# Patient Record
Sex: MALE | Race: WHITE | NOT HISPANIC OR LATINO | Employment: OTHER | URBAN - METROPOLITAN AREA
[De-identification: names, ages, dates, MRNs, and addresses within clinical notes are randomized per-mention and may not be internally consistent; named-entity substitution may affect disease eponyms.]

---

## 2017-03-07 ENCOUNTER — TELEPHONE (OUTPATIENT)
Dept: FAMILY MEDICINE | Facility: CLINIC | Age: 57
End: 2017-03-07

## 2017-03-07 NOTE — TELEPHONE ENCOUNTER
Panel Management Review      Patient has the following on his problem list:     Diabetes    ASA: Not Required     Last A1C  Lab Results   Component Value Date    A1C 8.1 06/09/2016    A1C 8.1 12/10/2015    A1C 8.2 05/28/2015    A1C 8.0 01/24/2015    A1C 8.1 02/19/2014     A1C tested: FAILED    Last LDL:    Lab Results   Component Value Date    CHOL 149 12/09/2016     Lab Results   Component Value Date    HDL 97 12/09/2016     Lab Results   Component Value Date    LDL 44 12/09/2016     Lab Results   Component Value Date    TRIG 39 12/09/2016     Lab Results   Component Value Date    CHOLHDLRATIO 1.6 01/24/2015     Lab Results   Component Value Date    NHDL 52 12/09/2016       Is the patient on a Statin? YES             Is the patient on Aspirin? YES    Medications     HMG CoA Reductase Inhibitors    SIMVASTATIN PO    Salicylates    aspirin 81 MG tablet          Last three blood pressure readings:  BP Readings from Last 3 Encounters:   12/22/16 118/80   10/17/16 130/72   04/08/15 136/74       Date of last diabetes office visit: unknown     Tobacco History:     History   Smoking Status     Former Smoker   Smokeless Tobacco     Former User     Comment: quite 20 years ago           Composite cancer screening  Chart review shows that this patient is due/due soon for the following None  Summary:    Patient is due/failing the following:   A1C    Action needed:   Patient needs office visit for Diabetes recheck.    Type of outreach:    Phone, left message for patient to call back.     Questions for provider review:    None                                                                                                                                    Li Whitley CMA      Chart routed to Care Team .

## 2017-04-19 NOTE — TELEPHONE ENCOUNTER
Called patient and left a message to give the clinic a call back.  When patient calls back please relay the below message.  Thank you.       Li Whitley CMA

## 2017-06-08 DIAGNOSIS — E10.42 TYPE 1 DIABETES MELLITUS WITH DIABETIC POLYNEUROPATHY (H): Primary | ICD-10-CM

## 2017-06-08 LAB — HBA1C MFR BLD: 7.8 % (ref 4.3–6)

## 2017-06-08 PROCEDURE — 36415 COLL VENOUS BLD VENIPUNCTURE: CPT | Performed by: INTERNAL MEDICINE

## 2017-06-08 PROCEDURE — 83036 HEMOGLOBIN GLYCOSYLATED A1C: CPT | Mod: QW | Performed by: INTERNAL MEDICINE

## 2017-09-21 ENCOUNTER — OFFICE VISIT (OUTPATIENT)
Dept: FAMILY MEDICINE | Facility: OTHER | Age: 57
End: 2017-09-21
Payer: COMMERCIAL

## 2017-09-21 VITALS
BODY MASS INDEX: 27.1 KG/M2 | SYSTOLIC BLOOD PRESSURE: 114 MMHG | OXYGEN SATURATION: 99 % | DIASTOLIC BLOOD PRESSURE: 80 MMHG | RESPIRATION RATE: 18 BRPM | HEIGHT: 71 IN | WEIGHT: 193.6 LBS | HEART RATE: 67 BPM | TEMPERATURE: 97.7 F

## 2017-09-21 DIAGNOSIS — M62.838 SPASM OF MUSCLE: ICD-10-CM

## 2017-09-21 DIAGNOSIS — E10.42 TYPE 1 DIABETES MELLITUS WITH DIABETIC POLYNEUROPATHY (H): ICD-10-CM

## 2017-09-21 DIAGNOSIS — M25.461 EFFUSION OF RIGHT KNEE: ICD-10-CM

## 2017-09-21 DIAGNOSIS — R19.7 DIARRHEA OF PRESUMED INFECTIOUS ORIGIN: Primary | ICD-10-CM

## 2017-09-21 PROCEDURE — 99214 OFFICE O/P EST MOD 30 MIN: CPT | Performed by: FAMILY MEDICINE

## 2017-09-21 PROCEDURE — 87506 IADNA-DNA/RNA PROBE TQ 6-11: CPT | Performed by: FAMILY MEDICINE

## 2017-09-21 ASSESSMENT — PAIN SCALES - GENERAL: PAINLEVEL: NO PAIN (1)

## 2017-09-21 NOTE — PROGRESS NOTES
SUBJECTIVE:   Enmanuel Mckeon is a 57 year old male who presents to clinic today for the following health issues:        Diarrhea  Onset: 2 weeks    Description:   Consistency of stool: watery  Blood in stool: no   Number of loose stools in past 24 hours: 6    Progression of Symptoms:  same    Accompanying Signs & Symptoms:  Fever: no   Nausea or vomiting; no   Abdominal pain: no   Episodes of constipation: no   Weight loss: YES    History:   Ill contacts: no   Recent use of antibiotics: YES   Recent travels: no          Recent medication-new or changes(Rx or OTC): no     Precipitating factors:   Nothing    Alleviating factors:   nothing    Therapies Tried and outcome:  PeptoBismol; Outcome: not helping    Knee Pain    Onset: week    Description:   Location: right knee  Character: Dull ache    Intensity: mild    Progression of Symptoms: same    Accompanying Signs & Symptoms:  Other symptoms: numbness    History:   Previous similar pain: no       Precipitating factors:   Trauma or overuse: no     Alleviating factors:  Improved by: nothing    Therapies Tried and outcome: Nothing tried.       Neck and Shoulder Pain  Onset: Week    Description:   Location: Right side  Radiation: into the right neck and into the right shoulder    Intensity: mild    Progression of Symptoms:  same    Accompanying Signs & Symptoms:  Burning, prickly sensation (paresthesias) in arm(s): no   Numbness in arm(s): no   Weakness in arm(s):  no   Fever: no   Headache: no   Nausea and/or vomiting: no     History:   Trauma: no   Previous neck pain: YES  Previous surgery or injections: Yes, shoulder injection   Previous Imaging (MRI,X ray): no     Precipitating factors:   Does movement increase the pain:  no     Alleviating factors:  nothing    Therapies Tried and outcome:  IBU little relief but not much with neck spasms.           Diabetes Follow-up    Patient is checking blood sugars: four times daily.    Blood sugar testing frequency  justification: Frequent hypoglycemia  Results are as follows:  He is followed by Dr. De Paz, Endocrinology.        Diabetic concerns: blood sugar frequently over 200     Symptoms of hypoglycemia (low blood sugar): shaky     Paresthesias (numbness or burning in feet) or sores: No   Date of last diabetic eye exam: <1 year        Problem list and histories reviewed & adjusted, as indicated.    He was seen by endocrinology for poison ivy on his arms 2 months ago. Dr. De Paz thought he may have a secondary cellulitis and put him on consecutive courses of what sounds like Keflex 4 times daily. Both courses were for nearly 3 weeks total. Onset of loose stool following the second course of antibiotic. He has no known contaminated water exposure, He has well water and thinks his wife may also be having some loose stools. No recent travel outside the [place].    Additional history: Patient Active Problem List   Diagnosis     TYPE 1 DIABETES, HBA1C GOAL < 7%     CARDIOVASCULAR SCREENING; LDL GOAL LESS THAN 100     Hypertension goal BP (blood pressure) < 140/90     Past Surgical History:   Procedure Laterality Date     HC COLONOSCOPY W/WO BRUSH/WASH  01/27/09       Social History   Substance Use Topics     Smoking status: Former Smoker     Smokeless tobacco: Former User      Comment: quite 20 years ago     Alcohol use 0.0 oz/week     0 Standard drinks or equivalent per week      Comment: occasional     Family History   Problem Relation Age of Onset     Alcohol/Drug Sister      DIABETES Father      HEART DISEASE Father      4 way bypass     Neurologic Disorder Mother      anurism         Current Outpatient Prescriptions   Medication Sig Dispense Refill     SIMVASTATIN PO        Insulin Aspart (NOVOLOG SC)        aspirin 81 MG tablet Take 2 tablets (162 mg) by mouth daily 100 tablet 3     glucose blood VI test strips (ACCU-CHEK COMFORT CURVE) strip 5 times daily 100 each 0     multivitamin, therapeutic with minerals (MULTI-VITAMIN)  "TABS Take 1 tablet by mouth daily 100 tablet 3     LISINOPRIL 5 MG OR TABS 1 1/2 TABLET DAILY 30 0     INSULIN SYRINGE-NEEDLE U-100 MISC 0.25 ML 29 X 1/2\"  insulin pump       fluticasone (FLONASE) 50 MCG/ACT nasal spray Spray 1-2 sprays into both nostrils daily (Patient not taking: Reported on 9/21/2017) 16 g 11     Allergies   Allergen Reactions     No Known Drug Allergies      Recent Labs   Lab Test  06/08/17   1606  12/09/16   0859  06/09/16   0905  12/10/15   0751   06/05/12   0821   07/01/11   0752   A1C  7.8*   --   8.1*  8.1*   < >  7.6*   < >  8.0*   LDL   --   44  77  95   < >  106   --   101   HDL   --   97  102  96   < >  86   --   85   TRIG   --   39  52  47   < >  65   --   102   ALT   --    --    --    --    --   31   --   25   CR   --   0.76   --   0.81   < >  0.75   --   0.87   GFRESTIMATED   --   >90  Non  GFR Calc     --   >90  Non  GFR Calc     < >  >90   --   >90   GFRESTBLACK   --   >90   GFR Calc     --   >90   GFR Calc     < >  >90   --   >90   POTASSIUM   --   4.4   --   4.4   < >  4.9   --   4.2   TSH   --    --    --    --    --   1.84   --   1.66    < > = values in this interval not displayed.      BP Readings from Last 3 Encounters:   09/21/17 114/80   12/22/16 118/80   10/17/16 130/72    Wt Readings from Last 3 Encounters:   09/21/17 193 lb 9.6 oz (87.8 kg)   12/22/16 196 lb (88.9 kg)   10/17/16 195 lb 12.8 oz (88.8 kg)                  Labs reviewed in EPIC          Reviewed and updated as needed this visit by clinical staffTobacco  Allergies  Meds  Problems  Med Hx  Surg Hx  Fam Hx  Soc Hx        Reviewed and updated as needed this visit by Provider  Allergies  Meds  Problems         ROS:  C: NEGATIVE for fever, chills, change in weight  E/M: NEGATIVE for ear, mouth and throat problems  R: NEGATIVE for significant cough or SOB  CV: NEGATIVE for chest pain, palpitations or peripheral edema  GI: POSITIVE for " "diarrhea 6-7 watery non bloody stools/day and NEGATIVE for abdominal pain/cramping, constipation, gas or bloating, hematochezia, hemorrhoids, Hx colon polyps, Hx IBD, Hx IBS, jaundice, melena, nausea, poor appetite and vomiting  MUSCULOSKELETAL: POSITIVE  for joint swelling right knee, without known injury or pain and muscle spasm neck muscles and charley horse in legs over the last 2-3 weeks.  ENDOCRINE: POSITIVE  for HX diabetes followed by Endocrinology, Dr. Gonzales.  ROS otherwise negative    OBJECTIVE:     /80 (BP Location: Right arm, Patient Position: Chair, Cuff Size: Adult Large)  Pulse 67  Temp 97.7  F (36.5  C) (Temporal)  Resp 18  Ht 5' 11\" (1.803 m)  Wt 193 lb 9.6 oz (87.8 kg)  SpO2 99%  BMI 27 kg/m2  Body mass index is 27 kg/(m^2).  GENERAL: healthy, alert and no distress  NECK: no adenopathy, no asymmetry, masses, or scars and thyroid normal to palpation  RESP: lungs clear to auscultation - no rales, rhonchi or wheezes  CV: regular rate and rhythm, normal S1 S2, no S3 or S4, no murmur, click or rub, no peripheral edema and peripheral pulses strong  ABDOMEN: soft, nontender, no hepatosplenomegaly, no masses and bowel sounds normal  MS: RLE exam shows normal strength and muscle mass, no deformities, no erythema, induration, or nodules, ROM of all joints is normal, no evidence of joint instability and tr-1+ effusion of right knee.    Diagnostic Test Results:  none     ASSESSMENT/PLAN:     1. Diarrhea of presumed infectious origin  Acute, suspect due to prolonged course of antibiotics for cellulitis. Possible GI side effect vs C. Diff. Vs  Pathogenic bacteria from well water. Abdomen is benign. Will obtain stool for c.diff, enteric bacteria and virus as well as O&P. Encourage fluids and consider starting OTC Probiotic and/or Imodium for symptom relief until stool cultures return. Enteric precautions urged.  - Enteric Bacteria and Virus Panel by [facility] Stool; Future  - Clostridium difficile toxin " B PCR; Future  - Ova and Parasite Exam Routine; Future    2. Effusion of right knee  Acute, most likely over use/ traumatic from jumping in and out of his truck. Effusion only without sign of infection or synovitis. Will wear ACE or knee sleeve, start scheduled OTC NSAID, renal function is normal, ice and follow up if not improving. May need diagnostic arthrocentesis and steroid injection.    3. Spasm of muscle  Acute, suspect due to mild dehydration associated with prolonged diarrhea. Encourage plenty of fluids (up to 8 glasses of water daily) is suggested to relieve these symptoms.     4. Type 1 diabetes mellitus with diabetic polyneuropathy (H)  Chronic currently followed by Dr. De Paz. He will be making routine follow up appointment. A1C is adequate.      FUTURE APPOINTMENTS:       - Follow-up visit in 2-3 weeks if knee not improving.       - Make appointment with Dr. De Paz specialist    Pino Calles MD  [facility]

## 2017-09-21 NOTE — MR AVS SNAPSHOT
"              After Visit Summary   9/21/2017    Enmanuel Mckeon    MRN: [id number]           Patient Information     Date Of Birth          1960        Visit Information        Provider Department      9/21/2017 11:00 AM Pino Calles MD [facility]        Today's Diagnoses     Diarrhea of presumed infectious origin    -  1    Effusion of right knee        Spasm of muscle        Type 1 diabetes mellitus with diabetic polyneuropathy (H)           Follow-ups after your visit        Future tests that were ordered for you today     Open Future Orders        Priority Expected Expires Ordered    Enteric Bacteria and Virus Panel by [facility] Stool Routine  9/21/2018 9/21/2017    Clostridium difficile toxin B PCR Routine  9/29/2017 9/21/2017    Ova and Parasite Exam Routine Routine  9/21/2018 9/21/2017            Who to contact     If you have questions or need follow up information about today's clinic visit or your schedule please contact [facility] directly at [phone].  Normal or non-critical lab and imaging results will be communicated to you by MyChart, letter or phone within 4 business days after the clinic has received the results. If you do not hear from us within 7 days, please contact the clinic through DXY[facility] or phone. If you have a critical or abnormal lab result, we will notify you by phone as soon as possible.  Submit refill requests through [employer] [facility] [employer] or call your pharmacy and they will forward the refill request to us. Please allow 3 business days for your refill to be completed.          Additional Information About Your Visit        DXYhart Information     [employer] [facility] lets you send messages to your doctor, view your test results, renew your prescriptions, schedule appointments and more. To sign up, go to [email or web address] . Click on \"Log in\" on the left side of the screen, which will take you to the Welcome page. Then click on \"Sign up Now\" on the right side of the page. " "    You will be asked to enter the access code listed below, as well as some personal information. Please follow the directions to create your username and password.     Your access code is: [email or web address]  Expires: 2017  2:03 PM     Your access code will  in 90 days. If you need help or a new code, please call your [place] clinic or [phone].        Care EveryWhere ID     This is your Care EveryWhere ID. This could be used by other organizations to access your [facility] medical records  [id number]        Your Vitals Were     Pulse Temperature Respirations Height Pulse Oximetry BMI (Body Mass Index)    67 97.7  F (36.5  C) (Temporal) 18 5' 11\" (1.803 m) 99% 27 kg/m2       Blood Pressure from Last 3 Encounters:   17 114/80   16 118/80   10/17/16 130/72    Weight from Last 3 Encounters:   17 193 lb 9.6 oz (87.8 kg)   16 196 lb (88.9 kg)   10/17/16 195 lb 12.8 oz (88.8 kg)               Primary Care Provider    None Specified       No primary provider on file.        Equal Access to Services     DEVI GALINDO AH: Hadii lea drummond Sojuan luis, waaxda luqadaha, qaybta kaalmada adeegyada, jethro zhou. So [facility] [phone].    ATENCIÓN: Si habla español, tiene a guidry disposición servicios gratuitos de asistencia lingüística. Llame al [phone].    We comply with applicable federal civil rights laws and [place] laws. We do not discriminate on the basis of race, color, national origin, age, disability sex, sexual orientation or gender identity.            Thank you!     Thank you for choosing [facility]  for your care. Our goal is always to provide you with excellent care. Hearing back from our patients is one way we can continue to improve our services. Please take a few minutes to complete the written survey that you may receive in the mail after your visit with us. Thank you!             Your Updated Medication List - Protect others around " "you: Learn how to safely use, store and throw away your medicines at www.disposemymeds.org.          This list is accurate as of: 9/21/17  2:03 PM.  Always use your most recent med list.                   Brand Name Dispense Instructions for use Diagnosis    ACCU-CHEK COMFORT CURVE test strip   Generic drug:  blood glucose monitoring     100 each    5 times daily    Type I (juvenile type) diabetes mellitus without mention of complication, not stated as uncontrolled       aspirin 81 MG tablet     100 tablet    Take 2 tablets (162 mg) by mouth daily        fluticasone 50 MCG/ACT spray    FLONASE    16 g    Spray 1-2 sprays into both nostrils daily    Acute recurrent maxillary sinusitis       INSULIN SYRINGE-NEEDLE U-100 29G X 1/2\" 0.25 ML Misc      insulin pump        lisinopril 5 MG tablet    PRINIVIL/ZESTRIL    30    1 1/2 TABLET DAILY        Multi-vitamin Tabs tablet     100 tablet    Take 1 tablet by mouth daily        NOVOLOG SC           SIMVASTATIN PO             "

## 2017-09-21 NOTE — NURSING NOTE
"Chief Complaint   Patient presents with     Diarrhea     Shoulder Pain     Knee Pain     Neck Pain       Initial /80 (BP Location: Right arm, Patient Position: Chair, Cuff Size: Adult Large)  Pulse 67  Temp 97.7  F (36.5  C) (Temporal)  Resp 18  Ht 5' 11\" (1.803 m)  Wt 193 lb 9.6 oz (87.8 kg)  SpO2 99%  BMI 27 kg/m2 Estimated body mass index is 27 kg/(m^2) as calculated from the following:    Height as of this encounter: 5' 11\" (1.803 m).    Weight as of this encounter: 193 lb 9.6 oz (87.8 kg).  Medication Reconciliation: complete     Wanda Brand MA 9/21/2017  11:08 AM          "

## 2017-09-22 ENCOUNTER — TELEPHONE (OUTPATIENT)
Dept: FAMILY MEDICINE | Facility: OTHER | Age: 57
End: 2017-09-22

## 2017-09-22 DIAGNOSIS — R19.7 DIARRHEA OF PRESUMED INFECTIOUS ORIGIN: ICD-10-CM

## 2017-09-22 DIAGNOSIS — A04.72 C. DIFFICILE COLITIS: Primary | ICD-10-CM

## 2017-09-22 DIAGNOSIS — M25.461 EFFUSION OF RIGHT KNEE: Primary | ICD-10-CM

## 2017-09-22 LAB
C COLI+JEJUNI+LARI FUSA STL QL NAA+PROBE: NOT DETECTED
C DIFF TOX B STL QL: POSITIVE
EC STX1 GENE STL QL NAA+PROBE: NOT DETECTED
EC STX2 GENE STL QL NAA+PROBE: NOT DETECTED
ENTERIC PATHOGEN COMMENT: NORMAL
NOROV GI+II ORF1-ORF2 JNC STL QL NAA+PR: NOT DETECTED
RVA NSP5 STL QL NAA+PROBE: NOT DETECTED
SALMONELLA SP RPOD STL QL NAA+PROBE: NOT DETECTED
SHIGELLA SP+EIEC IPAH STL QL NAA+PROBE: NOT DETECTED
SPECIMEN SOURCE: ABNORMAL
V CHOL+PARA RFBL+TRKH+TNAA STL QL NAA+PR: NOT DETECTED
Y ENTERO RECN STL QL NAA+PROBE: NOT DETECTED

## 2017-09-22 PROCEDURE — 87177 OVA AND PARASITES SMEARS: CPT | Performed by: FAMILY MEDICINE

## 2017-09-22 PROCEDURE — 87493 C DIFF AMPLIFIED PROBE: CPT | Performed by: FAMILY MEDICINE

## 2017-09-22 PROCEDURE — 87209 SMEAR COMPLEX STAIN: CPT | Performed by: FAMILY MEDICINE

## 2017-09-22 NOTE — TELEPHONE ENCOUNTER
Enmanuel Mckeon is a 57 year old male who calls with an injury of his right knee.  Pt states he saw Dr. Calles yesterday, and was told to use ice, NSAIDs and elevated his right knee.  Pt states that he has also been using a knee brace.  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  States his knee is a little more swollen than yesterday, but he is able to move and bend knee.  Rates pain at about 5-6/10 and it gets worse with movement.  States he can bear weight.  Pt states that his daughter is getting  in 2 weeks, so he wants to get something going so he can walk better for wedding. Pt is wondering what he can do.    PRESENTING PROBLEM:  Right knee injury    NURSING ASSESSMENT:  Description/location:  Right knee injury  Onset/duration:  1 week  Precip. factors:  NA  Associated symtoms:  See above  Bleeding: no   Deformity/dislocation/discoloration:  no  Color Motion Sensation:  Reports sensation, color, temperature are all normal  Neurological: N/A   Pain scale (0-10)   5-6/10  Improves/worsens symptoms:  Movement worsens  Symptom specific -Treatments:  Ibuprofen, ice, brace help  Allergies:   Allergies   Allergen Reactions     No Known Drug Allergies      Last related exam/Treatment:  9/21/17    NURSING PLAN: Nursing advice to patient see below    RECOMMENDED DISPOSITION:  Relayed Dr. Cabello's message, and pt would like to schedule with orthopedics.  Advised pt to continue home treatment, and offered to get pt scheduled with ortho.   Advised pt if pain worsens or begins to have red streaks/redness/signs of infection to go to ER over the weekend.  Transferred to specialty and pt scheduled at 8 am on Monday with Dr. Pride.  Orthopedic referral pended for provider, routing back to Dr. Calles for signing.   Will comply with recommendation: Yes  If further questions/concerns or if symptoms do not improve, worsen or new symptoms develop, call your PCP or [facility] as soon as  possible.    Guideline used: Knee Pain/Swelling/Injury  Telephone Triage Protocols for Nurses, Fifth Edition, Cici Choi RN

## 2017-09-22 NOTE — TELEPHONE ENCOUNTER
Please call patient to triage symptoms.  I am unable to see today. He can be referred to Ortho.  Electronically signed by Pino Calles MD

## 2017-09-22 NOTE — TELEPHONE ENCOUNTER
Reason for Call:  Other question     Detailed comments: patient calling, states that he saw Dr. Calles yesterday and wanted him to know that his knee is getting worse, patient states that Dr. Cabello told him has a joint doctor that could look at it, patient would like to get this information, please call and advise     Phone Number Patient can be reached at: Cell number on file:    Telephone Information:   Mobile [phone]       Best Time: any     Can we leave a detailed message on this number? YES    Call taken on 9/22/2017 at 8:41 AM by Melina Ryan

## 2017-09-24 PROBLEM — A04.72 C. DIFFICILE COLITIS: Status: ACTIVE | Noted: 2017-09-24

## 2017-09-24 RX ORDER — METRONIDAZOLE 500 MG/1
500 TABLET ORAL 3 TIMES DAILY
Qty: 30 TABLET | Refills: 0 | Status: SHIPPED | OUTPATIENT
Start: 2017-09-24 | End: 2017-10-04

## 2017-09-25 ENCOUNTER — OFFICE VISIT (OUTPATIENT)
Dept: ORTHOPEDICS | Facility: CLINIC | Age: 57
End: 2017-09-25
Payer: COMMERCIAL

## 2017-09-25 ENCOUNTER — RADIANT APPOINTMENT (OUTPATIENT)
Dept: GENERAL RADIOLOGY | Facility: CLINIC | Age: 57
End: 2017-09-25
Attending: ORTHOPAEDIC SURGERY
Payer: COMMERCIAL

## 2017-09-25 VITALS — TEMPERATURE: 97.9 F | HEIGHT: 71 IN | WEIGHT: 193 LBS | BODY MASS INDEX: 27.02 KG/M2

## 2017-09-25 DIAGNOSIS — M11.261 CHONDROCALCINOSIS OF KNEE, RIGHT: ICD-10-CM

## 2017-09-25 DIAGNOSIS — M25.561 RIGHT KNEE PAIN: ICD-10-CM

## 2017-09-25 DIAGNOSIS — S89.91XA KNEE INJURY, RIGHT, INITIAL ENCOUNTER: ICD-10-CM

## 2017-09-25 DIAGNOSIS — M25.461 EFFUSION OF RIGHT KNEE: Primary | ICD-10-CM

## 2017-09-25 LAB
O+P STL MICRO: NORMAL
O+P STL MICRO: NORMAL
SPECIMEN SOURCE: NORMAL

## 2017-09-25 PROCEDURE — 99204 OFFICE O/P NEW MOD 45 MIN: CPT | Mod: 25 | Performed by: ORTHOPAEDIC SURGERY

## 2017-09-25 PROCEDURE — 73562 X-RAY EXAM OF KNEE 3: CPT | Mod: TC

## 2017-09-25 PROCEDURE — 20610 DRAIN/INJ JOINT/BURSA W/O US: CPT | Mod: RT | Performed by: ORTHOPAEDIC SURGERY

## 2017-09-25 RX ORDER — MELOXICAM 15 MG/1
15 TABLET ORAL DAILY
Qty: 30 TABLET | Refills: 1 | Status: SHIPPED | OUTPATIENT
Start: 2017-09-25 | End: 2021-02-08

## 2017-09-25 NOTE — PATIENT INSTRUCTIONS
Encounter Diagnoses   Name Primary?     Effusion of right knee Yes     Chondrocalcinosis of knee, right      Knee injury, right, initial encounter      Rest, ice and elevate above heart level as needed for pain control  1. We can see you have a lot of swelling on the knee.  We do not feel like you have torn your ACL, you would not have been finish work and the knee would have swelled up right away.  2. We decided on removing the swelling today and doing the cortisone injection.  3. It is possible if the knee continues to give you trouble that you may have a meniscus injury inside the knee but we would only see that with an MRI.  4.We discussed a MRI study that may give us more information but we decided to hold off on that today.  We don't feel you need an MRI at this point.  5. We discussed about getting a new knee brace but you would like to stay with the one you have which is a soft knee brace.  6. Rest ice and elevation is best.  7. I ordered Mobic 15mg once a day times 2 weeks, then take as needed.  Stop this medication if you have any abdominal pain with it.  I sent this to your pharmacy.  You can stop the ibuprofen when you're taking the Mobic.  8. Your x-rays look good other than showing us that you have some swelling and there's some calcification in your joints but this is not a major finding.  9. Listen to your body and let the pain guide your activity, as in slow down if you are having a lot of pain, but increase activity gradually as the pain decreases.   10. We put exercises below, start these gradually. Focus in the beginning on stretching as in doing extension and flexion of the knee gradually.  11. 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   12. You can followup with Haley Pride MD in 6 weeks, if you are having pain at that time we can do a cortisone injection.  You can always cancel the appointment if you are doing really well and follow-up as needed.       Cortisone Instructions:      1. You received an injection of cortisone into your right knee today after we took off fluid.  2. The joint(s) may be more painful for the first 1-2 days.  3. We ask you to continue to rest the joint(s) for a few more days before resuming regular activities.  4. Pain Medications you can take (as long as your primary care provider allows these meds and you do not have kidney or liver conditions):  Tylenol  Take 1000 mg by mouth every 6 hours as needed; maximum dose 4000 mg a day  Ibuprofen  600 mg every 6 hours as needed; maximum 2400 mg a day  (OK to take tylenol and ibuprofen at the same time)  5. Rest, ice and elevate as needed for pain control  6. Watch for these signs of infection: redness, swelling, drainage, warmth to touch, increased pain, or fever. Call the clinic or make an appointment to be seen if you think you have an infection.  7. If you are diabetic, make sure you keep a close eye on your blood sugars, they can get elevated with cortisone injections.   8. Sometimes it can take 1-2 weeks for it to reach its full effect.    Cortisone Injections  Cortisone is a type of steroid. It can greatly reduce swelling, redness, and irritation (inflammation) and pain. Being injected with cortisone is simple and doesn t take long. Your doctor may ask you questions about your health. Certain health conditions, such as diabetes, can be affected by cortisone.     Your pain may be relieved by a cortisone injection.   Why have a cortisone injection?  Injecting cortisone can relieve pain for anything from a sports injury to arthritis. Your doctor may suggest an injection if rest, splints, or oral medicine doesn t relieve your pain. Injecting cortisone is simpler than having surgery. And cortisone may provide the lasting pain relief that can help you get out and enjoy life again.  Getting the injection  Your doctor will start by cleaning and occasionally numbing your skin at the injection site. Next you ll be injected  with local anesthetics (for short-term pain relief) and cortisone. The injection may last a few moments. A small bandage will be put over the injection site. You ll then be ready to go home.  After your injection  After being injected, make sure you don t injure the treated region. But stay active. Enjoy a walk or some other mild activity. Just be careful not to strain the region that gave you trouble.  The next day  Some people feel more pain after being injected. This is normal, and it will go away soon. Applying ice for 20 minutes at a time to your injury may reduce the increased pain. Rest for the first day or two. You don t need to stay in bed. But avoid tasks that may strain the injured region.  If you have diabetes  Cortisone injections can cause blood sugar to be increased for several days after the injection. If you have diabetes, you should follow your blood  sugar closely during this time. Follow your regular plan for what to do when your blood sugar is elevated.     8464-8070 [employer]. [address] [place] [address]. All rights reserved. This information is not intended as a substitute for professional medical care. Always follow your healthcare professional's instructions.    Wall Squats for ACL Healing    After you regain muscle control, it s time to build strength. This helps you put full weight on your leg. For best results, warm up and stretch before starting. If your injury is recent, wait until swelling and pain decrease before doing this exercise:    Lean against a wall with your feet hip-width apart. Your feet should be about 18 inches from the wall.    Slowly slide down to a near-sitting position. Don t let your knees go past 90 degrees.    Hold for 10 seconds, then slide back up.    Repeat 5 times.     CAUTION: Do this exercise only if your healthcare provider says it s OK.     4982-4117 [employer]. [address] [place] [id number]. All  rights reserved. This information is not intended as a substitute for professional medical care. Always follow your healthcare professional's instructions.        Leg and Knee Exercises: Leg Raise    This exercise is designed to stretch and strengthen your knee. Before beginning, read through all the instructions. While exercising, breathe normally and use smooth movements. If you feel any pain, stop the exercise. If pain persists, call your healthcare provider.  Caution    Don t arch your back.    Don t hunch your shoulders.     Sit on the floor with your_________ leg straight, the other bent.    Tighten the thigh muscles on the top of your straight leg. You should feel the muscles contract. Raise that leg 6-8 inches. Then lower it slowly and steadily to the floor. Relax.    Repeat ______ times.  Do ______ sets a day.    [id number] [employer]. [address] [place] [address]. All rights reserved. This information is not intended as a substitute for professional medical care. Always follow your healthcare professional's instructions.        Quad Set for Leg and Knee    This exercise is designed to stretch and strengthen your knee. Before beginning, read through all the instructions. While exercising, breathe normally and use smooth movements. If you feel any pain, stop the exercise. If pain persists, call your healthcare provider.  1.  Sit on the floor with one leg straight, the other bent.  2.  Flex the foot of your straight leg by pointing your toes toward you. Press the back of your knee into the floor while tightening the muscle on the top of your thigh. Hold for ______ seconds. Then relax.  3.  Repeat ______ times. Do ______ sets a day.  Caution    Don t arch your back.    Don t hunch your shoulders.    8838-0270 [employer]. [address] [place] [address]. All rights reserved. This information is not intended as a substitute for professional medical care. Always  follow your healthcare professional's instructions.         [employer] and [employer] may offer reliable information regarding your diagnosis and treatment plan.    THANK YOU for coming in today. If you receive a survey via [employer] or mail please let us know if there was anything you especially appreciated today or if there is any way we can improve our clinic. We appreciate your input.    GENERAL INFORMATION:  Our hours are:  Monday :     Clinic 7:30 AM-430 PM ([facility])  Tuesday:      Operating Room All Day ([facility])  Wednesday: Clinic 7:30 AM - 11:15 AM ([facility])             Clinic 1:00 PM - 4:00PM ([facility])  Thursday:     Administrative Day  Friday:          Clinic 7:30 AM - 11:15 AM ([facility])            Clinic 1:00 PM - 4:00 PM ([facility])      Bone and Joint Service Line for any issues or concerns: [phone]      We are not in the office Thursdays. Therefore non- urgent calls and medical messages received on Thursday will be addressed when we are back in the office on Wednesday. Urgent matters will be reviewed and addressed by one of our partners in the office as needed.    If lab work was done today as part of your evaluation you will generally be contacted via [employer], mail, or phone with the results within 1-5 days. If there is an alarming result we will contact you by phone. Lab results come back at varying times, I generally wait until all labs are resulted before making comments on results. Please note labs are automatically released to [employer] (if you have signed up for it) once available-at times you may see these prior to my having a chance to review them as well.    If you need refills please contact your pharmacist. They will send a refill request to me to review. Please allow 3 business days for us to process all refill requests. All narcotic refills  should be handled in the clinic at the time of your visit.

## 2017-09-25 NOTE — PROGRESS NOTES
Enmanuel Mckeon is a 57 year old male who is seen in consultation at the request of Dr. Calles  History of Present illness:  Enmanuel presents for evaluation of:  1.) rt knee pain  2.)   Onset: 9/20/17  Symptoms brought on by fell off a drillers platform about 3 feet.   Progression of symptoms:  same.    Previous similar pain: no .   Pain Level:  3/10.   Previous treatments:  ice and Ibuprofen.  Currently on Blood thinners? no  Diagnosis of Diabetes? Yes

## 2017-09-25 NOTE — PROGRESS NOTES
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 He enjoys fishing golfing and hunting. He has a son and 2 daughters. One of his daughters is getting  in 2 weeks.     He is being seen for   Chief Complaints and History of Present Illnesses   Patient presents with     Consult     rt knee pain and swelling per Dr. Clales         History of Present Illness:   Enmanuel Mckeon is a 57 year old male who is seen in consultation at the request of Dr. Calles  History of Present illness:  Enmanuel presents for evaluation of:  1.) rt knee pain  Onset: 9/20/17  Symptoms brought on by fell off a [email or web address] platform about 3 feet. When he fell he landed and twisted his knee he thought. He did not hear pop.  Character:  dull ache now. .    Progression of symptoms:  same.    Previous similar pain: no, denies any previous surgery or previous injury to the right knee  Pain Level:  3/10.   Previous treatments:  ice and Ibuprofen. Patient has been icing and elevating which has not helped all that much although the ibuprofen he takes 400 mg about every 6 hours has been helping. He also put on a soft breeze but is unsure if that's helped. He has not had any formal therapy or exercises or any injections.  Currently on Blood thinners? no  Diagnosis of Diabetes? Yes   Additional History: patient denies any catching or locking of the knee or any numbness or tingling. When the injury happened he was able to finish the rest of the day and it did not swell up right away until the next day. Patient denies any swelling like a grapefruit. Patient denies any bruising or hearing a pop. Patient has been using a crotch but he can ambulate on it without problems. Patient states that the pain is on the back of his knee but also under the kneecap and sometimes feels some pain laterally.    Patient's past medical, surgical, social and family histories  "reviewed.     Past Medical History:   Diagnosis Date     Diabetic eye exam (H) 08/12/11     Diabetic eye exam (H) 08/24/12, 2/14/14     Diabetic eye exam (H) 1/23/15     Essential hypertension, benign      Type I (juvenile type) diabetes mellitus without mention of complication, not stated as uncontrolled          Past Surgical History:   Procedure Laterality Date     HC COLONOSCOPY W/WO BRUSH/WASH  01/27/09       Medications:    Current Outpatient Prescriptions on File Prior to Visit:  metroNIDAZOLE (FLAGYL) 500 MG tablet Take 1 tablet (500 mg) by mouth 3 times daily for 10 days   SIMVASTATIN PO    Insulin Aspart (NOVOLOG SC)    fluticasone (FLONASE) 50 MCG/ACT nasal spray Spray 1-2 sprays into both nostrils daily (Patient not taking: Reported on 9/21/2017)   aspirin 81 MG tablet Take 2 tablets (162 mg) by mouth daily   glucose blood VI test strips (ACCU-CHEK COMFORT CURVE) strip 5 times daily   multivitamin, therapeutic with minerals (MULTI-VITAMIN) TABS Take 1 tablet by mouth daily   LISINOPRIL 5 MG OR TABS 1 1/2 TABLET DAILY   INSULIN SYRINGE-NEEDLE U-100 MISC 0.25 ML 29 X 1/2\" insulin pump     No current facility-administered medications on file prior to visit.     Allergies   Allergen Reactions     No Known Drug Allergies        Social History     Occupational History     Not on file.     Social History Main Topics     Smoking status: Former Smoker     Smokeless tobacco: Former User      Comment: quite 20 years ago     Alcohol use 0.0 oz/week     0 Standard drinks or equivalent per week      Comment: occasional     Drug use: No     Sexual activity: Yes     Partners: Female       Family History   Problem Relation Age of Onset     Alcohol/Drug Sister      DIABETES Father      HEART DISEASE Father      4 way bypass     Neurologic Disorder Mother      anurism       REVIEW OF SYSTEMS  10 point review systems performed otherwise negative as noted as per history of present illness.    Physical Exam:  Vitals: Temp " "97.9  F (36.6  C)  Ht 1.803 m (5' 11\")  Wt 87.5 kg (193 lb)  BMI 26.92 kg/m2  BMI= Body mass index is 26.92 kg/(m^2).    Constitutional: healthy, alert and no acute distress   Psychiatric: mentation appears normal and affect normal/bright  NEURO: no focal deficits, CMS intact right lower extremity  RESP: Normal with easy respirations and no use of accessory muscles to breathe, no audible wheezing or retractions  CV: Calf soft and nontender to palpation, leg warm   SKIN: No erythema, rashes, excoriation, or breakdown. No evidence of infection.   MUSCULOSKELETAL:    INSPECTION of right knee: we are able to visualize swelling of the right knee when compared to the left. No gross deformities, erythema, ecchymosis, atrophy or fasciculations.     PALPATION: patient had very generalized and minimal tenderness to palpation of the posterior knee. No tenderness on palpation of the medial, lateral, anterior portion of the knee. No specific joint line tenderness. No increased warmth.     ROM: Extension about 5  short of full extension, flexion to approximately 100 . All range of motion without catching, locking or but he does have pain secondary to the swelling at maximal flexion and extension. I am unable to note any crepitus and the patella track centrally.       STRENGTH: able to do a straight leg raise and fire his quad. Able to flex the knee against gravity    SPECIAL TEST: Patient has a negative Lachman's negative drawer sign however this did seem to have some laxity when compared to the contralateral side it was nearly exactly the same. Patient's knee is stable to varus and valgus stress at 30  of flexion. Patient has a positive Ihsan's.   GAIT: antalgic gait on the right. Patient brought in a crotch today to get around better.  Lymph: no palpable lymph nodes    Diagnostic Modalities:  Recent Results (from the past 744 hour(s))   XR Knee Right 3 Views    Narrative    RIGHT KNEE THREE VIEWS  9/25/2017 8:05 AM "     HISTORY: Pain in right knee.    COMPARISON: None.      Impression    IMPRESSION: Mild to moderate knee effusion. Patellofemoral  articulation intact. No definite acute fracture. Chondrocalcinosis in  both medial and lateral knee compartments.    BARBARA NG MD     Agree with the above reading  Independent visualization of the images was performed.    Impression: 1. Right knee effusion. 2. Right knee injury. 3. Right knee chondrocalcinosis medial and lateral    Plan:  All of the above pertinent physical exam and imaging modalities findings was reviewed with Enmanuel.                                          INJECTION PROCEDURE:  The patient was counseled about an aspiration and injection, including discussion of risks (including infection), contents of the procedure, rationale for performing the procedure, and expected benefits of the aspiration and injection. The patient was placed in the supine position. The skin was prepped with alcohol and betadine and then utilizing sterile technique an aspiration and injection of the right knee joint from the superior lateral approach. I used chris chloride spray prior to doing the aspiration. I injected 3 ml of 1% lidocaine then removed the needle.  After good anesthetic was achieved, I then used chris chloride again and put in an 18 gauge needed in the same location.  I moved the needle around to get all the fluid I could.  I aspirated 74 ml of keith joint fluid.  Then I removed the syringe but left the needle in the knee.  I attached the pre drawn up cortisone injection.  The injection consisted 1ml of Kenalog (40mg per 1ml) with 8ml 1% lidocaine plain. The patient tolerated the aspiration and injection well, and there were no complications. The injection site was cleaned and covered with a Band-Aid. The injection was performed by ARNOLD Moscoso      Patient Instructions:  1. We can see you have a lot of swelling on the knee.  We do not feel like you have torn your ACL,  you would not have been finish work and the knee would have swelled up right away.  2. We decided on removing the swelling today and doing the cortisone injection.  3. It is possible if the knee continues to give you trouble that you may have a meniscus injury inside the knee but we would only see that with an MRI.  4.We discussed a MRI study that may give us more information but we decided to hold off on that today.  We don't feel you need an MRI at this point.  5. We discussed about getting a new knee brace but you would like to stay with the one you have which is a soft knee brace.  6. Rest ice and elevation is best.  7. I ordered Mobic 15mg once a day times 2 weeks, then take as needed.  Stop this medication if you have any abdominal pain with it.  I sent this to your pharmacy.  You can stop the ibuprofen when you're taking the Mobic.  8. Your x-rays look good other than showing us that you have some swelling and there's some calcification in your joints but this is not a major finding.  9. Listen to your body and let the pain guide your activity, as in slow down if you are having a lot of pain, but increase activity gradually as the pain decreases.   10. We put exercises below, start these gradually. Focus in the beginning on stretching as in doing extension and flexion of the knee gradually.  11. 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   12. You can followup with Haley Pride MD in 6 weeks, if you are having pain at that time we can do a cortisone injection.  You can always cancel the appointment if you are doing really well and follow-up as needed.  Re-x-ray on return: No    Scribed by Hector Nguyen PA-C on 9/25/2017 at 9:08 AM, based on Dr. Haley Pride's statements to me.    This note was dictated with [employer].    PAIGE Millan MD

## 2017-09-25 NOTE — MR AVS SNAPSHOT
After Visit Summary   9/25/2017    Enmanuel Mckeon    MRN: [id number]           Patient Information     Date Of Birth          1960        Visit Information        Provider Department      9/25/2017 8:00 AM Haley Pride MD [facility]        Today's Diagnoses     Effusion of right knee    -  1    Chondrocalcinosis of knee, right        Knee injury, right, initial encounter          Care Instructions    Encounter Diagnoses   Name Primary?     Effusion of right knee Yes     Chondrocalcinosis of knee, right      Knee injury, right, initial encounter      Rest, ice and elevate above heart level as needed for pain control  1. We can see you have a lot of swelling on the knee.  We do not feel like you have torn your ACL, you would not have been finish work and the knee would have swelled up right away.  2. We decided on removing the swelling today and doing the cortisone injection.  3. It is possible if the knee continues to give you trouble that you may have a meniscus injury inside the knee but we would only see that with an MRI.  4.We discussed a MRI study that may give us more information but we decided to hold off on that today.  We don't feel you need an MRI at this point.  5. We discussed about getting a new knee brace but you would like to stay with the one you have which is a soft knee brace.  6. Rest ice and elevation is best.  7. I ordered Mobic 15mg once a day times 2 weeks, then take as needed.  Stop this medication if you have any abdominal pain with it.  I sent this to your pharmacy.  You can stop the ibuprofen when you're taking the Mobic.  8. Your x-rays look good other than showing us that you have some swelling and there's some calcification in your joints but this is not a major finding.  9. Listen to your body and let the pain guide your activity, as in slow down if you are having a lot of pain, but increase activity gradually as the pain decreases.   10. We  put exercises below, start these gradually. Focus in the beginning on stretching as in doing extension and flexion of the knee gradually.  11. You can followup with Haley Pride MD in 6 weeks, if you are having pain at that time we can do a cortisone injection.  You can always cancel the appointment if you are doing really well and follow-up as needed.       Cortisone Instructions:     1. You received an injection of cortisone into your right knee today after we took off fluid.  2. The joint(s) may be more painful for the first 1-2 days.  3. We ask you to continue to rest the joint(s) for a few more days before resuming regular activities.  4. Pain Medications you can take (as long as your primary care provider allows these meds and you do not have kidney or liver conditions):  Tylenol  Take 1000 mg by mouth every 6 hours as needed; maximum dose 4000 mg a day  Ibuprofen  600 mg every 6 hours as needed; maximum 2400 mg a day  (OK to take tylenol and ibuprofen at the same time)  5. Rest, ice and elevate as needed for pain control  6. Watch for these signs of infection: redness, swelling, drainage, warmth to touch, increased pain, or fever. Call the clinic or make an appointment to be seen if you think you have an infection.  7. If you are diabetic, make sure you keep a close eye on your blood sugars, they can get elevated with cortisone injections.   8. Sometimes it can take 1-2 weeks for it to reach its full effect.    Cortisone Injections  Cortisone is a type of steroid. It can greatly reduce swelling, redness, and irritation (inflammation) and pain. Being injected with cortisone is simple and doesn t take long. Your doctor may ask you questions about your health. Certain health conditions, such as diabetes, can be affected by cortisone.     Your pain may be relieved by a cortisone injection.   Why have a cortisone injection?  Injecting cortisone can relieve pain for anything from a sports injury to  arthritis. Your doctor may suggest an injection if rest, splints, or oral medicine doesn t relieve your pain. Injecting cortisone is simpler than having surgery. And cortisone may provide the lasting pain relief that can help you get out and enjoy life again.  Getting the injection  Your doctor will start by cleaning and occasionally numbing your skin at the injection site. Next you ll be injected with local anesthetics (for short-term pain relief) and cortisone. The injection may last a few moments. A small bandage will be put over the injection site. You ll then be ready to go home.  After your injection  After being injected, make sure you don t injure the treated region. But stay active. Enjoy a walk or some other mild activity. Just be careful not to strain the region that gave you trouble.  The next day  Some people feel more pain after being injected. This is normal, and it will go away soon. Applying ice for 20 minutes at a time to your injury may reduce the increased pain. Rest for the first day or two. You don t need to stay in bed. But avoid tasks that may strain the injured region.  If you have diabetes  Cortisone injections can cause blood sugar to be increased for several days after the injection. If you have diabetes, you should follow your blood  sugar closely during this time. Follow your regular plan for what to do when your blood sugar is elevated.     1202-6382 [employer] [address] [place] [address], [place] [id number]. All rights reserved. This information is not intended as a substitute for professional medical care. Always follow your healthcare professional's instructions.    Wall Squats for ACL Healing    After you regain muscle control, it s time to build strength. This helps you put full weight on your leg. For best results, warm up and stretch before starting. If your injury is recent, wait until swelling and pain decrease before doing this exercise:    Lean against a wall with  your feet hip-width apart. Your feet should be about 18 inches from the wall.    Slowly slide down to a near-sitting position. Don t let your knees go past 90 degrees.    Hold for 10 seconds, then slide back up.    Repeat 5 times.     CAUTION: Do this exercise only if your healthcare provider says it s OK.     9257-6778 [employer] [address] [place] [address]. All rights reserved. This information is not intended as a substitute for professional medical care. Always follow your healthcare professional's instructions.        Leg and Knee Exercises: Leg Raise    This exercise is designed to stretch and strengthen your knee. Before beginning, read through all the instructions. While exercising, breathe normally and use smooth movements. If you feel any pain, stop the exercise. If pain persists, call your healthcare provider.  Caution    Don t arch your back.    Don t hunch your shoulders.     Sit on the floor with your_________ leg straight, the other bent.    Tighten the thigh muscles on the top of your straight leg. You should feel the muscles contract. Raise that leg 6-8 inches. Then lower it slowly and steadily to the floor. Relax.    Repeat ______ times.  Do ______ sets a day.    4086-8324 [employer] [address] [place] [address]. All rights reserved. This information is not intended as a substitute for professional medical care. Always follow your healthcare professional's instructions.        Quad Set for Leg and Knee    This exercise is designed to stretch and strengthen your knee. Before beginning, read through all the instructions. While exercising, breathe normally and use smooth movements. If you feel any pain, stop the exercise. If pain persists, call your healthcare provider.  1.  Sit on the floor with one leg straight, the other bent.  2.  Flex the foot of your straight leg by pointing your toes toward you. Press the back of your knee into the  floor while tightening the muscle on the top of your thigh. Hold for ______ seconds. Then relax.  3.  Repeat ______ times. Do ______ sets a day.  Caution    Don t arch your back.    Don t hunch your shoulders.    0324-0223 [employer] [address] [place] [address]. All rights reserved. This information is not intended as a substitute for professional medical care. Always follow your healthcare professional's instructions.         [employer] and [employer] may offer reliable information regarding your diagnosis and treatment plan.    THANK YOU for coming in today. If you receive a survey via [employer] or mail please let us know if there was anything you especially appreciated today or if there is any way we can improve our clinic. We appreciate your input.    GENERAL INFORMATION:  Our hours are:  Monday :     Clinic 7:30 AM-430 PM ([facility])  Tuesday:      Operating Room All Day ([facility])  Wednesday: Clinic 7:30 AM - 11:15 AM ([facility])             Clinic 1:00 PM - 4:00PM ([facility])  Thursday:     Administrative Day  Friday:          Clinic 7:30 AM - 11:15 AM ([facility])            Clinic 1:00 PM - 4:00 PM ([facility])      Bone and Joint Service Line for any issues or concerns: [phone]      We are not in the office Thursdays. Therefore non- urgent calls and medical messages received on Thursday will be addressed when we are back in the office on Wednesday. Urgent matters will be reviewed and addressed by one of our partners in the office as needed.    If lab work was done today as part of your evaluation you will generally be contacted via [employer], mail, or phone with the results within 1-5 days. If there is an alarming result we will contact you by phone. Lab results come back at varying times, I generally wait until all labs are resulted before  "making comments on results. Please note labs are automatically released to [employer] (if you have signed up for it) once available-at times you may see these prior to my having a chance to review them as well.    If you need refills please contact your pharmacist. They will send a refill request to me to review. Please allow 3 business days for us to process all refill requests. All narcotic refills should be handled in the clinic at the time of your visit.            Follow-ups after your visit        Who to contact     If you have questions or need follow up information about today's clinic visit or your schedule please contact [facility] directly at [phone].  Normal or non-critical lab and imaging results will be communicated to you by [employer], letter or phone within 4 business days after the clinic has received the results. If you do not hear from us within 7 days, please contact the clinic through [employer] or phone. If you have a critical or abnormal lab result, we will notify you by phone as soon as possible.  Submit refill requests through [employer] or call your pharmacy and they will forward the refill request to us. Please allow 3 business days for your refill to be completed.          Additional Information About Your Visit        EndPlay Information     EndPlay lets you send messages to your doctor, view your test results, renew your prescriptions, schedule appointments and more. To sign up, go to [email or web address] . Click on \"Log in\" on the left side of the screen, which will take you to the Welcome page. Then click on \"Sign up Now\" on the right side of the page.     You will be asked to enter the access code listed below, as well as some personal information. Please follow the directions to create your username and password.     Your access code is: [email or web address]  Expires: 2017  2:03 PM     Your access code will  in 90 days. If you need help or a new code, please call " "your [place] [facility] clinic or [phone].        Care EveryWhere ID     This is your Care EveryWhere ID. This could be used by other organizations to access your [place] [facility] medical records  [id number]        Your Vitals Were     Temperature Height BMI (Body Mass Index)             97.9  F (36.6  C) 1.803 m (5' 11\") 26.92 kg/m2          Blood Pressure from Last 3 Encounters:   09/21/17 114/80   12/22/16 118/80   10/17/16 130/72    Weight from Last 3 Encounters:   09/25/17 87.5 kg (193 lb)   09/21/17 87.8 kg (193 lb 9.6 oz)   12/22/16 88.9 kg (196 lb)              We Performed the Following     Kenalog 40 MG  []     Large Joint/Bursa injection and/or drainage (Shoulder, Knee)          Today's Medication Changes          These changes are accurate as of: 9/25/17  8:53 AM.  If you have any questions, ask your nurse or doctor.               Start taking these medicines.        Dose/Directions    meloxicam 15 MG tablet   Commonly known as:  MOBIC   Used for:  Effusion of right knee, Chondrocalcinosis of knee, right   Started by:  Haley Pride MD        Dose:  15 mg   Take 1 tablet (15 mg) by mouth daily   Quantity:  30 tablet   Refills:  1            Where to get your medicines      These medications were sent to [facility] [address]  [address] [facility] , [facility] [address]     Phone:  [phone]     meloxicam 15 MG tablet                Primary Care Provider    None Specified       No primary provider on file.        Equal Access to Services     DEVI GALINDO AH: Hadii lea Persaud, waaxda luqadaha, qaybta kaaljethro hoang. So [facility] [phone].    ATENCIÓN: Si habla español, tiene a guidry disposición servicios gratuitos de asistencia lingüística. Llame al [phone].    We comply with applicable federal civil rights laws and [place] laws. We do not discriminate on the basis of race, color, national origin, age, " "disability sex, sexual orientation or gender identity.            Thank you!     Thank you for choosing [facility]  for your care. Our goal is always to provide you with excellent care. Hearing back from our patients is one way we can continue to improve our services. Please take a few minutes to complete the written survey that you may receive in the mail after your visit with us. Thank you!             Your Updated Medication List - Protect others around you: Learn how to safely use, store and throw away your medicines at www.disposemymeds.org.          This list is accurate as of: 9/25/17  8:53 AM.  Always use your most recent med list.                   Brand Name Dispense Instructions for use Diagnosis    ACCU-CHEK COMFORT CURVE test strip   Generic drug:  blood glucose monitoring     100 each    5 times daily    Type I (juvenile type) diabetes mellitus without mention of complication, not stated as uncontrolled       aspirin 81 MG tablet     100 tablet    Take 2 tablets (162 mg) by mouth daily        fluticasone 50 MCG/ACT spray    FLONASE    16 g    Spray 1-2 sprays into both nostrils daily    Acute recurrent maxillary sinusitis       INSULIN SYRINGE-NEEDLE U-100 29G X 1/2\" 0.25 ML Misc      insulin pump        lisinopril 5 MG tablet    PRINIVIL/ZESTRIL    30    1 1/2 TABLET DAILY        meloxicam 15 MG tablet    MOBIC    30 tablet    Take 1 tablet (15 mg) by mouth daily    Effusion of right knee, Chondrocalcinosis of knee, right       metroNIDAZOLE 500 MG tablet    FLAGYL    30 tablet    Take 1 tablet (500 mg) by mouth 3 times daily for 10 days    C. difficile colitis       Multi-vitamin Tabs tablet     100 tablet    Take 1 tablet by mouth daily        NOVOLOG SC           SIMVASTATIN PO             "

## 2017-09-25 NOTE — Clinical Note
9/25/2017         RE: Enmanuel Mckeon  [address]  [facility] [address]        Dear Colleague,    Thank you for referring your patient, Enmanuel Mckeon, to the [facility]. Please see a copy of my visit note below.    Enmanuel Mckeon is a 57 year old male who is seen in consultation at the request of Dr. Calles  History of Present illness:  Enmanuel presents for evaluation of:  1.) rt knee pain  2.)   Onset: 9/20/17  Symptoms brought on by fell off a drillers platform about 3 feet.   Character:  {SR Pain Characteristics:206058}.    Progression of symptoms:  same.    Previous similar pain: no .   Pain Level:  3/10.   Previous treatments:  ice and Ibuprofen.  Currently on Blood thinners? no  Diagnosis of Diabetes? 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 He enjoys fishing golfing and hunting. He has a son and 2 daughters. One of his daughters is getting  in 2 weeks.     He is being seen for   Chief Complaints and History of Present Illnesses   Patient presents with     Consult     rt knee pain and swelling per Dr. Calles         History of Present Illness:   Enmanuel Mckeon is a 57 year old male who is seen in consultation at the request of Dr. Calles  History of Present illness:  Enmanuel presents for evaluation of:  1.) rt knee pain  Onset: 9/20/17  Symptoms brought on by fell off a drillers platform about 3 feet. When he fell he landed and twisted his knee he thought. He did not hear pop.  Character:  dull ache now. .    Progression of symptoms:  same.    Previous similar pain: no, denies any previous surgery or previous injury to the right knee  Pain Level:  3/10.   Previous treatments:  ice and Ibuprofen. Patient has been icing and elevating which has not helped all that much although the ibuprofen he takes 400 mg about every 6 hours has been helping. He also put on  "a soft breeze but is unsure if that's helped. He has not had any formal therapy or exercises or any injections.  Currently on Blood thinners? no  Diagnosis of Diabetes? Yes   Additional History: patient denies any catching or locking of the knee or any numbness or tingling. When the injury happened he was able to finish the rest of the day and it did not swell up right away until the next day. Patient denies any swelling like a grapefruit. Patient denies any bruising or hearing a pop. Patient has been using a crotch but he can ambulate on it without problems. Patient states that the pain is on the back of his knee but also under the kneecap and sometimes feels some pain laterally.    Patient's past medical, surgical, social and family histories reviewed.     Past Medical History:   Diagnosis Date     Diabetic eye exam (H) 08/12/11     Diabetic eye exam (H) 08/24/12, 2/14/14     Diabetic eye exam (H) 1/23/15     Essential hypertension, benign      Type I (juvenile type) diabetes mellitus without mention of complication, not stated as uncontrolled          Past Surgical History:   Procedure Laterality Date     HC COLONOSCOPY W/WO BRUSH/WASH  01/27/09       Medications:    Current Outpatient Prescriptions on File Prior to Visit:  metroNIDAZOLE (FLAGYL) 500 MG tablet Take 1 tablet (500 mg) by mouth 3 times daily for 10 days   SIMVASTATIN PO    Insulin Aspart (NOVOLOG SC)    fluticasone (FLONASE) 50 MCG/ACT nasal spray Spray 1-2 sprays into both nostrils daily (Patient not taking: Reported on 9/21/2017)   aspirin 81 MG tablet Take 2 tablets (162 mg) by mouth daily   glucose blood VI test strips (ACCU-CHEK COMFORT CURVE) strip 5 times daily   multivitamin, therapeutic with minerals (MULTI-VITAMIN) TABS Take 1 tablet by mouth daily   LISINOPRIL 5 MG OR TABS 1 1/2 TABLET DAILY   INSULIN SYRINGE-NEEDLE U-100 MISC 0.25 ML 29 X 1/2\" insulin pump     No current facility-administered medications on file prior to visit. " "    Allergies   Allergen Reactions     No Known Drug Allergies        Social History     Occupational History     Not on file.     Social History Main Topics     Smoking status: Former Smoker     Smokeless tobacco: Former User      Comment: quite 20 years ago     Alcohol use 0.0 oz/week     0 Standard drinks or equivalent per week      Comment: occasional     Drug use: No     Sexual activity: Yes     Partners: Female       Family History   Problem Relation Age of Onset     Alcohol/Drug Sister      DIABETES Father      HEART DISEASE Father      4 way bypass     Neurologic Disorder Mother      anurism       REVIEW OF SYSTEMS  10 point review systems performed otherwise negative as noted as per history of present illness.    Physical Exam:  Vitals: Temp 97.9  F (36.6  C)  Ht 1.803 m (5' 11\")  Wt 87.5 kg (193 lb)  BMI 26.92 kg/m2  BMI= Body mass index is 26.92 kg/(m^2).    Constitutional: healthy, alert and no acute distress   Psychiatric: mentation appears normal and affect normal/bright  NEURO: no focal deficits, CMS intact right lower extremity  RESP: Normal with easy respirations and no use of accessory muscles to breathe, no audible wheezing or retractions  CV: Calf soft and nontender to palpation, leg warm   SKIN: No erythema, rashes, excoriation, or breakdown. No evidence of infection.   MUSCULOSKELETAL:    INSPECTION of right knee: we are able to visualize swelling of the right knee when compared to the left. No gross deformities, erythema, ecchymosis, atrophy or fasciculations.     PALPATION: patient had very generalized and minimal tenderness to palpation of the posterior knee. No tenderness on palpation of the medial, lateral, anterior portion of the knee. No specific joint line tenderness. No increased warmth.     ROM: Extension about 5  short of full extension, flexion to approximately 100 . All range of motion without catching, locking or but he does have pain secondary to the swelling at maximal " flexion and extension. I am unable to note any crepitus and the patella track centrally.       STRENGTH: able to do a straight leg raise and fire his quad. Able to flex the knee against gravity    SPECIAL TEST: Patient has a negative Lachman's negative drawer sign however this did seem to have some laxity when compared to the contralateral side it was nearly exactly the same. Patient's knee is stable to varus and valgus stress at 30  of flexion. Patient has a positive Ihsan's.   GAIT: antalgic gait on the right. Patient brought in a crotch today to get around better.  Lymph: no palpable lymph nodes    Diagnostic Modalities:  Recent Results (from the past 744 hour(s))   XR Knee Right 3 Views    Narrative    RIGHT KNEE THREE VIEWS  9/25/2017 8:05 AM     HISTORY: Pain in right knee.    COMPARISON: None.      Impression    IMPRESSION: Mild to moderate knee effusion. Patellofemoral  articulation intact. No definite acute fracture. Chondrocalcinosis in  both medial and lateral knee compartments.    BARBARA NG MD     Agree with the above reading  Independent visualization of the images was performed.    Impression: 1. Right knee effusion. 2. Right knee injury. 3. Right knee chondrocalcinosis medial and lateral    Plan:  All of the above pertinent physical exam and imaging modalities findings was reviewed with Enmanuel.                                          INJECTION PROCEDURE:  The patient was counseled about an aspiration and injection, including discussion of risks (including infection), contents of the procedure, rationale for performing the procedure, and expected benefits of the aspiration and injection. The patient was placed in the supine position. The skin was prepped with alcohol and betadine and then utilizing sterile technique an aspiration and injection of the right knee joint from the superior lateral approach. I used chris chloride spray prior to doing the aspiration. I injected 3 ml of 1% lidocaine  then removed the needle.  After good anesthetic was achieved, I then used chris chloride again and put in an 18 gauge needed in the same location.  I moved the needle around to get all the fluid I could.  I aspirated 74 ml of keith joint fluid.  Then I removed the syringe but left the needle in the knee.  I attached the pre drawn up cortisone injection.  The injection consisted 1ml of Kenalog (40mg per 1ml) with 8ml 1% lidocaine plain. The patient tolerated the aspiration and injection well, and there were no complications. The injection site was cleaned and covered with a Band-Aid. The injection was performed by ARNOLD Moscoso      Patient Instructions:  1. We can see you have a lot of swelling on the knee.  We do not feel like you have torn your ACL, you would not have been finish work and the knee would have swelled up right away.  2. We decided on removing the swelling today and doing the cortisone injection.  3. It is possible if the knee continues to give you trouble that you may have a meniscus injury inside the knee but we would only see that with an MRI.  4.We discussed a MRI study that may give us more information but we decided to hold off on that today.  We don't feel you need an MRI at this point.  5. We discussed about getting a new knee brace but you would like to stay with the one you have which is a soft knee brace.  6. Rest ice and elevation is best.  7. I ordered Mobic 15mg once a day times 2 weeks, then take as needed.  Stop this medication if you have any abdominal pain with it.  I sent this to your pharmacy.  You can stop the ibuprofen when you're taking the Mobic.  8. Your x-rays look good other than showing us that you have some swelling and there's some calcification in your joints but this is not a major finding.  9. Listen to your body and let the pain guide your activity, as in slow down if you are having a lot of pain, but increase activity gradually as the pain decreases.   10. We put  exercises below, start these gradually. Focus in the beginning on stretching as in doing extension and flexion of the knee gradually.  11. 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   12. You can followup with Haley Pride MD in 6 weeks, if you are having pain at that time we can do a cortisone injection.  You can always cancel the appointment if you are doing really well and follow-up as needed.  Re-x-ray on return: No    Scribed by Hector Nguyen PA-C on 9/25/2017 at 9:08 AM, based on Dr. Haley Pride's statements to me.    This note was dictated with [employer].    PAIGE Millan MD      Again, thank you for allowing me to participate in the care of your patient.        Sincerely,        Haley Pride MD

## 2017-09-25 NOTE — NURSING NOTE
"Chief Complaint   Patient presents with     Consult     rt knee pain and swelling per Dr. Calles       Initial Temp 97.9  F (36.6  C)  Ht 1.803 m (5' 11\")  Wt 87.5 kg (193 lb)  BMI 26.92 kg/m2 Estimated body mass index is 26.92 kg/(m^2) as calculated from the following:    Height as of this encounter: 1.803 m (5' 11\").    Weight as of this encounter: 87.5 kg (193 lb).  Medication Reconciliation: complete    BP completed using cuff size: NA (Not Taken)    Luann Muñoz MA      "

## 2017-11-16 DIAGNOSIS — E78.2 MIXED HYPERLIPIDEMIA: ICD-10-CM

## 2017-11-16 DIAGNOSIS — E10.9 DIABETES MELLITUS TYPE I (H): Primary | ICD-10-CM

## 2017-11-16 LAB
ALBUMIN SERPL-MCNC: 3.5 G/DL (ref 3.4–5)
ANION GAP SERPL CALCULATED.3IONS-SCNC: 7 MMOL/L (ref 3–14)
BUN SERPL-MCNC: 11 MG/DL (ref 7–30)
CALCIUM SERPL-MCNC: 8.5 MG/DL (ref 8.5–10.1)
CHLORIDE SERPL-SCNC: 107 MMOL/L (ref 94–109)
CHOLEST SERPL-MCNC: 189 MG/DL
CO2 SERPL-SCNC: 26 MMOL/L (ref 20–32)
CREAT SERPL-MCNC: 0.8 MG/DL (ref 0.66–1.25)
CREAT UR-MCNC: 5 MG/DL
GFR SERPL CREATININE-BSD FRML MDRD: >90 ML/MIN/1.7M2
GLUCOSE SERPL-MCNC: 146 MG/DL (ref 70–99)
HBA1C MFR BLD: 7.9 % (ref 4.3–6)
HDLC SERPL-MCNC: 111 MG/DL
LDLC SERPL CALC-MCNC: 67 MG/DL
MICROALBUMIN UR-MCNC: 6 MG/L
MICROALBUMIN/CREAT UR: 133.54 MG/G CR (ref 0–17)
NONHDLC SERPL-MCNC: 78 MG/DL
PHOSPHATE SERPL-MCNC: 3 MG/DL (ref 2.5–4.5)
POTASSIUM SERPL-SCNC: 4.6 MMOL/L (ref 3.4–5.3)
SODIUM SERPL-SCNC: 140 MMOL/L (ref 133–144)
TRIGL SERPL-MCNC: 57 MG/DL

## 2017-11-16 PROCEDURE — 36415 COLL VENOUS BLD VENIPUNCTURE: CPT | Performed by: INTERNAL MEDICINE

## 2017-11-16 PROCEDURE — 82043 UR ALBUMIN QUANTITATIVE: CPT | Performed by: INTERNAL MEDICINE

## 2017-11-16 PROCEDURE — 83036 HEMOGLOBIN GLYCOSYLATED A1C: CPT | Mod: QW | Performed by: INTERNAL MEDICINE

## 2017-11-16 PROCEDURE — 80069 RENAL FUNCTION PANEL: CPT | Performed by: INTERNAL MEDICINE

## 2017-11-16 PROCEDURE — 80061 LIPID PANEL: CPT | Performed by: INTERNAL MEDICINE

## 2018-01-31 ENCOUNTER — ALLIED HEALTH/NURSE VISIT (OUTPATIENT)
Dept: FAMILY MEDICINE | Facility: CLINIC | Age: 58
End: 2018-01-31
Payer: COMMERCIAL

## 2018-01-31 DIAGNOSIS — Z23 NEED FOR PROPHYLACTIC VACCINATION AND INOCULATION AGAINST INFLUENZA: Primary | ICD-10-CM

## 2018-01-31 PROCEDURE — 90686 IIV4 VACC NO PRSV 0.5 ML IM: CPT

## 2018-01-31 PROCEDURE — 90471 IMMUNIZATION ADMIN: CPT

## 2018-01-31 NOTE — PROGRESS NOTES
Injectable Influenza Immunization Documentation    1.  Is the person to be vaccinated sick today?   No    2. Does the person to be vaccinated have an allergy to a component   of the vaccine?   No  Egg Allergy Algorithm Link    3. Has the person to be vaccinated ever had a serious reaction   to influenza vaccine in the past?   No    4. Has the person to be vaccinated ever had Guillain-Barré syndrome?   No    Form completed by Carol Ann Barrett CMA      Prior to injection verified patient identity using patient's name and date of birth. Patient instructed to remain in clinic for 20 minutes afterwards, and to report any adverse reaction to me immediately.

## 2018-01-31 NOTE — MR AVS SNAPSHOT
"              After Visit Summary   2018    Enmanuel Mckeon    MRN: [id number]           Patient Information     Date Of Birth          1960        Visit Information        Provider Department      2018 11:30 AM BIANCA JAY MA [facility]        Today's Diagnoses     Need for prophylactic vaccination and inoculation against influenza    -  1       Follow-ups after your visit        Who to contact     If you have questions or need follow up information about today's clinic visit or your schedule please contact [facility] directly at [phone].  Normal or non-critical lab and imaging results will be communicated to you by Pay-Mehart, letter or phone within 4 business days after the clinic has received the results. If you do not hear from us within 7 days, please contact the clinic through [employer] or phone. If you have a critical or abnormal lab result, we will notify you by phone as soon as possible.  Submit refill requests through [facility] or call your pharmacy and they will forward the refill request to us. Please allow 3 business days for your refill to be completed.          Additional Information About Your Visit        MyChart Information     [facility] lets you send messages to your doctor, view your test results, renew your prescriptions, schedule appointments and more. To sign up, go to [email or web address] . Click on \"Log in\" on the left side of the screen, which will take you to the Welcome page. Then click on \"Sign up Now\" on the right side of the page.     You will be asked to enter the access code listed below, as well as some personal information. Please follow the directions to create your username and password.     Your access code is: [email or web address]  Expires: 2018 11:43 AM     Your access code will  in 90 days. If you need help or a new code, please call your [facility] or [phone].        Care EveryWhere ID     This is your Care EveryWhere " ID. This could be used by other organizations to access your [facility] medical records  [id number]         Blood Pressure from Last 3 Encounters:   09/21/17 114/80   12/22/16 118/80   10/17/16 130/72    Weight from Last 3 Encounters:   09/25/17 193 lb (87.5 kg)   09/21/17 193 lb 9.6 oz (87.8 kg)   12/22/16 196 lb (88.9 kg)              We Performed the Following     FLU VAC, SPLIT VIRUS IM > 3 YO (QUADRIVALENT) [15932]     Vaccine Administration, Initial [89544]        Primary Care Provider Fax #    Physician No Ref-Primary [phone]       No address on file        Equal Access to Services     DEVI GALINDO : Shayne Persaud, mel millard, michael michel, jethro zhou. So [facility] [phone].    ATENCIÓN: Si habla español, tiene a guidry disposición servicios gratuitos de asistencia lingüística. Llame al [phone].    We comply with applicable federal civil rights laws and [place] laws. We do not discriminate on the basis of race, color, national origin, age, disability, sex, sexual orientation, or gender identity.            Thank you!     Thank you for choosing [facility]  for your care. Our goal is always to provide you with excellent care. Hearing back from our patients is one way we can continue to improve our services. Please take a few minutes to complete the written survey that you may receive in the mail after your visit with us. Thank you!             Your Updated Medication List - Protect others around you: Learn how to safely use, store and throw away your medicines at www.disposemymeds.org.          This list is accurate as of 1/31/18 11:43 AM.  Always use your most recent med list.                   Brand Name Dispense Instructions for use Diagnosis    ACCU-CHEK COMFORT CURVE test strip   Generic drug:  blood glucose monitoring     100 each    5 times daily    Type I (juvenile type) diabetes mellitus without mention of  "complication, not stated as uncontrolled       aspirin 81 MG tablet     100 tablet    Take 2 tablets (162 mg) by mouth daily        fluticasone 50 MCG/ACT spray    FLONASE    16 g    Spray 1-2 sprays into both nostrils daily    Acute recurrent maxillary sinusitis       INSULIN SYRINGE-NEEDLE U-100 29G X 1/2\" 0.25 ML Misc      insulin pump        lisinopril 5 MG tablet    PRINIVIL/ZESTRIL    30    1 1/2 TABLET DAILY        meloxicam 15 MG tablet    MOBIC    30 tablet    Take 1 tablet (15 mg) by mouth daily    Effusion of right knee, Chondrocalcinosis of knee, right       Multi-vitamin Tabs tablet     100 tablet    Take 1 tablet by mouth daily        NOVOLOG SC           SIMVASTATIN PO             "

## 2018-02-02 ENCOUNTER — OFFICE VISIT (OUTPATIENT)
Dept: URGENT CARE | Facility: RETAIL CLINIC | Age: 58
End: 2018-02-02
Payer: COMMERCIAL

## 2018-02-02 VITALS — TEMPERATURE: 98.1 F | HEART RATE: 53 BPM | DIASTOLIC BLOOD PRESSURE: 89 MMHG | SYSTOLIC BLOOD PRESSURE: 141 MMHG

## 2018-02-02 DIAGNOSIS — J01.90 ACUTE SINUSITIS WITH COEXISTING CONDITION REQUIRING PROPHYLACTIC TREATMENT: ICD-10-CM

## 2018-02-02 DIAGNOSIS — R09.81 NASAL CONGESTION: Primary | ICD-10-CM

## 2018-02-02 PROCEDURE — 99203 OFFICE O/P NEW LOW 30 MIN: CPT | Performed by: NURSE PRACTITIONER

## 2018-02-02 RX ORDER — AZITHROMYCIN 250 MG/1
TABLET, FILM COATED ORAL
Qty: 6 TABLET | Refills: 0 | Status: SHIPPED | OUTPATIENT
Start: 2018-02-02 | End: 2018-02-07

## 2018-02-02 NOTE — NURSING NOTE
"Chief Complaint   Patient presents with     Sinus Problem     x about a month       Initial /89  Pulse 53  Temp 98.1  F (36.7  C) (Oral) Estimated body mass index is 26.92 kg/(m^2) as calculated from the following:    Height as of 9/25/17: 5' 11\" (1.803 m).    Weight as of 9/25/17: 193 lb (87.5 kg).  Medication Reconciliation: complete   Latasha Tierney      "

## 2018-02-02 NOTE — MR AVS SNAPSHOT
"              After Visit Summary   2018    Enmanuel Mckeon    MRN: [id number]           Patient Information     Date Of Birth          1960        Visit Information        Provider Department      2018 11:00 AM Cuong Young APRN [facility]        Today's Diagnoses     Nasal congestion    -  1    Acute sinusitis with coexisting condition requiring prophylactic treatment           Follow-ups after your visit        Who to contact     You can reach your care team any time of the day by calling [phone].  Notification of test results:  If you have an abnormal lab result, we will notify you by phone as soon as possible.         Additional Information About Your Visit        MyChart Information     [facility] lets you send messages to your doctor, view your test results, renew your prescriptions, schedule appointments and more. To sign up, go to [email or web address] . Click on \"Log in\" on the left side of the screen, which will take you to the Welcome page. Then click on \"Sign up Now\" on the right side of the page.     You will be asked to enter the access code listed below, as well as some personal information. Please follow the directions to create your username and password.     Your access code is: [email or web address]  Expires: 2018 11:43 AM     Your access code will  in 90 days. If you need help or a new code, please call your [place] clinic or [phone].        Care EveryWhere ID     This is your [facility] EveryWhere ID. This could be used by other organizations to access your [place] medical records  [id number]        Your Vitals Were     Pulse Temperature                53 98.1  F (36.7  C) (Oral)           Blood Pressure from Last 3 Encounters:   18 141/89   17 114/80   16 118/80    Weight from Last 3 Encounters:   17 193 lb (87.5 kg)   17 193 lb 9.6 oz (87.8 kg)   16 196 lb (88.9 kg)              Today, you had the " following     No orders found for display         Today's Medication Changes          These changes are accurate as of 2/2/18 11:20 AM.  If you have any questions, ask your nurse or doctor.               Start taking these medicines.        Dose/Directions    azithromycin 250 MG tablet   Commonly known as:  ZITHROMAX   Used for:  Acute sinusitis with coexisting condition requiring prophylactic treatment        Take 2 tablets today, then take 1 tablet for the next 4 days.   Quantity:  6 tablet   Refills:  0            Where to get your medicines      These medications were sent to [address]  [address], [place] [address]     Phone:  [phone]     azithromycin 250 MG tablet                Primary Care Provider Fax #    Physician No Ref-Primary [phone]       No address on file        Equal Access to Services     DEVI GALINDO : Shayne Persaud, waaxda luqadaha, qaybta kaalmada adeegyada, jethro house . So [facility] [phone].    ATENCIÓN: Si habla español, tiene a guidry disposición servicios gratuitos de asistencia lingüística. [facility] [phone].    We comply with applicable federal civil rights laws and [place] laws. We do not discriminate on the basis of race, color, national origin, age, disability, sex, sexual orientation, or gender identity.            Thank you!     Thank you for choosing [facility]  for your care. Our goal is always to provide you with excellent care. Hearing back from our patients is one way we can continue to improve our services. Please take a few minutes to complete the written survey that you may receive in the mail after your visit with us. Thank you!             Your Updated Medication List - Protect others around you: Learn how to safely use, store and throw away your medicines at www.disposemymeds.org.          This list is accurate as of 2/2/18 11:20 AM.  Always use your most  "recent med list.                   Brand Name Dispense Instructions for use Diagnosis    ACCU-CHEK COMFORT CURVE test strip   Generic drug:  blood glucose monitoring     100 each    5 times daily    Type I (juvenile type) diabetes mellitus without mention of complication, not stated as uncontrolled       aspirin 81 MG tablet     100 tablet    Take 2 tablets (162 mg) by mouth daily        azithromycin 250 MG tablet    ZITHROMAX    6 tablet    Take 2 tablets today, then take 1 tablet for the next 4 days.    Acute sinusitis with coexisting condition requiring prophylactic treatment       fluticasone 50 MCG/ACT spray    FLONASE    16 g    Spray 1-2 sprays into both nostrils daily    Acute recurrent maxillary sinusitis       INSULIN SYRINGE-NEEDLE U-100 29G X 1/2\" 0.25 ML Misc      insulin pump        lisinopril 5 MG tablet    PRINIVIL/ZESTRIL    30    1 1/2 TABLET DAILY        meloxicam 15 MG tablet    MOBIC    30 tablet    Take 1 tablet (15 mg) by mouth daily    Effusion of right knee, Chondrocalcinosis of knee, right       Multi-vitamin Tabs tablet     100 tablet    Take 1 tablet by mouth daily        NOVOLOG SC           SIMVASTATIN PO             "

## 2018-02-02 NOTE — PROGRESS NOTES
[facility] clinic note    SUBJECTIVE:    Enmanuel Mckeon is a 57 year old male who presents to [facility] clinic with the following symptoms:  Facial pain for seven days or more  Purulent nasal discharge, blood in nasal discharge  Post nasal drainage is significant   Failure of over-the-counter nasal decongestants or other medications  History of sinusitis in the past  Mild to moderate facial swelling  Pain in the teeth or near a certain tooth  Cough   Mild sore throat.  Poor Sleep    The patient denies a history of:  Fever >102.5  Orbital pain  Periorbital swelling or erythema  Severe facial swelling or erythema  Severe neck pain  Vision changes    PAST MEDICAL HISTORY:   Past Medical History:   Diagnosis Date     Diabetic eye exam (H) 08/12/11     Diabetic eye exam (H) 08/24/12, 2/14/14     Diabetic eye exam (H) 1/23/15     Essential hypertension, benign      Type I (juvenile type) diabetes mellitus without mention of complication, not stated as uncontrolled        PAST SURGICAL HISTORY:   Past Surgical History:   Procedure Laterality Date     HC COLONOSCOPY W/WO BRUSH/WASH  01/27/09       FAMILY HISTORY:   Family History   Problem Relation Age of Onset     Alcohol/Drug Sister      DIABETES Father      HEART DISEASE Father      4 way bypass     Neurologic Disorder Mother      anurism       SOCIAL HISTORY:   Social History   Substance Use Topics     Smoking status: Former Smoker     Smokeless tobacco: Former User      Comment: quite 20 years ago     Alcohol use 0.0 oz/week     0 Standard drinks or equivalent per week      Comment: occasional       Current Outpatient Prescriptions   Medication     meloxicam (MOBIC) 15 MG tablet     SIMVASTATIN PO     Insulin Aspart (NOVOLOG SC)     fluticasone (FLONASE) 50 MCG/ACT nasal spray     aspirin 81 MG tablet     glucose blood VI test strips (ACCU-CHEK COMFORT CURVE) strip     multivitamin, therapeutic with minerals (MULTI-VITAMIN)  "TABS     LISINOPRIL 5 MG OR TABS     INSULIN SYRINGE-NEEDLE U-100 MISC 0.25 ML 29 X 1/2\"     No current facility-administered medications for this visit.        OBJECTIVE:  This patient appears today in in moderate distress.  Vitals:    02/02/18 1049   BP: 141/89   Pulse: 53   Temp: 98.1  F (36.7  C)   TempSrc: Oral     HEENT:  Facial tenderness located over the maxillary sinus region       Tenderness is bilateral.  Purulent nasal discharge present from both sides.  Tympanic membranes are clear and without bulging or erythema  Extraoccular movements are free and intact  Sclera, cornea and conjunctiva are clear  No proptosis  No significant periorbital edema or erythema  Throat is clear without significant erythema, tonsillar swelling or exudates  Post nasal drainage is present  NECK:  Soft and supple without significant tenderness or adenopathy  RESP:  Clear to auscultation without wheezing, rales or ronchi    ASSESSMENT:   Nasal congestion  Acute sinusitis with coexisting condition requiring prophylactic treatment    PLAN:  Current Outpatient Prescriptions   Medication     azithromycin (ZITHROMAX) 250 MG tablet     meloxicam (MOBIC) 15 MG tablet     SIMVASTATIN PO     Insulin Aspart (NOVOLOG SC)     fluticasone (FLONASE) 50 MCG/ACT nasal spray     aspirin 81 MG tablet     glucose blood VI test strips (ACCU-CHEK COMFORT CURVE) strip     multivitamin, therapeutic with minerals (MULTI-VITAMIN) TABS     LISINOPRIL 5 MG OR TABS     INSULIN SYRINGE-NEEDLE U-100 MISC 0.25 ML 29 X 1/2\"     No current facility-administered medications for this visit.      Afrin nasal spray as needed for up to 3 days.  Apply warm facial packs for 5-10 minutes three times daily.  Drink plenty of fluids- 6 to 10 glasses of liquids each day.  Elevate head of the bed.  Increase the humidity level in the home.  Rest.  Saline drops or nasal sprays as needed.  Take warm, steamy showers to reduce congestion.  Tylenol or ibuprofen as needed for " discomfort.  Contact primary care clinic for increasing pain, high fever, vision changes, worsening symptoms, or no relief from symptoms after 7-10 days.  May drink tea /c honey to sooth throat.    Symptomatic treatment or other home remedies as discussed & reviewed.   Use of a nasal flush discussed with Enmanuel Mckeon as a good treatment option.   OTC Mucinex (or generic) may help to loosen congestion as well.  Rest as needed.  Avoid items which you are or maybe allergic.  Add moisture to the air with a humidifier or a vaporizer &/or inhale steam from a basin of hot water or shower.  Follow up at:  [facility] [phone]    Cuong Young MSN, APRN, Family NP-C  [facility]

## 2018-04-07 DIAGNOSIS — E10.9 DIABETES MELLITUS TYPE I (H): Primary | ICD-10-CM

## 2018-04-07 DIAGNOSIS — E78.2 MIXED HYPERLIPIDEMIA: ICD-10-CM

## 2018-04-07 LAB
ALBUMIN SERPL-MCNC: 3.6 G/DL (ref 3.4–5)
ANION GAP SERPL CALCULATED.3IONS-SCNC: 6 MMOL/L (ref 3–14)
BUN SERPL-MCNC: 13 MG/DL (ref 7–30)
CALCIUM SERPL-MCNC: 8.2 MG/DL (ref 8.5–10.1)
CHLORIDE SERPL-SCNC: 108 MMOL/L (ref 94–109)
CHOLEST SERPL-MCNC: 202 MG/DL
CO2 SERPL-SCNC: 28 MMOL/L (ref 20–32)
CREAT SERPL-MCNC: 0.71 MG/DL (ref 0.66–1.25)
CREAT UR-MCNC: 127 MG/DL
GFR SERPL CREATININE-BSD FRML MDRD: >90 ML/MIN/1.7M2
GLUCOSE SERPL-MCNC: 91 MG/DL (ref 70–99)
HDLC SERPL-MCNC: 106 MG/DL
LDLC SERPL CALC-MCNC: 87 MG/DL
MICROALBUMIN UR-MCNC: 14 MG/L
MICROALBUMIN/CREAT UR: 10.63 MG/G CR (ref 0–17)
NONHDLC SERPL-MCNC: 96 MG/DL
PHOSPHATE SERPL-MCNC: 3 MG/DL (ref 2.5–4.5)
POTASSIUM SERPL-SCNC: 4.3 MMOL/L (ref 3.4–5.3)
SODIUM SERPL-SCNC: 142 MMOL/L (ref 133–144)
TRIGL SERPL-MCNC: 44 MG/DL

## 2018-04-07 PROCEDURE — 80069 RENAL FUNCTION PANEL: CPT | Performed by: INTERNAL MEDICINE

## 2018-04-07 PROCEDURE — 80061 LIPID PANEL: CPT | Performed by: INTERNAL MEDICINE

## 2018-04-07 PROCEDURE — 82043 UR ALBUMIN QUANTITATIVE: CPT | Performed by: INTERNAL MEDICINE

## 2018-04-07 PROCEDURE — 36415 COLL VENOUS BLD VENIPUNCTURE: CPT | Performed by: INTERNAL MEDICINE

## 2018-06-11 ENCOUNTER — OFFICE VISIT (OUTPATIENT)
Dept: URGENT CARE | Facility: RETAIL CLINIC | Age: 58
End: 2018-06-11
Payer: COMMERCIAL

## 2018-06-11 ENCOUNTER — TELEPHONE (OUTPATIENT)
Dept: FAMILY MEDICINE | Facility: CLINIC | Age: 58
End: 2018-06-11

## 2018-06-11 VITALS
OXYGEN SATURATION: 97 % | HEART RATE: 66 BPM | TEMPERATURE: 98.9 F | SYSTOLIC BLOOD PRESSURE: 129 MMHG | DIASTOLIC BLOOD PRESSURE: 84 MMHG

## 2018-06-11 DIAGNOSIS — S41.112A LACERATION OF ARM, LEFT, INITIAL ENCOUNTER: Primary | ICD-10-CM

## 2018-06-11 DIAGNOSIS — T14.8XXA LOCAL INFECTION OF WOUND: ICD-10-CM

## 2018-06-11 DIAGNOSIS — L08.9 LOCAL INFECTION OF WOUND: ICD-10-CM

## 2018-06-11 PROCEDURE — 99213 OFFICE O/P EST LOW 20 MIN: CPT | Performed by: NURSE PRACTITIONER

## 2018-06-11 RX ORDER — MUPIROCIN 20 MG/G
OINTMENT TOPICAL 3 TIMES DAILY
Qty: 22 G | Refills: 1 | Status: SHIPPED | OUTPATIENT
Start: 2018-06-11 | End: 2018-06-16

## 2018-06-11 NOTE — PROGRESS NOTES
"SUBJECTIVE:   58 year old male sustained laceration of right forearm 5 days ago. Nature of injury: gouged/laceration on a hose clamp. Tetanus vaccination status reviewed: last tetanus booster within 10 years.     OBJECTIVE:   Vitals:    06/11/18 1656   BP: 129/84   Pulse: 66   Temp: 98.9  F (37.2  C)   TempSrc: Oral   SpO2: 97%     Patient appears well, vitals are normal. Laceration 6 cm noted and 0.75 wide at most.  Description: contaminated at 1st /c dirt, later /c oil, skin loss at time (a small gouge & flap lost).  Area is now red, warm & tender locally. Neurovascular and tendon structures are intact.    ASSESSMENT:    Laceration of arm, left, initial encounter  Local infection of wound      PLAN:   Current Outpatient Prescriptions   Medication     amoxicillin-clavulanate (AUGMENTIN) 875-125 MG per tablet     aspirin 81 MG tablet     fluticasone (FLONASE) 50 MCG/ACT nasal spray     glucose blood VI test strips (ACCU-CHEK COMFORT CURVE) strip     Insulin Aspart (NOVOLOG SC)     INSULIN SYRINGE-NEEDLE U-100 MISC 0.25 ML 29 X 1/2\"     LISINOPRIL 5 MG OR TABS     meloxicam (MOBIC) 15 MG tablet     multivitamin, therapeutic with minerals (MULTI-VITAMIN) TABS     mupirocin (BACTROBAN) 2 % ointment     SIMVASTATIN PO     No current facility-administered medications for this visit.        Wound cleansed /a visit. Antibiotic ointment and dressing applied.  Wound care instructions provided.  Observe infection or other problems.  Return to PCP if worsens.    Cuong Young MSN, APRN, Family NP-C  [facility]    "

## 2018-06-11 NOTE — MR AVS SNAPSHOT
"              After Visit Summary   2018    Enmanuel Mckeon    MRN: [id number]           Patient Information     Date Of Birth          1960        Visit Information        Provider Department      2018 4:50 PM Cuong Young APRN [facility]        Today's Diagnoses     Laceration of arm, left, initial encounter    -  1    Local infection of wound           Follow-ups after your visit        Who to contact     You can reach your care team any time of the day by calling [phone].  Notification of test results:  If you have an abnormal lab result, we will notify you by phone as soon as possible.         Additional Information About Your Visit        MyChart Information     [employer] lets you send messages to your doctor, view your test results, renew your prescriptions, schedule appointments and more. To sign up, go to [email or web address] . Click on \"Log in\" on the left side of the screen, which will take you to the Welcome page. Then click on \"Sign up Now\" on the right side of the page.     You will be asked to enter the access code listed below, as well as some personal information. Please follow the directions to create your username and password.     Your access code is: [email or web address]  Expires: 2018  5:26 PM     Your access code will  in 90 days. If you need help or a new code, please call your [facility] clinic or [phone].        Care EveryWhere ID     This is your [facility] EveryWhere ID. This could be used by other organizations to access your [facility] medical records  [id number]        Your Vitals Were     Pulse Temperature Pulse Oximetry             66 98.9  F (37.2  C) (Oral) 97%          Blood Pressure from Last 3 Encounters:   18 129/84   18 141/89   17 114/80    Weight from Last 3 Encounters:   17 193 lb (87.5 kg)   17 193 lb 9.6 oz (87.8 kg)   16 196 lb (88.9 kg)              Today, you had the following     " No orders found for display         Today's Medication Changes          These changes are accurate as of 6/11/18  5:26 PM.  If you have any questions, ask your nurse or doctor.               Start taking these medicines.        Dose/Directions    amoxicillin-clavulanate 875-125 MG per tablet   Commonly known as:  AUGMENTIN   Used for:  Laceration of arm, left, initial encounter, Local infection of wound   Started by:  Cuong Young APRN CNP        Dose:  1 tablet   Take 1 tablet by mouth 2 times daily for 14 days   Quantity:  28 tablet   Refills:  0       mupirocin 2 % ointment   Commonly known as:  BACTROBAN   Used for:  Local infection of wound, Laceration of arm, left, initial encounter   Started by:  Cuong Young APRN CNP        Apply topically 3 times daily for 5 days   Quantity:  22 g   Refills:  1            Where to get your medicines      These medications were sent to [address]  [address], [facility] [address]     Phone:  [phone]     amoxicillin-clavulanate 875-125 MG per tablet    mupirocin 2 % ointment                Primary Care Provider Fax #    Physician No Ref-Primary [phone]       No address on file        Equal Access to Services     [facility] JOSIE : Hadii lea blunt hadasho Sopadillaali, waaxda luqadaha, qaybta kaalmada adeegyada, jethro zhou. So [facility] [phone].    ATENCIÓN: Si habla español, tiene a guidry disposición servicios gratuitos de asistencia lingüística. Castro al [phone].    We comply with applicable federal civil rights laws and [place] laws. We do not discriminate on the basis of race, color, national origin, age, disability, sex, sexual orientation, or gender identity.            Thank you!     Thank you for choosing [facility]  for your care. Our goal is always to provide you with excellent care. Hearing back from our patients is one way we can continue to improve our  "services. Please take a few minutes to complete the written survey that you may receive in the mail after your visit with us. Thank you!             Your Updated Medication List - Protect others around you: Learn how to safely use, store and throw away your medicines at www.disposemymeds.org.          This list is accurate as of 6/11/18  5:26 PM.  Always use your most recent med list.                   Brand Name Dispense Instructions for use Diagnosis    ACCU-CHEK COMFORT CURVE test strip   Generic drug:  blood glucose monitoring     100 each    5 times daily    Type I (juvenile type) diabetes mellitus without mention of complication, not stated as uncontrolled       amoxicillin-clavulanate 875-125 MG per tablet    AUGMENTIN    28 tablet    Take 1 tablet by mouth 2 times daily for 14 days    Laceration of arm, left, initial encounter, Local infection of wound       aspirin 81 MG tablet     100 tablet    Take 2 tablets (162 mg) by mouth daily        fluticasone 50 MCG/ACT spray    FLONASE    16 g    Spray 1-2 sprays into both nostrils daily    Acute recurrent maxillary sinusitis       INSULIN SYRINGE-NEEDLE U-100 29G X 1/2\" 0.25 ML Misc      insulin pump        lisinopril 5 MG tablet    PRINIVIL/ZESTRIL    30    1 1/2 TABLET DAILY        meloxicam 15 MG tablet    MOBIC    30 tablet    Take 1 tablet (15 mg) by mouth daily    Effusion of right knee, Chondrocalcinosis of knee, right       Multi-vitamin Tabs tablet     100 tablet    Take 1 tablet by mouth daily        mupirocin 2 % ointment    BACTROBAN    22 g    Apply topically 3 times daily for 5 days    Local infection of wound, Laceration of arm, left, initial encounter       NOVOLOG SC           SIMVASTATIN PO             "

## 2018-06-11 NOTE — TELEPHONE ENCOUNTER
Enmanuel Mckeon is a 58 year old male who calls with possible infection of cut.    NURSING ASSESSMENT:  Description:  Patient is calling to report he cut himself on his truck last week Wed, 6/6/18.  He states he washed it out and had been keeping it covered with abx and it had scabbed up and did not seem to be having any problems with it.  He is calling to report that this morning he spilled oil on the wound and now the area is red, and has some pus coming out along with heat to the area and pain.  He would like to be seen to see if it is infected.  He states he did wash the area to remove the oil, and did cover it with abx again, but it is not helping.  [facility]  Patient states he is going to go to [facility] to have his arm assessed as he does not want to wait until tomorrow.  Onset/duration:  5 days  Precip. factors:  Cut on arm  Associated symptoms:  See above  Improves/worsens symptoms:  Worsening today    Last exam/Treatment:  9/21/17  Allergies:   Allergies   Allergen Reactions     No Known Drug Allergies        NURSING PLAN: Nursing advice to patient none    RECOMMENDED DISPOSITION:  See in 4 hours, another person to drive   Will comply with recommendation: Yes  If further questions/concerns or if symptoms do not improve, worsen or new symptoms develop, call your PCP or [facility] as soon as possible.      Guideline used: laceration  Telephone Triage Protocols for Nurses, Fifth Edition, Cici Mccartney RN

## 2018-10-08 ENCOUNTER — OFFICE VISIT (OUTPATIENT)
Dept: FAMILY MEDICINE | Facility: CLINIC | Age: 58
End: 2018-10-08
Payer: COMMERCIAL

## 2018-10-08 VITALS
OXYGEN SATURATION: 98 % | SYSTOLIC BLOOD PRESSURE: 124 MMHG | HEIGHT: 71 IN | RESPIRATION RATE: 16 BRPM | TEMPERATURE: 97.7 F | WEIGHT: 203 LBS | BODY MASS INDEX: 28.42 KG/M2 | HEART RATE: 84 BPM | DIASTOLIC BLOOD PRESSURE: 62 MMHG

## 2018-10-08 DIAGNOSIS — R19.7 DIARRHEA, UNSPECIFIED TYPE: ICD-10-CM

## 2018-10-08 DIAGNOSIS — Z23 NEED FOR PROPHYLACTIC VACCINATION AND INOCULATION AGAINST INFLUENZA: Primary | ICD-10-CM

## 2018-10-08 PROCEDURE — 90682 RIV4 VACC RECOMBINANT DNA IM: CPT | Performed by: OBSTETRICS & GYNECOLOGY

## 2018-10-08 PROCEDURE — 99214 OFFICE O/P EST MOD 30 MIN: CPT | Mod: 25 | Performed by: OBSTETRICS & GYNECOLOGY

## 2018-10-08 PROCEDURE — 90471 IMMUNIZATION ADMIN: CPT | Performed by: OBSTETRICS & GYNECOLOGY

## 2018-10-08 ASSESSMENT — PAIN SCALES - GENERAL: PAINLEVEL: NO PAIN (0)

## 2018-10-08 NOTE — PROGRESS NOTES
Injectable Influenza Immunization Documentation    1.  Is the person to be vaccinated sick today?   No    2. Does the person to be vaccinated have an allergy to a component   of the vaccine?   No  Egg Allergy Algorithm Link    3. Has the person to be vaccinated ever had a serious reaction   to influenza vaccine in the past?   No    4. Has the person to be vaccinated ever had Guillain-Barré syndrome?   No    Form completed by Serafin Santiago MA  Prior to injection verified patient identity using patient's name and date of birth.  Per orders of Dr. Luong, injection of FluBlok given by Serafin Santiago. Patient instructed to remain in clinic for 20 minutes afterwards, and to report any adverse reaction to me immediately. Serafin Santiago MA

## 2018-10-08 NOTE — PROGRESS NOTES
"Subjective: He has type 1 diabetes.  Followed by an endocrinologist Dr. De Paz.  In September 2017 he tested positive for Clostridium difficile after having some loose stools.  He was treated with an antibiotic for a month and did not have a recheck after that.  He was asymptomatic from October 2017 until July 2018.  Then he began having loose stools again for the past 3 months.  Loose but not watery.  Twice daily.  Also passing flatus.  Thinks he may have it back again.      The past medical history, social history, past surgical history and family history as shown below have been reviewed by me today.  Past Medical History:   Diagnosis Date     Diabetic eye exam (H) 08/12/11     Diabetic eye exam (H) 08/24/12, 2/14/14     Diabetic eye exam (H) 1/23/15     Essential hypertension, benign      Type I (juvenile type) diabetes mellitus without mention of complication, not stated as uncontrolled         Allergies   Allergen Reactions     No Known Drug Allergies      Current Outpatient Prescriptions   Medication Sig Dispense Refill     aspirin 81 MG tablet Take 2 tablets (162 mg) by mouth daily 100 tablet 3     fluticasone (FLONASE) 50 MCG/ACT nasal spray Spray 1-2 sprays into both nostrils daily 16 g 11     glucose blood VI test strips (ACCU-CHEK COMFORT CURVE) strip 5 times daily 100 each 0     Insulin Aspart (NOVOLOG SC)        INSULIN SYRINGE-NEEDLE U-100 MISC 0.25 ML 29 X 1/2\"  insulin pump       LISINOPRIL 5 MG OR TABS 1 1/2 TABLET DAILY 30 0     meloxicam (MOBIC) 15 MG tablet Take 1 tablet (15 mg) by mouth daily 30 tablet 1     multivitamin, therapeutic with minerals (MULTI-VITAMIN) TABS Take 1 tablet by mouth daily 100 tablet 3     SIMVASTATIN PO        Past Surgical History:   Procedure Laterality Date     HC COLONOSCOPY W/WO BRUSH/WASH  01/27/09     Social History     Social History     Marital status:      Spouse name: N/A     Number of children: N/A     Years of education: N/A     Social History Main " "Topics     Smoking status: Former Smoker     Smokeless tobacco: Former User      Comment: quite 20 years ago     Alcohol use 0.0 oz/week     0 Standard drinks or equivalent per week      Comment: occasional     Drug use: No     Sexual activity: Yes     Partners: Female     Other Topics Concern     Parent/Sibling W/ Cabg, Mi Or Angioplasty Before 65f 55m? No     Social History Narrative     Family History   Problem Relation Age of Onset     Alcohol/Drug Sister      Diabetes Father      HEART DISEASE Father      4 way bypass     Neurologic Disorder Mother      anurism       ROS: A 12 point review of systems was done. Except for what is listed above in the HPI, the systems review is negative .      Objective: Vital signs: Blood pressure 124/62, pulse 84, temperature 97.7  F (36.5  C), temperature source Temporal, resp. rate 16, height 5' 11\" (1.803 m), weight 203 lb (92.1 kg), SpO2 98 %.    HEENT:    Sclerae and conjunctiva are normal.   Ear canals and TMs look normal.  Nasal mucosa is pink  - no polyps or masses seen.  sinuses are non tender to palpation.  Throat is unremarkable . Mucous membranes are moist.   Neck is supple, mobile, no adenopathy or masses palpable. The thyroid feels normal.   Normal range of motion noted.  Chest is clear to auscultation.  No wheezes, rales or rhonchi heard.  Cardiac exam is normal with s1, s2, no murmurs or adventitious sounds.Normal rate and rhythm is heard.   Abdomen is soft,  nondistended, No masses felt.No HSM. No guarding or rigidity or rebound   noted. Palpation reveals  no    tenderness   Normal bowel sounds heard.           Assessment/Plan:    1.  58-year-old male with type 1 diabetes mellitus who has loose frequent bowel movements and flatus and a previous history of Clostridium difficile pseudomembranous colitis.  I will check stool cultures for ova and parasites and bacteria and viruses as well as Clostridium difficile toxin.  We will call him with results.    2.  He is " advised to eat yogurt.    3.  He is following up with Dr. De Paz for maintenance of his diabetes exams.    4.  I advised a flu shot today.  This was given.    ALBANIA Luong MD

## 2018-10-08 NOTE — MR AVS SNAPSHOT
After Visit Summary   10/8/2018    Enmanuel Mckeon    MRN: [id number]           Patient Information     Date Of Birth          1960        Visit Information        Provider Department      10/8/2018 12:00 PM Pascual Luong MD [facility]        Today's Diagnoses     Need for prophylactic vaccination and inoculation against influenza    -  1    Diarrhea, unspecified type           Follow-ups after your visit        Follow-up notes from your care team     Return in about 1 week (around 10/15/2018) for Routine Visit.      Future tests that were ordered for you today     Open Future Orders        Priority Expected Expires Ordered    Enteric Bacteria and Virus Panel by CARMEN Stool Routine  10/8/2019 10/8/2018    Ova and Parasite Exam Routine Routine  10/8/2019 10/8/2018    Clostridium difficile Toxin B PCR Routine  11/7/2018 10/8/2018            Who to contact     If you have questions or need follow up information about today's clinic visit or your schedule please contact [facility] directly at [phone].  Normal or non-critical lab and imaging results will be communicated to you by MyChart, letter or phone within 4 business days after the clinic has received the results. If you do not hear from us within 7 days, please contact the clinic through [employer] or phone. If you have a critical or abnormal lab result, we will notify you by phone as soon as possible.  Submit refill requests through [employer] or call your pharmacy and they will forward the refill request to us. Please allow 3 business days for your refill to be completed.          Additional Information About Your Visit        Care EveryWhere ID     This is your Care EveryWhere ID. This could be used by other organizations to access your [facility] medical records  [id number]        Your Vitals Were     Pulse Temperature Respirations Height Pulse Oximetry BMI (Body Mass Index)    84 97.7  F (36.5  C)  "(Temporal) 16 5' 11\" (1.803 m) 98% 28.31 kg/m2       Blood Pressure from Last 3 Encounters:   10/08/18 124/62   06/11/18 129/84   02/02/18 141/89    Weight from Last 3 Encounters:   10/08/18 203 lb (92.1 kg)   09/25/17 193 lb (87.5 kg)   09/21/17 193 lb 9.6 oz (87.8 kg)              We Performed the Following     FLU VACCINE, (RIV4) RECOMBINANT HA  , IM (FluBlok, egg free) [77715]- >18 YRS (FMG recommended  50-64 YRS)     Vaccine Administration, Initial [81592]        Primary Care Provider Fax #    Physician No Ref-Primary [phone]       No address on file        Equal Access to Services     DEVI GALINDO : Shayne Persaud, wadelio luqadaha, qaybta kaalmada marilu, jethro house . So [facility] [phone].    ATENCIÓN: Si habla español, tiene a guidry disposición servicios gratuitos de asistencia lingüística. Llame al [phone].    We comply with applicable federal civil rights laws and [place] laws. We do not discriminate on the basis of race, color, national origin, age, disability, sex, sexual orientation, or gender identity.            Thank you!     Thank you for choosing [facility]  for your care. Our goal is always to provide you with excellent care. Hearing back from our patients is one way we can continue to improve our services. Please take a few minutes to complete the written survey that you may receive in the mail after your visit with us. Thank you!             Your Updated Medication List - Protect others around you: Learn how to safely use, store and throw away your medicines at www.disposemymeds.org.          This list is accurate as of 10/8/18 12:53 PM.  Always use your most recent med list.                   Brand Name Dispense Instructions for use Diagnosis    ACCU-CHEK COMFORT CURVE test strip   Generic drug:  blood glucose monitoring     100 each    5 times daily    Type I (juvenile type) diabetes mellitus without mention of " "complication, not stated as uncontrolled       aspirin 81 MG tablet     100 tablet    Take 2 tablets (162 mg) by mouth daily        fluticasone 50 MCG/ACT spray    FLONASE    16 g    Spray 1-2 sprays into both nostrils daily    Acute recurrent maxillary sinusitis       INSULIN SYRINGE-NEEDLE U-100 29G X 1/2\" 0.25 ML Misc      insulin pump        lisinopril 5 MG tablet    PRINIVIL/ZESTRIL    30    1 1/2 TABLET DAILY        meloxicam 15 MG tablet    MOBIC    30 tablet    Take 1 tablet (15 mg) by mouth daily    Effusion of right knee, Chondrocalcinosis of knee, right       Multi-vitamin Tabs tablet     100 tablet    Take 1 tablet by mouth daily        NOVOLOG SC           SIMVASTATIN PO             "

## 2018-10-09 DIAGNOSIS — R19.7 DIARRHEA, UNSPECIFIED TYPE: ICD-10-CM

## 2018-10-09 LAB
C DIFF TOX B STL QL: NEGATIVE
SPECIMEN SOURCE: NORMAL

## 2018-10-09 PROCEDURE — 87177 OVA AND PARASITES SMEARS: CPT | Performed by: OBSTETRICS & GYNECOLOGY

## 2018-10-09 PROCEDURE — 87209 SMEAR COMPLEX STAIN: CPT | Performed by: OBSTETRICS & GYNECOLOGY

## 2018-10-09 PROCEDURE — 87506 IADNA-DNA/RNA PROBE TQ 6-11: CPT | Performed by: OBSTETRICS & GYNECOLOGY

## 2018-10-09 PROCEDURE — 87493 C DIFF AMPLIFIED PROBE: CPT | Performed by: OBSTETRICS & GYNECOLOGY

## 2018-10-10 LAB
C COLI+JEJUNI+LARI FUSA STL QL NAA+PROBE: NOT DETECTED
EC STX1 GENE STL QL NAA+PROBE: NOT DETECTED
EC STX2 GENE STL QL NAA+PROBE: NOT DETECTED
ENTERIC PATHOGEN COMMENT: NORMAL
NOROV GI+II ORF1-ORF2 JNC STL QL NAA+PR: NOT DETECTED
O+P STL MICRO: NORMAL
O+P STL MICRO: NORMAL
RVA NSP5 STL QL NAA+PROBE: NOT DETECTED
SALMONELLA SP RPOD STL QL NAA+PROBE: NOT DETECTED
SHIGELLA SP+EIEC IPAH STL QL NAA+PROBE: NOT DETECTED
SPECIMEN SOURCE: NORMAL
V CHOL+PARA RFBL+TRKH+TNAA STL QL NAA+PR: NOT DETECTED
Y ENTERO RECN STL QL NAA+PROBE: NOT DETECTED

## 2018-10-10 NOTE — PROGRESS NOTES
Shayy Please inform Enmanuel/ or caretaker  that this result(s) is/are normal.  The clostridium difficile test is negative- the other stool cultures are pending- if they come back normal, we will set up a colonoscopy.Thanks. ALBANIA Luong MD

## 2018-10-11 ENCOUNTER — TELEPHONE (OUTPATIENT)
Dept: FAMILY MEDICINE | Facility: CLINIC | Age: 58
End: 2018-10-11

## 2018-10-11 DIAGNOSIS — R19.7 DIARRHEA, UNSPECIFIED TYPE: ICD-10-CM

## 2018-10-11 DIAGNOSIS — Z86.19 HX OF CLOSTRIDIUM DIFFICILE INFECTION: Primary | ICD-10-CM

## 2018-10-11 NOTE — PROGRESS NOTES
Shayy Please inform Enmanuel/ or caretaker  that this result(s) is/are normal.  Thanks. ALBANIA Luong MD

## 2018-10-11 NOTE — TELEPHONE ENCOUNTER
Reason for Call: Request for an order or referral:    Order or referral being requested: colonoscopy     Date needed: at your convenience    Has the patient been seen by the PCP for this problem? YES    Additional comments: Pt was informed that the stool tests were negative. Please place order and call pt to set up colonoscopy.     Phone number Patient can be reached at:  Cell number on file:    Telephone Information:   Mobile [phone]       Best Time:  Any     Can we leave a detailed message on this number?  YES    Call taken on 10/11/2018 at 12:57 PM by Haley Lowe

## 2018-10-11 NOTE — TELEPHONE ENCOUNTER
Order placed. Patient should expect call from schedulers in 2-3 days for scheduling and instructions    Shayy Hopkins CMA

## 2018-10-15 ENCOUNTER — TELEPHONE (OUTPATIENT)
Dept: FAMILY MEDICINE | Facility: CLINIC | Age: 58
End: 2018-10-15

## 2018-10-15 NOTE — LETTER
[address] [address] [phone]        October 18, 2018    Enmanuel Mckeon  [address]  [facility] [address]

## 2018-10-15 NOTE — TELEPHONE ENCOUNTER
Left message for patient to return call to schedule EGD/colonoscopy. If Therese or Stephy are not available, please transfer to same day surgery

## 2018-10-15 NOTE — LETTER

## 2018-10-18 NOTE — TELEPHONE ENCOUNTER
Date of colonoscopy/EGD: 11/19/18  Surgeon: Dr. Lin  Prep:Miralax  Packet:Colonoscopy/EGD instructions mailed to patient's home address.   Date: 10/18/2018      Surgery Scheduler

## 2018-11-09 NOTE — PROGRESS NOTES
SUBJECTIVE:   Enmanuel Mckeon is a 58 year old male who presents to clinic today for the following health issues:      HPI  Joint Pain    Onset: last weekend     Description:   Location: Right Knee   Character: numbness and pulling on lower outside of knee, constant aching     Intensity: moderate    Progression of Symptoms: better    Accompanying Signs & Symptoms:  Other symptoms: numbness and swelling    History:   Previous similar pain: no       Precipitating factors:   Trauma or overuse: YES- hunting, likely twisted on uneven ground (possible russo hole)    Alleviating factors:  Improved by: rest/inactivity and Ibuprofen    Therapies Tried and outcome: rest and ibuprofen     States stepped into a hole when walking in woods twisting right knee. He denies hearing a pop. States he started to have inflammation almost immediately after injury and has been resting. It has improved considerably. But  and sore.    Problem list and histories reviewed & adjusted, as indicated.  Additional history: as documented      Patient Active Problem List   Diagnosis     TYPE 1 DIABETES, HBA1C GOAL < 7%     CARDIOVASCULAR SCREENING; LDL GOAL LESS THAN 100     Hypertension goal BP (blood pressure) < 140/90     C. difficile colitis     Past Surgical History:   Procedure Laterality Date     HC COLONOSCOPY W/WO BRUSH/WASH  01/27/09       Social History   Substance Use Topics     Smoking status: Former Smoker     Smokeless tobacco: Former User      Comment: quite 20 years ago     Alcohol use 0.0 oz/week     0 Standard drinks or equivalent per week      Comment: occasional     Family History   Problem Relation Age of Onset     Alcohol/Drug Sister      Diabetes Father      HEART DISEASE Father      4 way bypass     Neurologic Disorder Mother      anurism         Current Outpatient Prescriptions   Medication Sig Dispense Refill     aspirin 81 MG tablet Take 2 tablets (162 mg) by mouth daily 100 tablet 3     fluticasone (FLONASE)  "50 MCG/ACT nasal spray Spray 1-2 sprays into both nostrils daily 16 g 11     glucose blood VI test strips (ACCU-CHEK COMFORT CURVE) strip 5 times daily 100 each 0     Insulin Aspart (NOVOLOG SC)        INSULIN SYRINGE-NEEDLE U-100 MISC 0.25 ML 29 X 1/2\"  insulin pump       LISINOPRIL 5 MG OR TABS 1 1/2 TABLET DAILY 30 0     meloxicam (MOBIC) 15 MG tablet Take 1 tablet (15 mg) by mouth daily 30 tablet 1     multivitamin, therapeutic with minerals (MULTI-VITAMIN) TABS Take 1 tablet by mouth daily 100 tablet 3     SIMVASTATIN PO        Allergies   Allergen Reactions     No Known Drug Allergies      BP Readings from Last 3 Encounters:   11/12/18 122/74   10/08/18 124/62   06/11/18 129/84    Wt Readings from Last 3 Encounters:   11/12/18 203 lb (92.1 kg)   10/08/18 203 lb (92.1 kg)   09/25/17 193 lb (87.5 kg)                    ROS:  Constitutional, HEENT, cardiovascular, pulmonary, gi and gu systems are negative, except as otherwise noted.    OBJECTIVE:     /74  Pulse 74  Temp 97.9  F (36.6  C) (Temporal)  Resp 16  Ht 5' 11\" (1.803 m)  Wt 203 lb (92.1 kg)  SpO2 97%  BMI 28.31 kg/m2  Body mass index is 28.31 kg/(m^2).  GENERAL: healthy, alert and no distress  MS:Right Knee Exam: Inspection: small effusion, No quad atrophy  Tender: lateral patellar facet, lateral joint line  Active Range of Motion: full flexion, pain with flexion, full extension, no pain with extension, Patellofemoral crepitus with extension  Strength: normal  Special tests: normal Valgus stress test, normal Varus, negative Lachman's test, negative posterior drawer, negative Ihsan's     ASSESSMENT/PLAN:     1. Tendinitis of right knee  Home care instructions were reviewed with the patient. The risks, benefits and treatment options of prescribed medications or other treatments have been discussed with the patient. The patient verbalized their understanding and should call or follow up if no improvement or if they develop further " problems.    Patient Instructions   Please continue to use Ibuprofen 600 mg every 6 hours as needed for inflammation may take this with tylenol 1000 mg 3 times daily as needed for discomfort. Take both with food. Rest with elevation to heart level or higher every 2-3 hours for 15 minutes. Compression with ace wrap when up moving around.     If continue to have issues after 10 days recommend physical therapy. If pain > 6 weeks please return to clinic for further evaluaiton.     Thank you  Betsy Weaver [facility]

## 2018-11-12 ENCOUNTER — OFFICE VISIT (OUTPATIENT)
Dept: FAMILY MEDICINE | Facility: OTHER | Age: 58
End: 2018-11-12
Payer: COMMERCIAL

## 2018-11-12 VITALS
TEMPERATURE: 97.9 F | SYSTOLIC BLOOD PRESSURE: 122 MMHG | OXYGEN SATURATION: 97 % | HEART RATE: 74 BPM | WEIGHT: 203 LBS | RESPIRATION RATE: 16 BRPM | DIASTOLIC BLOOD PRESSURE: 74 MMHG | BODY MASS INDEX: 28.42 KG/M2 | HEIGHT: 71 IN

## 2018-11-12 DIAGNOSIS — M76.891 TENDINITIS OF RIGHT KNEE: Primary | ICD-10-CM

## 2018-11-12 PROCEDURE — 99213 OFFICE O/P EST LOW 20 MIN: CPT | Performed by: NURSE PRACTITIONER

## 2018-11-12 NOTE — PATIENT INSTRUCTIONS
Please continue to use Ibuprofen 600 mg every 6 hours as needed for inflammation may take this with tylenol 1000 mg 3 times daily as needed for discomfort. Take both with food. Rest with elevation to heart level or higher every 2-3 hours for 15 minutes. Compression with ace wrap when up moving around.     If continue to have issues after 10 days recommend physical therapy. If pain > 6 weeks please return to clinic for further evaluaiton.     Thank you  Betsy Weaver CNP

## 2018-11-12 NOTE — MR AVS SNAPSHOT
After Visit Summary   11/12/2018    Enmanuel Mckeon    MRN: [id number]           Patient Information     Date Of Birth          1960        Visit Information        Provider Department      11/12/2018 9:00 AM Betsy Weaver APRN CNP [facility]        Today's Diagnoses     Need for hepatitis C screening test        Screening for HIV (human immunodeficiency virus)        Screening for diabetic retinopathy        Screening for diabetic peripheral neuropathy          Care Instructions    Please continue to use Ibuprofen 600 mg every 6 hours as needed for inflammation may take this with tylenol 1000 mg 3 times daily as needed for discomfort. Take both with food. Rest with elevation to heart level or higher every 2-3 hours for 15 minutes. Compression with ace wrap when up moving around.     If continue to have issues after 10 days recommend physical therapy. If pain > 6 weeks please return to clinic for further evaluaiton.     Thank you  Betsy Weaver CNP            Follow-ups after your visit        Your next 10 appointments already scheduled     Nov 19, 2018   Procedure with Enmanuel Gerber DO   [facility] ([facility])    [address] [place] [address] [phone]              Who to contact     If you have questions or need follow up information about today's clinic visit or your schedule please contact [facility] directly at [phone].  Normal or non-critical lab and imaging results will be communicated to you by MyChart, letter or phone within 4 business days after the clinic has received the results. If you do not hear from us within 7 days, please contact the clinic through MyChart or phone. If you have a critical or abnormal lab result, we will notify you by phone as soon as possible.  Submit refill requests through [facility] or call your pharmacy and they will forward the refill  "request to us. Please allow 3 business days for your refill to be completed.          Additional Information About Your Visit        Care EveryWhere ID     This is your Care EveryWhere ID. This could be used by other organizations to access your [facility] medical records  [id number]        Your Vitals Were     Pulse Temperature Respirations Height Pulse Oximetry BMI (Body Mass Index)    74 97.9  F (36.6  C) (Temporal) 16 5' 11\" (1.803 m) 97% 28.31 kg/m2       Blood Pressure from Last 3 Encounters:   11/12/18 122/74   10/08/18 124/62   06/11/18 129/84    Weight from Last 3 Encounters:   11/12/18 203 lb (92.1 kg)   10/08/18 203 lb (92.1 kg)   09/25/17 193 lb (87.5 kg)              Today, you had the following     No orders found for display       Primary Care Provider Fax #    Physician No Ref-Primary [phone]       No address on file        Equal Access to Services     DEVI GALINDO : Hadii lea trevinoo Sojuan luis, waaxda luqadaha, qaybta kaalmada adealexeyyaeffie, jethro house . So [facility] [phone].    ATENCIÓN: Si habla español, tiene a guidry disposición servicios gratuitos de asistencia lingüística. Llame al [phone].    We comply with applicable federal civil rights laws and [place] laws. We do not discriminate on the basis of race, color, national origin, age, disability, sex, sexual orientation, or gender identity.            Thank you!     Thank you for choosing [facility]  for your care. Our goal is always to provide you with excellent care. Hearing back from our patients is one way we can continue to improve our services. Please take a few minutes to complete the written survey that you may receive in the mail after your visit with us. Thank you!             Your Updated Medication List - Protect others around you: Learn how to safely use, store and throw away your medicines at www.disposemymeds.org.          This list is accurate as of 11/12/18  9:42 AM.  Always " "use your most recent med list.                   Brand Name Dispense Instructions for use Diagnosis    ACCU-CHEK COMFORT CURVE test strip   Generic drug:  blood glucose monitoring     100 each    5 times daily    Type I (juvenile type) diabetes mellitus without mention of complication, not stated as uncontrolled       aspirin 81 MG tablet     100 tablet    Take 2 tablets (162 mg) by mouth daily        fluticasone 50 MCG/ACT spray    FLONASE    16 g    Spray 1-2 sprays into both nostrils daily    Acute recurrent maxillary sinusitis       INSULIN SYRINGE-NEEDLE U-100 29G X 1/2\" 0.25 ML Misc      insulin pump        lisinopril 5 MG tablet    PRINIVIL/ZESTRIL    30    1 1/2 TABLET DAILY        meloxicam 15 MG tablet    MOBIC    30 tablet    Take 1 tablet (15 mg) by mouth daily    Effusion of right knee, Chondrocalcinosis of knee, right       Multi-vitamin Tabs tablet     100 tablet    Take 1 tablet by mouth daily        NOVOLOG SC           SIMVASTATIN PO             "

## 2018-11-19 ENCOUNTER — HOSPITAL ENCOUNTER (OUTPATIENT)
Facility: CLINIC | Age: 58
Discharge: HOME | End: 2018-11-19
Attending: SURGERY | Admitting: SURGERY
Payer: COMMERCIAL

## 2018-11-19 ENCOUNTER — SURGERY (OUTPATIENT)
Age: 58
End: 2018-11-19

## 2018-11-19 VITALS
RESPIRATION RATE: 16 BRPM | DIASTOLIC BLOOD PRESSURE: 92 MMHG | OXYGEN SATURATION: 98 % | TEMPERATURE: 98.1 F | HEART RATE: 70 BPM | SYSTOLIC BLOOD PRESSURE: 129 MMHG

## 2018-11-19 LAB
COLONOSCOPY: NORMAL
GLUCOSE BLDC GLUCOMTR-MCNC: 157 MG/DL (ref 70–99)

## 2018-11-19 PROCEDURE — 25000128 H RX IP 250 OP 636: Performed by: SURGERY

## 2018-11-19 PROCEDURE — 45380 COLONOSCOPY AND BIOPSY: CPT | Performed by: SURGERY

## 2018-11-19 PROCEDURE — 45378 DIAGNOSTIC COLONOSCOPY: CPT | Performed by: SURGERY

## 2018-11-19 PROCEDURE — 82962 GLUCOSE BLOOD TEST: CPT

## 2018-11-19 PROCEDURE — 99152 MOD SED SAME PHYS/QHP 5/>YRS: CPT | Mod: 59 | Performed by: SURGERY

## 2018-11-19 PROCEDURE — G0500 MOD SEDAT ENDO SERVICE >5YRS: HCPCS | Performed by: SURGERY

## 2018-11-19 PROCEDURE — 40000296 ZZH STATISTIC ENDO RECOVERY CLASS 1:2 FIRST HOUR: Performed by: SURGERY

## 2018-11-19 PROCEDURE — 88305 TISSUE EXAM BY PATHOLOGIST: CPT | Mod: 26 | Performed by: SURGERY

## 2018-11-19 PROCEDURE — 88305 TISSUE EXAM BY PATHOLOGIST: CPT | Performed by: SURGERY

## 2018-11-19 RX ORDER — LIDOCAINE 40 MG/G
CREAM TOPICAL
Status: DISCONTINUED | OUTPATIENT
Start: 2018-11-19 | End: 2018-11-19 | Stop reason: HOSPADM

## 2018-11-19 RX ORDER — FENTANYL CITRATE 50 UG/ML
INJECTION, SOLUTION INTRAMUSCULAR; INTRAVENOUS PRN
Status: DISCONTINUED | OUTPATIENT
Start: 2018-11-19 | End: 2018-11-19 | Stop reason: HOSPADM

## 2018-11-19 RX ORDER — ONDANSETRON 2 MG/ML
4 INJECTION INTRAMUSCULAR; INTRAVENOUS
Status: DISCONTINUED | OUTPATIENT
Start: 2018-11-19 | End: 2018-11-19 | Stop reason: HOSPADM

## 2018-11-19 RX ADMIN — MIDAZOLAM 2 MG: 1 INJECTION INTRAMUSCULAR; INTRAVENOUS at 12:00

## 2018-11-19 RX ADMIN — FENTANYL CITRATE 100 MCG: 50 INJECTION, SOLUTION INTRAMUSCULAR; INTRAVENOUS at 12:00

## 2018-11-19 RX ADMIN — MIDAZOLAM 1 MG: 1 INJECTION INTRAMUSCULAR; INTRAVENOUS at 12:02

## 2018-11-19 RX ADMIN — FENTANYL CITRATE 25 MCG: 50 INJECTION, SOLUTION INTRAMUSCULAR; INTRAVENOUS at 12:02

## 2018-11-19 NOTE — LETTER
[facility]           [address]           [place] [address]           Tel [phone]  Enmanuel Mckeon  [address] [facility] [address]      November 27, 2018    Dear Enmanuel,  This letter is to inform you of the results of your pathology report on your colonoscopy.  If you have questions please feel free to call my assistant  At [phone] .    Your pathology report was:  A. Colon biopsy, random:   - No diagnostic abnormalities identified, benign colonic mucosa without   inflammation or atypia.     B. Terminal ileum biopsy:   - Normal small bowel mucosa, no diagnostic abnormalities identified.     C. Colon polyp, transverse:   - Tubular adenoma.   - Negative for high grade dysplasia or malignancy.     Showed an Adenomatous polyp. This is a benign (not cancerous) growth; however these can become cancer over time. This polyp is usually removed completely at the time of the biopsy. Because it is an Adenomatous polyp you do have a slight higher risk for colon cancer. This is why you will need a repeat colonoscopy in approximately 5 years.  There is no structural reason for your diarrhea based of the pathology.  Please be sure to follow up with your primary care physician if symptoms continue.   If you do have further questions please don t hesitate to call my assistant at  .  We do not have someone answering the phone all the time at my assistants number so if leave a message may take a day or so to get back to you.  So if more urgent then call the below number.    To make an appointment call [phone]: .   Sincerely,   Enmanuel Gerber D.O.

## 2018-11-19 NOTE — IP AVS SNAPSHOT
[facility]    [address] [address]    Phone:  [phone]                                       After Visit Summary   11/19/2018    Enmanuel Mckeon    MRN: [id number]           After Visit Summary Signature Page     I have received my discharge instructions, and my questions have been answered. I have discussed any challenges I see with this plan with the nurse or doctor.    ..........................................................................................................................................  Patient/Patient Representative Signature      ..........................................................................................................................................  Patient Representative Print Name and Relationship to Patient    ..................................................               ................................................  Date                                   Time    ..........................................................................................................................................  Reviewed by Signature/Title    ...................................................              ..............................................  Date                                               Time          22EPIC Rev 08/18

## 2018-11-19 NOTE — IP AVS SNAPSHOT
MRN:[id number]                      After Visit Summary   11/19/2018    Enmanuel Mckeon    MRN: [id number]           Thank you!     Thank you for choosing [facility] for your care. Our goal is always to provide you with excellent care. Hearing back from our patients is one way we can continue to improve our services. Please take a few minutes to complete the written survey that you may receive in the mail after you visit with us. Thank you!        Patient Information     Date Of Birth          1960        About your hospital stay     You were admitted on:  November 19, 2018 You last received care in the:  [facility]    You were discharged on:  November 19, 2018       Who to Call     For medical emergencies, please call 911.  For non-urgent questions about your medical care, please call your primary care provider or clinic, None  For questions related to your surgery, please call your surgery clinic        Attending Provider     Provider Enmanuel Suárez,  Surgery       Primary Care Provider Fax #    Physician No Ref-Primary [phone]      Further instructions from your care team         [facility]    Home Care Following Endoscopy          Activity:    You have just undergone an endoscopic procedure usually performed with conscious sedation.  Do not work or operate machinery (including a car) for at least 12 hours.      I encourage you to walk and attempt to pass this air as soon as possible.    Diet:    Return to the diet you were on before your procedure but eat lightly for the first 12-24 hours.    Drink plenty of water.    Resume any regular medications unless otherwise advised by your physician.  Please begin any new medication prescribed as a result of your procedure as directed by your physician.     If you had any biopsy or polyp removed please refrain from aspirin or aspirin products for 2 days.    Pain:    You may take  Tylenol as needed for pain.  Expected Recovery:    You can expect some mild abdominal fullness and/or discomfort due to the air used to inflate your intestinal tract. It is also normal to have a mild sore throat after upper endoscopy.    Call Your Physician if You Have:    After Colonoscopy:  o Worsening persisting abdominal pain which is worse with activity.  o Fevers (>101 degrees F), chills or shakes.  o Passage of continued blood with bowel movements.   o   Any questions or concerns about your recovery, please call [phone] or after hours [phone] Nurse Advice Line.    Follow-up Care:    You should receive a call or letter with your results within 1 week. Please call if you have not received a notification of your results.    If asked to return to clinic please make an appointment 1 week after your procedure.  Call [phone].     Same-Day Surgery   Adult Discharge Orders & Instructions     For 24 hours after surgery    1. Get plenty of rest.  A responsible adult must stay with you for at least 24 hours after you leave the hospital.   2. Do not drive or use heavy equipment.  If you have weakness or tingling, don't drive or use heavy equipment until this feeling goes away.  3. Do not drink alcohol.  4. Avoid strenuous or risky activities.  Ask for help when climbing stairs.   5. You may feel lightheaded.  If so, sit for a few minutes before standing.  Have someone help you get up.   6. You may have a slight fever. Call the doctor if your fever is over 100 F (37.7 C) (taken under the tongue) or lasts longer than 24 hours.  7. You may have a dry mouth, a sore throat, muscle aches or trouble sleeping.  These should go away after 24 hours.  8. Do not make important or legal decisions.  Based on the surgery/procedure that you had today, we do not expect that you will have any problems.  However, we want you to know what to do if you have pain, nausea, bleeding,or infection:  To control pain:  Take medicines  your physician has prescribed or or over-the counter medicine he or she advises.  Ice packs and periods of rest are often helpful.  For surgery on an arm or leg, raise it on a pillow to ease swelling.  If your pain is not managed with the above methods, contact your physician.  To control nausea:  Take anti-nausea medicine approved by your physician.  Drink clear liquids such as apple juice, ginger ale, broth or 7-Up. Be sure to drink enough fluids.  Move to a regular diet as you feel able.  Rest may also help.  Bleeding:  You may see a little blood on your dressing, about the size of a quarter in the first 24 hours.  If you see this, there is no reason to be alarmed.  However, if this continues to increase in size, apply pressure if able, and notify your physician.  Infection: Please contact your physician if you have any of the following signs:  redness, swelling, heat, increasing pain or foul-smelling drainage at your surgery site, fever or chills.    Call your doctor for any of the followin.  It has been over 8 to 10 hours since surgery and you are still not able to urinate (pass water).    2.  Headache for over 24 hours.        Nurse advice line: [phone]        Pending Results     No orders found from 2018 to 2018.            Admission Information     Date & Time Provider Department Dept. Phone    2018 Enmanuel Gerber DO [facility] [phone]      Your Vitals Were     Blood Pressure Pulse Temperature Respirations Pulse Oximetry       129/92 70 98.1  F (36.7  C) (Oral) 16 98%       Care EveryWhere ID     This is your Care EveryWhere ID. This could be used by other organizations to access your [place] medical records  [id number]        Equal Access to Services     KIM GALINDO : Shayne Persaud, mel millard, qajethro mohan. So [facility] [phone].    ATENCIÓN: Natali blanchard,  "tiene a giudry disposición servicios gratuitos de asistencia lingüística. Castro zhang [phone].    We comply with applicable federal civil rights laws and [place] laws. We do not discriminate on the basis of race, color, national origin, age, disability, sex, sexual orientation, or gender identity.               Review of your medicines      UNREVIEWED medicines. Ask your doctor about these medicines        Dose / Directions    aspirin 81 MG tablet        Dose:  162 mg   Take 2 tablets (162 mg) by mouth daily   Quantity:  100 tablet   Refills:  3       fluticasone 50 MCG/ACT spray   Commonly known as:  FLONASE   Used for:  Acute recurrent maxillary sinusitis        Dose:  1-2 spray   Spray 1-2 sprays into both nostrils daily   Quantity:  16 g   Refills:  11       lisinopril 5 MG tablet   Commonly known as:  PRINIVIL/ZESTRIL        1 1/2 TABLET DAILY   Quantity:  30   Refills:  0       meloxicam 15 MG tablet   Commonly known as:  MOBIC   Used for:  Effusion of right knee, Chondrocalcinosis of knee, right        Dose:  15 mg   Take 1 tablet (15 mg) by mouth daily   Quantity:  30 tablet   Refills:  1       Multi-vitamin Tabs tablet        Dose:  1 tablet   Take 1 tablet by mouth daily   Quantity:  100 tablet   Refills:  3       NOVOLOG SC        Refills:  0       SIMVASTATIN PO        Refills:  0         CONTINUE these medicines which have NOT CHANGED        Dose / Directions    ACCU-CHEK COMFORT CURVE test strip   Used for:  Type I (juvenile type) diabetes mellitus without mention of complication, not stated as uncontrolled   Generic drug:  blood glucose monitoring        5 times daily   Quantity:  100 each   Refills:  0       INSULIN SYRINGE-NEEDLE U-100 29G X 1/2\" 0.25 ML Misc        insulin pump   Refills:  0                Protect others around you: Learn how to safely use, store and throw away your medicines at www.disposemymeds.org.             Medication List: This is a list of all your medications and when to " "take them. Check marks below indicate your daily home schedule. Keep this list as a reference.      Medications           Morning Afternoon Evening Bedtime As Needed    ACCU-CHEK COMFORT CURVE test strip   5 times daily   Generic drug:  blood glucose monitoring                                aspirin 81 MG tablet   Take 2 tablets (162 mg) by mouth daily                                fluticasone 50 MCG/ACT spray   Commonly known as:  FLONASE   Spray 1-2 sprays into both nostrils daily                                INSULIN SYRINGE-NEEDLE U-100 29G X 1/2\" 0.25 ML Misc   insulin pump                                lisinopril 5 MG tablet   Commonly known as:  PRINIVIL/ZESTRIL   1 1/2 TABLET DAILY                                meloxicam 15 MG tablet   Commonly known as:  MOBIC   Take 1 tablet (15 mg) by mouth daily                                Multi-vitamin Tabs tablet   Take 1 tablet by mouth daily                                NOVOLOG SC                                SIMVASTATIN PO                                  "

## 2018-11-25 LAB — COPATH REPORT: NORMAL

## 2018-11-26 DIAGNOSIS — E10.9 DIABETES MELLITUS TYPE I (H): Primary | ICD-10-CM

## 2018-11-26 LAB — HBA1C MFR BLD: 8 % (ref 0–5.6)

## 2018-11-26 PROCEDURE — 36415 COLL VENOUS BLD VENIPUNCTURE: CPT | Performed by: INTERNAL MEDICINE

## 2018-11-26 PROCEDURE — 83036 HEMOGLOBIN GLYCOSYLATED A1C: CPT | Mod: QW | Performed by: INTERNAL MEDICINE

## 2018-12-18 ENCOUNTER — TELEPHONE (OUTPATIENT)
Dept: FAMILY MEDICINE | Facility: CLINIC | Age: 58
End: 2018-12-18

## 2018-12-18 NOTE — TELEPHONE ENCOUNTER
Reason for Call:  Other     Detailed comments: Please correct DOT form to not include vision on waver. Please call when approved.    Phone Number Patient can be reached at: Cell number on file:    Telephone Information:   Mobile [phone]     Best Time: any    Can we leave a detailed message on this number? YES    Call taken on 12/18/2018 at 9:30 AM by Zayra Sandoval

## 2018-12-18 NOTE — TELEPHONE ENCOUNTER
This writer attempted to contact Patient on 12/18/18      Reason for call DOT Card and left detailed message.      If patient calls back:   Please have patient come to clinic to . Patient needs to sign it before we can give it to him.         Lamar Capps

## 2019-01-22 ENCOUNTER — ANCILLARY PROCEDURE (OUTPATIENT)
Dept: GENERAL RADIOLOGY | Facility: OTHER | Age: 59
End: 2019-01-22
Payer: COMMERCIAL

## 2019-01-22 ENCOUNTER — OFFICE VISIT (OUTPATIENT)
Dept: PODIATRY | Facility: OTHER | Age: 59
End: 2019-01-22
Payer: COMMERCIAL

## 2019-01-22 VITALS
SYSTOLIC BLOOD PRESSURE: 130 MMHG | DIASTOLIC BLOOD PRESSURE: 82 MMHG | HEIGHT: 70 IN | WEIGHT: 197 LBS | BODY MASS INDEX: 28.2 KG/M2

## 2019-01-22 DIAGNOSIS — M19.072 PRIMARY OSTEOARTHRITIS OF LEFT FOOT: Primary | ICD-10-CM

## 2019-01-22 DIAGNOSIS — Z79.4 DIABETES MELLITUS DUE TO UNDERLYING CONDITION, CONTROLLED, WITHOUT COMPLICATION, WITH LONG-TERM CURRENT USE OF INSULIN (H): ICD-10-CM

## 2019-01-22 DIAGNOSIS — E08.9 DIABETES MELLITUS DUE TO UNDERLYING CONDITION, CONTROLLED, WITHOUT COMPLICATION, WITH LONG-TERM CURRENT USE OF INSULIN (H): ICD-10-CM

## 2019-01-22 PROCEDURE — 99203 OFFICE O/P NEW LOW 30 MIN: CPT | Performed by: PODIATRIST

## 2019-01-22 PROCEDURE — 73630 X-RAY EXAM OF FOOT: CPT | Mod: LT

## 2019-01-22 ASSESSMENT — PAIN SCALES - GENERAL: PAINLEVEL: NO PAIN (0)

## 2019-01-22 ASSESSMENT — MIFFLIN-ST. JEOR: SCORE: 1724.84

## 2019-01-22 NOTE — PATIENT INSTRUCTIONS
Reliable shoe stores: To maximize your experience and provide the best possible fit.  Be sure to show them your foot concerns and tell them Dr. Merritt sent you.      Stores listed in bold have only athletic shoes, and stores that are not bold are mostly casual or variety of shoes    [employer]  [address] [place] [address]  [phone]    [facility] [place]  [address] [place] [address]  [phone]     [facility]  [address] [place] [address]  [phone]    [employer]  [address] [facility]  [facility] [address]  [phone]    [employer]  [address] [place], [place] [address] [phone]    [employer]  [address] [place] [address]  [phone]                         [employer] [employer] Locations:     [address] [place] [address]   [phone]     [address] [place] [address] [address] [place] [address]   [phone]     [address] [place] [address]   [phone]     [address] [facility] [place] [address]   [phone]     [address] [place] [address]   [phone]     [address] [place] [address]   [phone]     [address]   [place] [address]   [phone]     [address] [facility]   [place] [address]   [phone]             How to find reasonable shoes          The correct width    Correct Fitting    Correct Length      Foot Distortion    Posture Distortion                          Torsional Rigidity      Grasp behind the heel and underneath the foot and twist      Bad    Excessive torsion/twist in midfoot     Less torsion/twist in midfoot is better                   Heel Counter Rigidity      Grasp just above   midsole and squeeze      Bad    Soft heel counter      Good    Rigid Heel Counter      Flexion Rigidity      Grasp shoe and bend from forefoot to rearfoot              DIABETES AND YOUR FEET    What effect does  "diabetes have on the feet?  Diabetes can result in several problems in the feet including contractures of the tendons leading to deformities and reduced function of the bones, skin ulcers or open sores on pressure points or prominent deformities, reduced sensation, reduced blood flow and thus reduced oxygen and immune cells to the tiny vessels in our feet. This all leads to higher risk of hospitalization, infections, and amputations.     What is neuropathy?  Neuropathy is a term used to describe a loss of nerve function.  Patients with diabetes are at risk of developing neuropathy if their sugars continue to run high and are above the normal value of 140.  The elevated blood sugar in the body enters the nerves causing it to swell and impair nerve function.  The higher the blood sugar and the longer it is elevated, the more damage is done to nerves.  This damage is permanent and irreversible.  These damaged sensory nerves can then cause reduced feeling or cause pain.  Damaged motor nerves can reduce blood flow and white blood cells into into your foot, skin and bones reducing your ability to heal a small problem. And neuropathy can cause tendons to become unbalanced and contribute to the formation of deformity and contractures in our feet. Often times, neuropathy can be prevented by controlling your blood sugar.  Your risk of developing neuropathy goes up dramatically as your hemoglobin A1C raises above 7.5.      How do I know if I have neuropathy?  When a person develops neuropathy, they usually begin to feel numbness or tingling in their feet and sometimes in their legs.  Other symptoms may include painful burning or hot feet, tingling, electrical sensations or feeling like insects or ants are crawling on your feet or legs.  If blood sugar remains above 140  for long periods of time, neuropathy can also occur in the hands.  When a person loses their \"protective threshold\" or ability to detect a 5.07 [place] " Tonya monofilament is when they have elevated risk for developing foot deformity, contractures, foot infections, amputations, Charcot arthropathy, or other complications. Keep your hemoglobin A1C below 7.5 to reduce this risk.    What is vascular disease?  Peripheral vascular disease is a term used to describe a loss or decrease in circulation (blood flow).  There is a problem in getting blood, immune cells, and oxygen to areas that need it.  Similar to neuropathy, sugars can build up in the walls of the arteries (blood vessels) and cause them to become swollen, thickened and hardened.  This decreases the amount of blood that can go to an area that needs it.  Though this is common in the legs of diabetic patients, it can also affect other arteries (blood vessels) in the body such as in the heart, kidney, eyes, and the blood flow into bones.  It is often seen first in the small vessels of her body notably our feet and toes.    How do I know if I have vascular disease?  In the legs, vascular disease usually results in cramping.  Patients who develop leg cramps after walking the same distance every time (i.e. One block, half a mile, ect.) need to let their doctors know so that their circulation may be checked.  Cramps causing severe pain in the feet and/or legs while sleeping and the cramps go away when you stand or hang your legs off the side of the bed, may also be a sign of poor blood circulation.  Occasional cramping in cold weather or on rare occasions with activity may not be due to poor circulation, but you should inform your doctor.    How can these problems be prevented?  The key to prevention is good blood sugar control all day every day.  Inadequate blood sugar control is the most common way patients experience these problems. Reducing, controlling and measuring your daily consumption of sugar or carbohydrates is essential to understanding and managing diabetes.  Physical activity (exercise) is a very  good way to help decrease your blood sugars.  Exercise can lower your blood sugar, blood pressure, and cholesterol.  It also reduces your risk for heart disease and stroke, relieves stress, and strengthens your heart, muscles and bones. Physical activity also increases your balance and reduces development of contractures and foot deformities over time. In addition, regular activity helps insulin work better, improves your blood circulation, and keeps your joints flexible.  If you're trying to lose weight, a combination of exercise and wise food choices can help you reach your target weight and maintain it.  Activity and exercise alone can not make up for poor diet choices, eating too much, or eating too many sugars or carbohydrates.  Ask your doctor for help when you are not meeting your blood sugar goals. Changes or increases in medication are powerful tools in reducing your blood sugar.    Know your blood sugar and hemoglobin A1C trend.  Upon first diagnosis or during acute illness, checking your blood sugar 4 times a day can help you understand how your diet, activity, and lifestyle affect your blood sugar.  Monitoring your hemoglobin A1C can help you understand how well you are managing blood sugar over the long run.  Your hemoglobin A1C tells you what your blood sugar averages all day, every day, over the past 90 days.       To experience the lowest risk of complications associated with diabetes such as neuropathy, loss of blood flow, bone or joint infection, charcot arthropathy, or amputation, the American Diabetes Association recommends a target hemoglobin A1C of less than 7.0%, while the American Association of Clinical Endocrinologists' recommendation is 6.5% or less.  Both organizations advise that the goals be individualized based on patient factors such as other health conditions, history of hypoglycemia, education, and life expectancy.  A patients risk of experiencing complications associated with  diabetes is only slightly elevated with a hemoglobin A1C above 6.0.  However, this risk goes up exponentially when the hemoglobin A1C is above 7.5.  The longer the hemoglobin A1C is elevated, the more risk that patient will experience in their lifetime. The damage that occurs to nerves, blood vessels, tendons, bones and body organs, while their hemoglobin A1C is elevated is mostly irreversible and worsens with each additional time period of elevated hemoglobin A1C.     You must understand and manage your disease.  Your health insurance or medical team cannot manage this disease for you.  When you take responsibility for understanding and managing your disease, you can expect to experience fewer problems associated with diabetes in your lifetime.  You will  Also experience a higher quality of life and health and reduced cost of health care.    Diabetic Foot Care Recommendations  The following are recommendations for avoiding serious foot problems or injury    DO'S  1. Be aware of your hemoglobin A1C and continue to follow up with your medical team for adjustments in your lifestyle and medication until your reach your A1C goal.  Keep this below 7.5 to reduce your risk of developing complications associated with diabetes.    2.  Wash your feet with lukewarm water and a mild soap and then dry them thoroughly, especially between the toes.  Gently floss your towel or washcloth between each toe at every bath.  Soaking your feet in water cannot clean dead skin, debris, and bacteria from your feet and is not necessary.   3. Examine your feet daily looking for cuts, corns, blisters, cracks, ect..., especially after wearing new shoes or increased or changed activities.  Make sure to look between your toes.  If you cannot see the bottom of your feet, set a mirror on the floor and hold your foot over it, or ask a family member to examine your feet for you daily.  Contact your doctor immediately if new problems are noted or if  sores are not healing.  4.  Immediately apply moisturizer cream such as Cetaphil to the tops and bottoms of your feet, avoiding areas between the toes.  Apply sunscreen or cover your feet if they will be exposed to extended sunlight.  5.Use clean comfortable shoes.  Socks should not have thick seams or cut off the circulation around the leg.  Break in new shoes slowly and rotate with older shoes until broken in.  Check the inside of your shoes with your hand to look for areas of irritation or objects that may have fallen into your shoes.    6. Keep slippers by the side of your bed for use during the night.  7. Shoes should be fitted by a professional and should not cause areas of irritation.  Check your feet regularly when wearing a new pair of shoes and replace them as needed.  8.  Talk to your doctor about proper exercise.  Exercise and stretching stimulate blood flow to your feet and maintain proper glucose levels.  Use it or lose it!  9.  Monitor your blood glucose level and your hemoglobin A1C.  Notify your doctor immediately if your blood sugar is abnormally high or low.  10.  Cut your nails straight across, but then gently round any sharp edges with a nail file.  If you have neuropathy, peripheral vascular disease or cannot see that well to trim your own toenails, see a medical professional for care.    DONT'S  1.  Do not soak your feet if you have an open sore or your provider has informed you that you have neuropathy or loss of protective threshold.  Use only lukewarm water and always check the temperature with your hand as hot water can easily burn your feet.    2.  Never use a hot water bottle or heating pad on your feet.  Also do not apply hot or cold compresses to your feet.  With decreased sensation, you could burn or freeze your feet.  Do not rest your feet near a heat source such as a heater or heat register.    3.  Do not apply any of these to your feet:    - over the counter medicine for corns or  warts    -  Harsh chemicals like boric acid    -  Do not self-treat corns, cuts, blisters or infections.  Always consult your doctor.   4.  Do not wear sandals, slippers or walk barefoot, especially on harsh surfaces.  5.  If you smoke, stop!!!

## 2019-01-22 NOTE — PROGRESS NOTES
HPI:  Enmanuel Mckeon is a 58 year old male who is seen in consultation at the request of self.    Pt presents for eval of:   (Onset, Location, L/R, Character, Treatments, Injury if yes)    XR Left foot today, 1/22/2019    1. DM Type 1 for 35 years     2. Onset 1/15/2019, plantar ball of Left foot pain w/callus. No injury noted. Presents today with work shoes.  Intermittent, sharp, stabbing, swelling, pain 6  Pain has improved past few days    Works as a water driller.    Weight management plan: Patient was referred to their PCP to discuss a diet and exercise plan.     Patient to follow up with Primary Care provider regarding elevated blood pressure.    ROS:  10 point ROS neg other than the symptoms noted above in the HPI.    Patient Active Problem List   Diagnosis     TYPE 1 DIABETES, HBA1C GOAL < 7%     CARDIOVASCULAR SCREENING; LDL GOAL LESS THAN 100     Hypertension goal BP (blood pressure) < 140/90     C. difficile colitis       PAST MEDICAL HISTORY:   Past Medical History:   Diagnosis Date     Diabetic eye exam (H) 08/12/11     Diabetic eye exam (H) 08/24/12, 2/14/14     Diabetic eye exam (H) 1/23/15     Essential hypertension, benign      Type I (juvenile type) diabetes mellitus without mention of complication, not stated as uncontrolled         PAST SURGICAL HISTORY:   Past Surgical History:   Procedure Laterality Date     COLONOSCOPY N/A 11/19/2018    Procedure: COLONOSCOPY WITH FORCEP BIOPSIES;  Surgeon: Enmaunel Gerber DO;  [facility] COLONOSCOPY W/WO BRUSH/WASH  01/27/09        MEDICATIONS:   Current Outpatient Medications:      aspirin 81 MG tablet, Take 2 tablets (162 mg) by mouth daily, Disp: 100 tablet, Rfl: 3     fluticasone (FLONASE) 50 MCG/ACT nasal spray, Spray 1-2 sprays into both nostrils daily, Disp: 16 g, Rfl: 11     glucose blood VI test strips (ACCU-CHEK COMFORT CURVE) strip, 5 times daily, Disp: 100 each, Rfl: 0     Insulin Aspart (NOVOLOG SC), , Disp: , Rfl:      " INSULIN SYRINGE-NEEDLE U-100 MISC 0.25 ML 29 X 1/2\",  insulin pump, Disp: , Rfl:      LISINOPRIL 5 MG OR TABS, 1 1/2 TABLET DAILY, Disp: 30, Rfl: 0     multivitamin, therapeutic with minerals (MULTI-VITAMIN) TABS, Take 1 tablet by mouth daily, Disp: 100 tablet, Rfl: 3     SIMVASTATIN PO, , Disp: , Rfl:      meloxicam (MOBIC) 15 MG tablet, Take 1 tablet (15 mg) by mouth daily, Disp: 30 tablet, Rfl: 1     ALLERGIES:    Allergies   Allergen Reactions     No Known Drug Allergies         SOCIAL HISTORY:   Social History     Socioeconomic History     Marital status:      Spouse name: Not on file     Number of children: Not on file     Years of education: Not on file     Highest education level: Not on file   Social Needs     Financial resource strain: Not on file     Food insecurity - worry: Not on file     Food insecurity - inability: Not on file     Transportation needs - medical: Not on file     Transportation needs - non-medical: Not on file   Occupational History     Not on file   Tobacco Use     Smoking status: Former Smoker     Smokeless tobacco: Former User     Tobacco comment: quite 20 years ago   Substance and Sexual Activity     Alcohol use: Yes     Alcohol/week: 0.0 oz     Comment: occasional     Drug use: No     Sexual activity: Yes     Partners: Female   Other Topics Concern     Parent/sibling w/ CABG, MI or angioplasty before 65F 55M? No   Social History Narrative     Not on file        FAMILY HISTORY:   Family History   Problem Relation Age of Onset     Alcohol/Drug Sister      Diabetes Father      Heart Disease Father         4 way bypass     Neurologic Disorder Mother         anurism        EXAM:Vitals: /82 (BP Location: Left arm, Cuff Size: Adult Large)   Ht 1.778 m (5' 10\")   Wt 89.4 kg (197 lb)   BMI 28.27 kg/m    BMI= Body mass index is 28.27 kg/m .    General appearance: Patient is alert and fully cooperative with history & exam.  No sign of distress is noted during the visit.   "   Psychiatric: Affect is pleasant & appropriate.  Patient appears motivated to improve health.     Respiratory: Breathing is regular & unlabored while sitting.     HEENT: Hearing is intact to spoken word.  Speech is clear.  No gross evidence of visual impairment that would impact ambulation.     Vascular: DP & PT pulses are intact & regular bilaterally.  No significant edema or varicosities noted.  CFT and skin temperature is normal to both lower extremities.     Neurologic: Lower extremity sensation is intact to light touch.  No evidence of weakness or contracture in the lower extremities.  No evidence of neuropathy.    Dermatologic: Skin is intact to both lower extremities with adequate texture, turgor and tone about the integument.  No paronychia or evidence of soft tissue infection is noted.     Musculoskeletal: Patient is ambulatory without assistive device or brace.  High arched cavus foot type is noted.  Prominent first metatarsal head left greater than right with hyperkeratosis plantar first met head.  Erythema or heat noted.  There is subtle discomfort with very firm palpation circumferentially around the left first metatarsal phalangeal joint not localized to the medial or lateral sesamoid.  No symptoms about the second metatarsal head.  The left shoe is completely compressed throughout the first metatarsal head.    Radiographs: Very mild degenerative changes a small cyst is noted about the lateral aspect of the base the proximal phalanx of the left hallux.  Good joint space noted otherwise.    Hemoglobin A1C (%)   Date Value   11/26/2018 8.0 (H)   11/16/2017 7.9 (H)   06/08/2017 7.8 (H)   06/09/2016 8.1 (H)   12/10/2015 8.1 (H)   05/28/2015 8.2 (H)   01/24/2015 8.0 (H)   02/19/2014 8.1 (H)     Creatinine (mg/dL)   Date Value   04/07/2018 0.71   11/16/2017 0.80   12/09/2016 0.76   12/10/2015 0.81   01/24/2015 0.75   01/17/2013 0.69        ASSESSMENT:       ICD-10-CM    1. Primary osteoarthritis of left  foot M19.072 ORTHOTICS REFERRAL   2. Diabetes mellitus due to underlying condition, controlled, without complication, with long-term current use of insulin (H) E08.9     Z79.4         PLAN:  Reviewed patient's chart in [facility].      1/22/2019   Obtained and interpreted radiographs.  Discussed treatment options.  Recommended new shoes to increase cushion about the forefoot with his high arched foot type and compressed first metatarsal head and the shoes.  Order placed for custom molded orthotic with first ray cut out.  Discussed oral medications and injections which she would rather not attempt at this time unless this becomes more painful again.  All questions were answered.  Written instructions dispensed follow-up as needed    Edenilson Merritt DPM

## 2019-02-08 ENCOUNTER — TELEPHONE (OUTPATIENT)
Dept: FAMILY MEDICINE | Facility: OTHER | Age: 59
End: 2019-02-08

## 2019-02-08 NOTE — LETTER
[facility]  [address] [place] [id number]  Phone: [phone]  February 27, 2019      Enmanuel Mckeon  [address]  [facility] [address]      Dear Enmanuel,    We care about your health and have reviewed your health plan including your medical conditions, medications, and lab results.  Based on this review, it is recommended that you follow up regarding the following health topic(s):  -Diabetes    We recommend you take the following action(s):  -schedule a FOLLOWUP OFFICE APPOINTMENT.  We will perform the following labs:  A1c and TSH (thyroid test).     Please call us at the [facility] - [phone] (or use [employer]) to address the above recommendations.     Thank you for trusting [facility] and we appreciate the opportunity to serve you.  We look forward to supporting your healthcare needs in the future.    Healthy Regards,    Your Health Care Team  [facility]

## 2019-02-08 NOTE — TELEPHONE ENCOUNTER
Summary:    Patient is due/failing the following:   Diabetic follow up, TSH and A1C    Action needed:   Patient needs office visit for follow up. and Patient needs non-fasting lab only appointment    Type of outreach:    Phone, left message for patient to call back.     Questions for provider review:    None                                                                                                                                    Malini Macias       Chart routed to Care Team .        Panel Management Review      Patient has the following on his problem list:   Diabetes    ASA: Passed    Last A1C  Lab Results   Component Value Date    A1C 8.0 11/26/2018    A1C 7.9 11/16/2017    A1C 7.8 06/08/2017    A1C 8.1 06/09/2016    A1C 8.1 12/10/2015     A1C tested: FAILED    Last LDL:    Lab Results   Component Value Date    CHOL 202 04/07/2018     Lab Results   Component Value Date     04/07/2018     Lab Results   Component Value Date    LDL 87 04/07/2018     Lab Results   Component Value Date    TRIG 44 04/07/2018     Lab Results   Component Value Date    CHOLHDLRATIO 1.6 01/24/2015     Lab Results   Component Value Date    NHDL 96 04/07/2018       Is the patient on a Statin? YES             Is the patient on Aspirin? YES    Medications     HMG CoA Reductase Inhibitors    SIMVASTATIN PO    Salicylates    aspirin 81 MG tablet          Last three blood pressure readings:  BP Readings from Last 3 Encounters:   01/22/19 130/82   11/19/18 (!) 129/92   11/12/18 122/74        Tobacco History:     History   Smoking Status     Former Smoker   Smokeless Tobacco     Former User     Comment: quite 20 years ago         Hypertension   Last three blood pressure readings:  BP Readings from Last 3 Encounters:   01/22/19 130/82   11/19/18 (!) 129/92   11/12/18 122/74     Blood pressure: Passed    HTN Guidelines:  Age 18-59 BP range:  Less than 140/90  Age 60-85 with Diabetes:  Less than 140/90  Age 60-85 without Diabetes:   less than 150/90      Composite cancer screening  Chart review shows that this patient is due/due soon for the following

## 2019-05-23 ENCOUNTER — HOSPITAL ENCOUNTER (EMERGENCY)
Facility: CLINIC | Age: 59
Discharge: HOME | End: 2019-05-23
Payer: COMMERCIAL

## 2019-06-13 ENCOUNTER — TELEPHONE (OUTPATIENT)
Dept: FAMILY MEDICINE | Facility: OTHER | Age: 59
End: 2019-06-13

## 2019-06-13 NOTE — TELEPHONE ENCOUNTER
Summary:    Patient is due/failing the following:   Diabetic follow up, BMP, Microalbumin, TSH, A1C and LDL    Action needed:   Patient needs office visit for Diabetic follow up. and Patient needs fasting lab only appointment    Type of outreach:    Phone, left message for patient to call back.     Questions for provider review:    None                                                                                                                                    Malini Macias       Chart routed to Care Team .    Panel Management Review      Patient has the following on his problem list:     Diabetes    ASA: Passed    Last A1C  Lab Results   Component Value Date    A1C 8.0 11/26/2018    A1C 7.9 11/16/2017    A1C 7.8 06/08/2017    A1C 8.1 06/09/2016    A1C 8.1 12/10/2015     A1C tested: FAILED    Last LDL:    Lab Results   Component Value Date    CHOL 202 04/07/2018     Lab Results   Component Value Date     04/07/2018     Lab Results   Component Value Date    LDL 87 04/07/2018     Lab Results   Component Value Date    TRIG 44 04/07/2018     Lab Results   Component Value Date    CHOLHDLRATIO 1.6 01/24/2015     Lab Results   Component Value Date    NHDL 96 04/07/2018       Is the patient on a Statin? YES             Is the patient on Aspirin? YES    Medications     HMG CoA Reductase Inhibitors     SIMVASTATIN PO       Salicylates     aspirin 81 MG tablet             Last three blood pressure readings:  BP Readings from Last 3 Encounters:   01/22/19 130/82   11/19/18 (!) 129/92   11/12/18 122/74        Tobacco History:     History   Smoking Status     Former Smoker   Smokeless Tobacco     Former User     Comment: quite 20 years ago         Hypertension   Last three blood pressure readings:  BP Readings from Last 3 Encounters:   01/22/19 130/82   11/19/18 (!) 129/92   11/12/18 122/74     Blood pressure: Passed    HTN Guidelines:  Less than 140/90      Composite cancer screening  Chart review shows that  this patient is due/due soon for the following None

## 2019-06-21 DIAGNOSIS — E10.9 DIABETES MELLITUS TYPE I (H): Primary | ICD-10-CM

## 2019-06-21 DIAGNOSIS — E78.2 MIXED HYPERLIPIDEMIA: ICD-10-CM

## 2019-06-21 LAB
ALBUMIN SERPL-MCNC: 3.6 G/DL (ref 3.4–5)
ANION GAP SERPL CALCULATED.3IONS-SCNC: 7 MMOL/L (ref 3–14)
BUN SERPL-MCNC: 21 MG/DL (ref 7–30)
CALCIUM SERPL-MCNC: 8.4 MG/DL (ref 8.5–10.1)
CHLORIDE SERPL-SCNC: 104 MMOL/L (ref 94–109)
CHOLEST SERPL-MCNC: 200 MG/DL
CO2 SERPL-SCNC: 26 MMOL/L (ref 20–32)
CREAT SERPL-MCNC: 0.79 MG/DL (ref 0.66–1.25)
CREAT UR-MCNC: 158 MG/DL
GFR SERPL CREATININE-BSD FRML MDRD: >90 ML/MIN/{1.73_M2}
GLUCOSE SERPL-MCNC: 182 MG/DL (ref 70–99)
HBA1C MFR BLD: 7.7 % (ref 0–5.6)
HDLC SERPL-MCNC: 93 MG/DL
LDLC SERPL CALC-MCNC: 95 MG/DL
MICROALBUMIN UR-MCNC: 11 MG/L
MICROALBUMIN/CREAT UR: 7.15 MG/G CR (ref 0–17)
NONHDLC SERPL-MCNC: 107 MG/DL
PHOSPHATE SERPL-MCNC: 3 MG/DL (ref 2.5–4.5)
POTASSIUM SERPL-SCNC: 4.6 MMOL/L (ref 3.4–5.3)
SODIUM SERPL-SCNC: 137 MMOL/L (ref 133–144)
TRIGL SERPL-MCNC: 61 MG/DL
TSH SERPL DL<=0.005 MIU/L-ACNC: 1.2 MU/L (ref 0.4–4)

## 2019-06-21 PROCEDURE — 36415 COLL VENOUS BLD VENIPUNCTURE: CPT | Performed by: INTERNAL MEDICINE

## 2019-06-21 PROCEDURE — 80069 RENAL FUNCTION PANEL: CPT | Performed by: INTERNAL MEDICINE

## 2019-06-21 PROCEDURE — 84443 ASSAY THYROID STIM HORMONE: CPT | Performed by: INTERNAL MEDICINE

## 2019-06-21 PROCEDURE — 80061 LIPID PANEL: CPT | Performed by: INTERNAL MEDICINE

## 2019-06-21 PROCEDURE — 83036 HEMOGLOBIN GLYCOSYLATED A1C: CPT | Mod: QW | Performed by: INTERNAL MEDICINE

## 2019-06-21 PROCEDURE — 82043 UR ALBUMIN QUANTITATIVE: CPT | Performed by: INTERNAL MEDICINE

## 2019-11-26 ENCOUNTER — OFFICE VISIT (OUTPATIENT)
Dept: URGENT CARE | Facility: RETAIL CLINIC | Age: 59
End: 2019-11-26
Payer: COMMERCIAL

## 2019-11-26 VITALS
HEART RATE: 79 BPM | OXYGEN SATURATION: 97 % | RESPIRATION RATE: 16 BRPM | TEMPERATURE: 98.3 F | DIASTOLIC BLOOD PRESSURE: 77 MMHG | SYSTOLIC BLOOD PRESSURE: 129 MMHG

## 2019-11-26 DIAGNOSIS — J01.90 ACUTE SINUSITIS WITH SYMPTOMS > 10 DAYS: Primary | ICD-10-CM

## 2019-11-26 PROCEDURE — 99213 OFFICE O/P EST LOW 20 MIN: CPT | Performed by: INTERNAL MEDICINE

## 2019-11-26 RX ORDER — AZITHROMYCIN 250 MG/1
TABLET, FILM COATED ORAL
Qty: 6 TABLET | Refills: 0 | Status: SHIPPED | OUTPATIENT
Start: 2019-11-26 | End: 2020-03-30

## 2019-12-03 ENCOUNTER — IMMUNIZATION (OUTPATIENT)
Dept: FAMILY MEDICINE | Facility: CLINIC | Age: 59
End: 2019-12-03
Payer: COMMERCIAL

## 2019-12-03 DIAGNOSIS — Z23 NEED FOR PROPHYLACTIC VACCINATION AND INOCULATION AGAINST INFLUENZA: Primary | ICD-10-CM

## 2019-12-03 DIAGNOSIS — E78.2 MIXED HYPERLIPIDEMIA: ICD-10-CM

## 2019-12-03 DIAGNOSIS — E10.9 DIABETES MELLITUS TYPE I (H): Primary | ICD-10-CM

## 2019-12-03 LAB
ALBUMIN SERPL-MCNC: 3.4 G/DL (ref 3.4–5)
ANION GAP SERPL CALCULATED.3IONS-SCNC: 4 MMOL/L (ref 3–14)
BUN SERPL-MCNC: 14 MG/DL (ref 7–30)
CALCIUM SERPL-MCNC: 8.6 MG/DL (ref 8.5–10.1)
CHLORIDE SERPL-SCNC: 109 MMOL/L (ref 94–109)
CHOLEST SERPL-MCNC: 205 MG/DL
CO2 SERPL-SCNC: 29 MMOL/L (ref 20–32)
CREAT SERPL-MCNC: 0.75 MG/DL (ref 0.66–1.25)
GFR SERPL CREATININE-BSD FRML MDRD: >90 ML/MIN/{1.73_M2}
GLUCOSE SERPL-MCNC: 134 MG/DL (ref 70–99)
HBA1C MFR BLD: 7.6 % (ref 0–5.6)
HDLC SERPL-MCNC: 90 MG/DL
LDLC SERPL CALC-MCNC: 105 MG/DL
NONHDLC SERPL-MCNC: 115 MG/DL
PHOSPHATE SERPL-MCNC: 2.7 MG/DL (ref 2.5–4.5)
POTASSIUM SERPL-SCNC: 4.4 MMOL/L (ref 3.4–5.3)
SODIUM SERPL-SCNC: 142 MMOL/L (ref 133–144)
TRIGL SERPL-MCNC: 50 MG/DL

## 2019-12-03 PROCEDURE — 90471 IMMUNIZATION ADMIN: CPT

## 2019-12-03 PROCEDURE — 90682 RIV4 VACC RECOMBINANT DNA IM: CPT

## 2019-12-03 PROCEDURE — 80069 RENAL FUNCTION PANEL: CPT | Performed by: INTERNAL MEDICINE

## 2019-12-03 PROCEDURE — 99207 ZZC NO CHARGE NURSE ONLY: CPT

## 2019-12-03 PROCEDURE — 83036 HEMOGLOBIN GLYCOSYLATED A1C: CPT | Mod: QW | Performed by: INTERNAL MEDICINE

## 2019-12-03 PROCEDURE — 80061 LIPID PANEL: CPT | Performed by: INTERNAL MEDICINE

## 2019-12-03 PROCEDURE — 36415 COLL VENOUS BLD VENIPUNCTURE: CPT | Performed by: INTERNAL MEDICINE

## 2020-03-30 ENCOUNTER — VIRTUAL VISIT (OUTPATIENT)
Dept: FAMILY MEDICINE | Facility: CLINIC | Age: 60
End: 2020-03-30
Payer: COMMERCIAL

## 2020-03-30 DIAGNOSIS — J01.00 ACUTE NON-RECURRENT MAXILLARY SINUSITIS: Primary | ICD-10-CM

## 2020-03-30 PROCEDURE — 99213 OFFICE O/P EST LOW 20 MIN: CPT | Mod: TEL | Performed by: FAMILY MEDICINE

## 2020-03-30 RX ORDER — AZITHROMYCIN 250 MG/1
TABLET, FILM COATED ORAL
Qty: 6 TABLET | Refills: 1 | Status: SHIPPED | OUTPATIENT
Start: 2020-03-30 | End: 2021-02-08

## 2020-03-30 NOTE — PROGRESS NOTES
"Called to set up telephone visit.   No answer left a message to call back.   Latasha Tierney, CATHERINE  Subjective     Enmanuel Mckeon is a 59 year old male who is being evaluated via a billable telephone visit.      The patient has been notified of following:     \"This telephone visit will be conducted via a call between you and your physician/provider. We have found that certain health care needs can be provided without the need for a physical exam.  This service lets us provide the care you need with a short phone conversation.  If a prescription is necessary we can send it directly to your pharmacy.  If lab work is needed we can place an order for that and you can then stop by our lab to have the test done at a later time.    If during the course of the call the physician/provider feels a telephone visit is not appropriate, you will not be charged for this service.\"     Physician has received verbal consent for a Telephone Visit from the patient? Yes    Enmanuel Mckeon complains of   Chief Complaint   Patient presents with     Sinus Problem     drainage, blood when blowing nose, and sore throat off and on for 4 to 5 weeks       ALLERGIES  No known drug allergies    Sinus problems.        Patient Active Problem List   Diagnosis     TYPE 1 DIABETES, HBA1C GOAL < 7%     CARDIOVASCULAR SCREENING; LDL GOAL LESS THAN 100     Hypertension goal BP (blood pressure) < 140/90     C. difficile colitis     Past Surgical History:   Procedure Laterality Date     COLONOSCOPY N/A 11/19/2018    Procedure: COLONOSCOPY WITH FORCEP BIOPSIES;  Surgeon: Enmanuel Gerber DO;  Location: [facility] COLONOSCOPY W/WO BRUSH/WASH  01/27/09       Social History     Tobacco Use     Smoking status: Former Smoker     Smokeless tobacco: Former User     Tobacco comment: quite 20 years ago   Substance Use Topics     Alcohol use: Yes     Alcohol/week: 0.0 standard drinks     Comment: occasional     Family History   Problem Relation Age of " "Onset     Alcohol/Drug Sister      Diabetes Father      Heart Disease Father         4 way bypass     Neurologic Disorder Mother         anurism         Current Outpatient Medications   Medication Sig Dispense Refill     aspirin 81 MG tablet Take 2 tablets (162 mg) by mouth daily 100 tablet 3     azithromycin (ZITHROMAX) 250 MG tablet Two tablets first day, then one tablet daily for four days. 6 tablet 1     fluticasone (FLONASE) 50 MCG/ACT nasal spray Spray 1-2 sprays into both nostrils daily 16 g 11     glucose blood VI test strips (ACCU-CHEK COMFORT CURVE) strip 5 times daily 100 each 0     Insulin Aspart (NOVOLOG SC)        INSULIN SYRINGE-NEEDLE U-100 MISC 0.25 ML 29 X 1/2\"  insulin pump       LISINOPRIL 5 MG OR TABS 1 1/2 TABLET DAILY 30 0     multivitamin, therapeutic with minerals (MULTI-VITAMIN) TABS Take 1 tablet by mouth daily 100 tablet 3     SIMVASTATIN PO        meloxicam (MOBIC) 15 MG tablet Take 1 tablet (15 mg) by mouth daily (Patient not taking: Reported on 3/30/2020) 30 tablet 1       Reviewed and updated as needed this visit by Provider         Review of Systems   ROS COMP: Constitutional, HEENT, cardiovascular, pulmonary, gi and gu systems are negative, except as otherwise noted.   Telephone encounter.  He is complaining of sinus congestion and pressure even some bloody nasal discharge.  He is not had any fevers.  This is not uncommon for him he gets this every year about this time and usually several times a year.  He has some Flonase he has been taking.  Denies any cough.    Objective   Reported vitals:  There were no vitals taken for this visit.   alert  Psych: Alert and oriented times 3; coherent speech, normal   rate and volume, able to articulate logical thoughts, able   to abstract reason, no tangential thoughts, no hallucinations   or delusions  His affect is bright            Assessment/Plan:  1. Acute non-recurrent maxillary sinusitis  Mention Augmentin at home but he feels he had this " last summer and it did not work as well.  He likes Zithromax.  I told him if 5 days after he is finished it he still has symptoms he could get a refill on it.  He is okay with that plan.  Continue with his Flonase.  Follow-up if not improving.  - azithromycin (ZITHROMAX) 250 MG tablet; Two tablets first day, then one tablet daily for four days.  Dispense: 6 tablet; Refill: 1    No follow-ups on file.      Phone call duration:  12 minutes    Horace White MD

## 2020-04-09 DIAGNOSIS — E10.9 DIABETES MELLITUS TYPE I (H): Primary | ICD-10-CM

## 2020-04-09 LAB — HBA1C MFR BLD: 8 % (ref 0–5.6)

## 2020-04-09 PROCEDURE — 36415 COLL VENOUS BLD VENIPUNCTURE: CPT | Performed by: INTERNAL MEDICINE

## 2020-04-09 PROCEDURE — 83036 HEMOGLOBIN GLYCOSYLATED A1C: CPT | Mod: QW | Performed by: INTERNAL MEDICINE

## 2020-06-18 ENCOUNTER — HOSPITAL ENCOUNTER (EMERGENCY)
Facility: CLINIC | Age: 60
Discharge: HOME | End: 2020-06-18
Attending: FAMILY MEDICINE | Admitting: FAMILY MEDICINE
Payer: COMMERCIAL

## 2020-06-18 ENCOUNTER — NURSE TRIAGE (OUTPATIENT)
Dept: FAMILY MEDICINE | Facility: CLINIC | Age: 60
End: 2020-06-18

## 2020-06-18 VITALS
OXYGEN SATURATION: 95 % | BODY MASS INDEX: 28.7 KG/M2 | WEIGHT: 200 LBS | HEART RATE: 75 BPM | RESPIRATION RATE: 18 BRPM | SYSTOLIC BLOOD PRESSURE: 125 MMHG | DIASTOLIC BLOOD PRESSURE: 84 MMHG | TEMPERATURE: 96.1 F

## 2020-06-18 DIAGNOSIS — E10.9 TYPE 1 DIABETES MELLITUS WITHOUT COMPLICATION (H): ICD-10-CM

## 2020-06-18 DIAGNOSIS — N17.9 ACUTE RENAL FAILURE, UNSPECIFIED ACUTE RENAL FAILURE TYPE (H): ICD-10-CM

## 2020-06-18 DIAGNOSIS — E86.0 DEHYDRATION: ICD-10-CM

## 2020-06-18 DIAGNOSIS — T67.5XXA HEAT EXHAUSTION, INITIAL ENCOUNTER: ICD-10-CM

## 2020-06-18 DIAGNOSIS — I10 HYPERTENSION GOAL BP (BLOOD PRESSURE) < 140/90: ICD-10-CM

## 2020-06-18 LAB
ALBUMIN SERPL-MCNC: 4.2 G/DL (ref 3.4–5)
ALP SERPL-CCNC: 61 U/L (ref 40–150)
ALT SERPL W P-5'-P-CCNC: 42 U/L (ref 0–70)
ANION GAP SERPL CALCULATED.3IONS-SCNC: 12 MMOL/L (ref 3–14)
AST SERPL W P-5'-P-CCNC: 41 U/L (ref 0–45)
BASOPHILS # BLD AUTO: 0.1 10E9/L (ref 0–0.2)
BASOPHILS NFR BLD AUTO: 0.7 %
BILIRUB SERPL-MCNC: 0.9 MG/DL (ref 0.2–1.3)
BUN SERPL-MCNC: 37 MG/DL (ref 7–30)
CALCIUM SERPL-MCNC: 8.6 MG/DL (ref 8.5–10.1)
CHLORIDE SERPL-SCNC: 101 MMOL/L (ref 94–109)
CO2 SERPL-SCNC: 21 MMOL/L (ref 20–32)
CREAT SERPL-MCNC: 2 MG/DL (ref 0.66–1.25)
D DIMER PPP FEU-MCNC: 0.3 UG/ML FEU (ref 0–0.5)
DIFFERENTIAL METHOD BLD: NORMAL
EOSINOPHIL NFR BLD AUTO: 0.1 %
ERYTHROCYTE [DISTWIDTH] IN BLOOD BY AUTOMATED COUNT: 12.6 % (ref 10–15)
GFR SERPL CREATININE-BSD FRML MDRD: 35 ML/MIN/{1.73_M2}
GLUCOSE SERPL-MCNC: 173 MG/DL (ref 70–99)
HCT VFR BLD AUTO: 46.6 % (ref 40–53)
HGB BLD-MCNC: 16.3 G/DL (ref 13.3–17.7)
IMM GRANULOCYTES # BLD: 0 10E9/L (ref 0–0.4)
IMM GRANULOCYTES NFR BLD: 0.4 %
LYMPHOCYTES # BLD AUTO: 1.2 10E9/L (ref 0.8–5.3)
LYMPHOCYTES NFR BLD AUTO: 17.4 %
MCH RBC QN AUTO: 30.2 PG (ref 26.5–33)
MCHC RBC AUTO-ENTMCNC: 35 G/DL (ref 31.5–36.5)
MCV RBC AUTO: 87 FL (ref 78–100)
MONOCYTES # BLD AUTO: 0.5 10E9/L (ref 0–1.3)
MONOCYTES NFR BLD AUTO: 7.1 %
NEUTROPHILS # BLD AUTO: 5.3 10E9/L (ref 1.6–8.3)
NEUTROPHILS NFR BLD AUTO: 74.3 %
NRBC # BLD AUTO: 0 10*3/UL
NRBC BLD AUTO-RTO: 0 /100
PLATELET # BLD AUTO: 246 10E9/L (ref 150–450)
POTASSIUM SERPL-SCNC: 4.3 MMOL/L (ref 3.4–5.3)
PROT SERPL-MCNC: 7.2 G/DL (ref 6.8–8.8)
RBC # BLD AUTO: 5.39 10E12/L (ref 4.4–5.9)
SODIUM SERPL-SCNC: 134 MMOL/L (ref 133–144)
TROPONIN I SERPL-MCNC: <0.015 UG/L (ref 0–0.04)
WBC # BLD AUTO: 7.1 10E9/L (ref 4–11)

## 2020-06-18 PROCEDURE — 99284 EMERGENCY DEPT VISIT MOD MDM: CPT | Mod: 25 | Performed by: FAMILY MEDICINE

## 2020-06-18 PROCEDURE — C9803 HOPD COVID-19 SPEC COLLECT: HCPCS | Performed by: FAMILY MEDICINE

## 2020-06-18 PROCEDURE — U0003 INFECTIOUS AGENT DETECTION BY NUCLEIC ACID (DNA OR RNA); SEVERE ACUTE RESPIRATORY SYNDROME CORONAVIRUS 2 (SARS-COV-2) (CORONAVIRUS DISEASE [COVID-19]), AMPLIFIED PROBE TECHNIQUE, MAKING USE OF HIGH THROUGHPUT TECHNOLOGIES AS DESCRIBED BY CMS-2020-01-R: HCPCS | Performed by: FAMILY MEDICINE

## 2020-06-18 PROCEDURE — 93010 ELECTROCARDIOGRAM REPORT: CPT | Mod: Z6 | Performed by: FAMILY MEDICINE

## 2020-06-18 PROCEDURE — 93005 ELECTROCARDIOGRAM TRACING: CPT | Performed by: FAMILY MEDICINE

## 2020-06-18 PROCEDURE — 96360 HYDRATION IV INFUSION INIT: CPT | Performed by: FAMILY MEDICINE

## 2020-06-18 PROCEDURE — 85379 FIBRIN DEGRADATION QUANT: CPT | Performed by: FAMILY MEDICINE

## 2020-06-18 PROCEDURE — 84484 ASSAY OF TROPONIN QUANT: CPT | Performed by: FAMILY MEDICINE

## 2020-06-18 PROCEDURE — 25800030 ZZH RX IP 258 OP 636: Performed by: FAMILY MEDICINE

## 2020-06-18 PROCEDURE — 85025 COMPLETE CBC W/AUTO DIFF WBC: CPT | Performed by: FAMILY MEDICINE

## 2020-06-18 PROCEDURE — 96361 HYDRATE IV INFUSION ADD-ON: CPT | Performed by: FAMILY MEDICINE

## 2020-06-18 PROCEDURE — 80053 COMPREHEN METABOLIC PANEL: CPT | Performed by: FAMILY MEDICINE

## 2020-06-18 RX ADMIN — SODIUM CHLORIDE 1000 ML: 9 INJECTION, SOLUTION INTRAVENOUS at 16:41

## 2020-06-18 RX ADMIN — SODIUM CHLORIDE 1000 ML: 9 INJECTION, SOLUTION INTRAVENOUS at 15:52

## 2020-06-18 ASSESSMENT — ENCOUNTER SYMPTOMS
FATIGUE: 1
CHEST TIGHTNESS: 0
DIARRHEA: 0
FEVER: 0
VOMITING: 0
SHORTNESS OF BREATH: 0
COUGH: 0
NAUSEA: 0
CHILLS: 0
DIAPHORESIS: 1
ABDOMINAL PAIN: 0
PALPITATIONS: 0
WHEEZING: 0

## 2020-06-18 NOTE — ED AVS SNAPSHOT
[facility] Emergency Department  [address] [place] [address]  Phone:  [phone]  Fax:  [phone]                                    Enmanuel Mckeon   MRN: [id number]    Department:  [facility] Emergency Department   Date of Visit:  6/18/2020           After Visit Summary Signature Page    I have received my discharge instructions, and my questions have been answered. I have discussed any challenges I see with this plan with the nurse or doctor.    ..........................................................................................................................................  Patient/Patient Representative Signature      ..........................................................................................................................................  Patient Representative Print Name and Relationship to Patient    ..................................................               ................................................  Date                                   Time    ..........................................................................................................................................  Reviewed by Signature/Title    ...................................................              ..............................................  Date                                               Time          22EPIC Rev 08/18

## 2020-06-18 NOTE — TELEPHONE ENCOUNTER
Reason for call:  Patient reporting a symptom    Symptom or request: Patient has been having neck and shoulder pain. No injury. Mother had a stroke at the same age and the signs were the same.     Duration (how long have symptoms been present): 1 week and getting worse    Have you been treated for this before? No    Additional comments:     Phone Number patient can be reached at:  Home number on file [phone] (home) or Cell number on file:    Telephone Information:   Mobile [phone]       Best Time:  any    Can we leave a detailed message on this number:  YES    Call taken on 6/18/2020 at 11:15 AM by Emerita Calles CNA

## 2020-06-18 NOTE — TELEPHONE ENCOUNTER
"Patient advised to go to the ER immediately due to shoulder pain that is present even when he is not moving and weakness all over and dizziness.   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  Patient advised to go to ER again due to his symptoms that could be possible cardiac related.    Rebecca Montoya RN on 6/18/2020 at 11:22 AM    Reason for Disposition    [1] Age > 40 AND [2] no obvious cause AND [3] pain even when not moving the arm    (Exception: pain is clearly made worse by moving arm or bending neck)    Additional Information    Negative: Passed out (i.e., lost consciousness, collapsed and was not responding)    Negative: Shock suspected (e.g., cold/pale/clammy skin, too weak to stand, low BP, rapid pulse)    Negative: [1] Similar pain previously AND [2] it was from \"heart attack\"    Negative: [1] Similar pain previously AND [2] it was from \"angina\" AND [3] not relieved by nitroglycerin    Negative: Sounds like a life-threatening emergency to the triager    Negative: Followed a shoulder injury    Negative: Chest pain    Negative: Difficulty breathing or unusual sweating (e.g., sweating without exertion)    Negative: [1] Pain lasting > 5 minutes AND [2] pain also present in chest  (Exception: pain is clearly made worse by movement)    Protocols used: SHOULDER PAIN-A-AH    "

## 2020-06-18 NOTE — DISCHARGE INSTRUCTIONS
You need to follow-up next week with a primary care provider to schedule a stress echo.  You should also get your labs rechecked to recheck your BUN and creatinine.  Stay out of the sun and heat until rechecked.  Eat well and drink plenty of fluids.

## 2020-06-18 NOTE — ED PROVIDER NOTES
History     Chief Complaint   Patient presents with     Chest Pain     HPI  Enmanuel Mckeon is a 60 year old male with history of type 1 diabetes and hypertension who presents to the emergency room after having some episodes of right-sided neck and shoulder discomfort.  He also had some fleeting episodes of chest pain.  He has been working outside the last 2 days and it is been in the 90s with high humidity.  The patient works [employer] and has for many years.  He has also experienced some symptoms of fatigue and lack of energy.  He has been trying to keep himself well-hydrated and drinking lots of fluids.  He is not on an insulin pump and feels his blood sugars have been well controlled.  Patient has no history of known heart disease.  He has a strong family history, type 1 diabetes for 20+ years, no smoking history, hypertension treated with medications and hyperlipidemia.  He has never had a stress test.  He sees ophthalmology on a regular basis and states his kidney function has been good/stable.      Allergies:  Allergies   Allergen Reactions     No Known Drug Allergies        Problem List:    Patient Active Problem List    Diagnosis Date Noted     C. difficile colitis 09/24/2017     Priority: Medium     Hypertension goal BP (blood pressure) < 140/90 07/13/2011     Priority: Medium     TYPE 1 DIABETES, HBA1C GOAL < 7% 10/31/2010     Priority: Medium     CARDIOVASCULAR SCREENING; LDL GOAL LESS THAN 100 10/31/2010     Priority: Medium        Past Medical History:    Past Medical History:   Diagnosis Date     Diabetic eye exam (H) 08/12/11     Diabetic eye exam (H) 08/24/12, 2/14/14     Diabetic eye exam (H) 1/23/15     Essential hypertension, benign      Type I (juvenile type) diabetes mellitus without mention of complication, not stated as uncontrolled        Past Surgical History:    Past Surgical History:   Procedure Laterality Date     COLONOSCOPY N/A 11/19/2018    Procedure: COLONOSCOPY WITH FORCEP  "BIOPSIES;  Surgeon: Enmanuel Gerber DO;  Location:  [facility]     [facility] COLONOSCOPY W/WO BRUSH/WASH  01/27/09       Family History:    Family History   Problem Relation Age of Onset     Alcohol/Drug Sister      Diabetes Father      Heart Disease Father         4 way bypass     Neurologic Disorder Mother         anurism       Social History:  Marital Status:   [2]  Social History     Tobacco Use     Smoking status: Former Smoker     Smokeless tobacco: Former User     Tobacco comment: quite 20 years ago   Substance Use Topics     Alcohol use: Yes     Alcohol/week: 0.0 standard drinks     Comment: occasional     Drug use: No        Medications:    aspirin 81 MG tablet  azithromycin (ZITHROMAX) 250 MG tablet  fluticasone (FLONASE) 50 MCG/ACT nasal spray  glucose blood VI test strips (ACCU-CHEK COMFORT CURVE) strip  Insulin Aspart (NOVOLOG SC)  INSULIN SYRINGE-NEEDLE U-100 MISC 0.25 ML 29 X 1/2\"  LISINOPRIL 5 MG OR TABS  meloxicam (MOBIC) 15 MG tablet  multivitamin, therapeutic with minerals (MULTI-VITAMIN) TABS  SIMVASTATIN PO          Review of Systems   Constitutional: Positive for diaphoresis and fatigue. Negative for chills and fever.   Respiratory: Negative for cough, chest tightness, shortness of breath and wheezing.    Cardiovascular: Positive for chest pain. Negative for palpitations and leg swelling.   Gastrointestinal: Negative for abdominal pain, diarrhea, nausea and vomiting.       Physical Exam   BP: 104/69  Pulse: 87  Heart Rate: 82  Temp: 96.1  F (35.6  C)  Resp: 18  Weight: 90.7 kg (200 lb)  SpO2: 96 %      Physical Exam  Vitals signs and nursing note reviewed.   Constitutional:       Comments: Alert pleasant smiling 60-year-old who appears in no acute distress.  He is afebrile and his vital signs are stable.  He is breathing at ease./80   Pulse 87   Temp 96.1  F (35.6  C) (Oral)   Resp 16   Wt 90.7 kg (200 lb)   SpO2 95%   BMI 28.70 kg/m       HENT:      Head: Normocephalic and " atraumatic.      Nose: Nose normal.      Mouth/Throat:      Mouth: Mucous membranes are moist.   Neck:      Musculoskeletal: Normal range of motion.      Comments: There is no tenderness over any of the spinous processes of his cervical or thoracic spine.  He has some minor discomfort in the paracervical and upper thoracic musculature.  No palpable muscle spasm.  Range of motion of the neck is unrestricted.  Cardiovascular:      Rate and Rhythm: Normal rate and regular rhythm.      Pulses: Normal pulses.      Heart sounds: Normal heart sounds.   Pulmonary:      Effort: Pulmonary effort is normal.      Breath sounds: Normal breath sounds.   Abdominal:      General: Abdomen is flat.   Musculoskeletal: Normal range of motion.         General: No swelling.   Skin:     General: Skin is warm and dry.      Capillary Refill: Capillary refill takes less than 2 seconds.   Neurological:      General: No focal deficit present.      Mental Status: He is alert.   Psychiatric:         Mood and Affect: Mood normal.         Behavior: Behavior normal.         ED Course              EKG Interpretation:      Interpreted by Yunier Santamaria MD   Symptoms at time of EKG: None   Rhythm: Normal sinus   Rate: Normal  Axis: Normal  Ectopy: None  Conduction: Normal  ST Segments/ T Waves: No ST-T wave changes and No acute ischemic changes  Q Waves: None  Comparison to prior: No old EKG available    Clinical Impression: normal EKG--normal sinus rhythm at a rate of 83 with no acute ST-T changes.  Results for orders placed or performed during the hospital encounter of 06/18/20   CBC with platelets differential     Status: None   Result Value Ref Range    WBC 7.1 4.0 - 11.0 10e9/L    RBC Count 5.39 4.4 - 5.9 10e12/L    Hemoglobin 16.3 13.3 - 17.7 g/dL    Hematocrit 46.6 40.0 - 53.0 %    MCV 87 78 - 100 fl    MCH 30.2 26.5 - 33.0 pg    MCHC 35.0 31.5 - 36.5 g/dL    RDW 12.6 10.0 - 15.0 %    Platelet Count 246 150 - 450 10e9/L    Diff Method  Automated Method     % Neutrophils 74.3 %    % Lymphocytes 17.4 %    % Monocytes 7.1 %    % Eosinophils 0.1 %    % Basophils 0.7 %    % Immature Granulocytes 0.4 %    Nucleated RBCs 0 0 /100    Absolute Neutrophil 5.3 1.6 - 8.3 10e9/L    Absolute Lymphocytes 1.2 0.8 - 5.3 10e9/L    Absolute Monocytes 0.5 0.0 - 1.3 10e9/L    Absolute Basophils 0.1 0.0 - 0.2 10e9/L    Abs Immature Granulocytes 0.0 0 - 0.4 10e9/L    Absolute Nucleated RBC 0.0    D dimer quantitative     Status: None   Result Value Ref Range    D Dimer 0.3 0.0 - 0.50 ug/ml FEU   Comprehensive metabolic panel     Status: Abnormal   Result Value Ref Range    Sodium 134 133 - 144 mmol/L    Potassium 4.3 3.4 - 5.3 mmol/L    Chloride 101 94 - 109 mmol/L    Carbon Dioxide 21 20 - 32 mmol/L    Anion Gap 12 3 - 14 mmol/L    Glucose 173 (H) 70 - 99 mg/dL    Urea Nitrogen 37 (H) 7 - 30 mg/dL    Creatinine 2.00 (H) 0.66 - 1.25 mg/dL    GFR Estimate 35 (L) >60 mL/min/[1.73_m2]    GFR Estimate If Black 41 (L) >60 mL/min/[1.73_m2]    Calcium 8.6 8.5 - 10.1 mg/dL    Bilirubin Total 0.9 0.2 - 1.3 mg/dL    Albumin 4.2 3.4 - 5.0 g/dL    Protein Total 7.2 6.8 - 8.8 g/dL    Alkaline Phosphatase 61 40 - 150 U/L    ALT 42 0 - 70 U/L    AST 41 0 - 45 U/L   Troponin I     Status: None   Result Value Ref Range    Troponin I ES <0.015 0.000 - 0.045 ug/L     4:01 PM--discussed the findings with the patient.  His renal function December 20 19th showed a BUN of 14 and a creatinine of 0.75.  Both are elevated today with BUN of 37 and a creatinine of 2.0.  Given the patient's history of outside work and diabetes and symptoms of fatigue I suspect this is all acute renal failure related to dehydration.  I recommended we give the patient a couple liters of fluid here.  I recommended he take the next several days off work and stay out of the sun.  Recheck his labs next week to confirm that his renal function has returned back to normal/baseline.  Procedures               Critical Care  time:  none                   Medications   0.9% sodium chloride BOLUS (0 mLs Intravenous Stopped 6/18/20 1640)     Followed by   0.9% sodium chloride BOLUS (0 mLs Intravenous Stopped 6/18/20 1745)       Assessments & Plan (with Medical Decision Making)   MIKKI--60-year-old patient with known history of hypertension and diabetes who presents to the emergency department feeling weak and fatigue but also having several episodes of some achiness in his neck and right shoulder.  Concern for his heart prompted trip to the ED.  His EKG and troponin are normal.  He has never had a stress test.  His symptoms are concerning and with his strong/high risk factors I recommended a stress echo.  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  Patient's renal function was normal in December 2019 with a BUN of 14 and a creatinine of 0.75 and today it is 37 and 2.0.  Gave him 2 L of normal saline.  Recommended he stay out of the hot sun for the next several days and recheck his labs next week to confirm return to baseline of normal renal function.  Also discussed the possibility that his neck and shoulder discomfort could be anginal and given his significant risk factors recommended he have a stress echo and follow-up with his primary care provider.  Recommended patient not do any heavy work outside until cleared.  He is self-employed and is in agreement.  Patient was discharged in improved/resolved condition.  He states that after the 2 L of fluid he felt back to his usual self and all symptoms had completely resolved.    I have reviewed the nursing notes.    I have reviewed the findings, diagnosis, plan and need for follow up with the patient.       New Prescriptions    No medications on file       Final diagnoses:   Dehydration   Heat exhaustion, initial encounter   Type 1 diabetes mellitus without complication (H)   Acute renal  failure, unspecified acute renal failure type (H)   Hypertension goal BP (blood pressure) < 140/90       6/18/2020   [facility] EMERGENCY DEPARTMENT     Rosalio, Yunier GARRIDO MD  06/18/20 3157       Rosalio, Yunier GARRIDO MD  06/18/20 9773

## 2020-06-18 NOTE — ED TRIAGE NOTES
Pt c/o CP x 3 days that has been coming and going.  Pt states activity ashley make it worse.  C/o mild SOB and nausea.   Does also say that specific movements can provoke the pain.

## 2020-06-19 LAB
SARS-COV-2 PCR COMMENT: NORMAL
SARS-COV-2 RNA SPEC QL NAA+PROBE: NEGATIVE
SARS-COV-2 RNA SPEC QL NAA+PROBE: NORMAL
SPECIMEN SOURCE: NORMAL
SPECIMEN SOURCE: NORMAL

## 2020-11-30 NOTE — LETTER
June 20, 2020        Enmanuel Mckeon  [address] [facility] [address]    This letter provides a written record that you were tested for COVID-19 on 6/18/20.       Your result was negative. This means that we didn t find the virus that causes COVID-19 in your sample. A test may show negative when you do actually have the virus. This can happen when the virus is in the early stages of infection, before you feel illness symptoms.    If you have symptoms   Stay home and away from others (self-isolate) until you meet ALL of the guidelines below:    You ve had no fever--and no medicine that reduces fever--for 3 full days (72 hours). And      Your other symptoms have gotten better. For example, your cough or breathing has improved. And     At least 10 days have passed since your symptoms started.    During this time:    Stay home. Don t go to work, school or anywhere else.     Stay in your own room, including for meals. Use your own bathroom if you can.    Stay away from others in your home. No hugging, kissing or shaking hands. No visitors.    Clean  high touch  surfaces often (doorknobs, counters, handles, etc.). Use a household cleaning spray or wipes. You can find a full list on the EPA website at www.epa.gov/pesticide-registration/list-n-disinfectants-use-against-sars-cov-2.    Cover your mouth and nose with a mask, tissue or washcloth to avoid spreading germs.    Wash your hands and face often with soap and water.    Going back to work  Check with your employer for any guidelines to follow for going back to work.    Employers: This document serves as formal notice that your employee tested negative for COVID-19, as of the testing date shown above.     Spiritual Plan of Care    Pt Name: Denise Estrella  Pt : 1949  Date: 2020    Visit type: In person    Reason for visit: Routine Visit    Visited with: Patient    Taxonomy:    · Intended Effects: Demonstrate caring and concern  · Methods: Offer spiritual/Jain support  · Interventions: Explain  role    I went to visit the patient but the patient was asleep. I left a note and a prayer card at the bedside and pastoral care will follow-up if the patient/family requests it.

## 2020-12-16 ENCOUNTER — ALLIED HEALTH/NURSE VISIT (OUTPATIENT)
Dept: FAMILY MEDICINE | Facility: CLINIC | Age: 60
End: 2020-12-16
Payer: COMMERCIAL

## 2020-12-16 DIAGNOSIS — Z23 NEED FOR PROPHYLACTIC VACCINATION AND INOCULATION AGAINST INFLUENZA: Primary | ICD-10-CM

## 2020-12-16 DIAGNOSIS — E78.2 MIXED HYPERLIPIDEMIA: ICD-10-CM

## 2020-12-16 DIAGNOSIS — E10.9 DIABETES MELLITUS TYPE I (H): Primary | ICD-10-CM

## 2020-12-16 LAB
ALBUMIN SERPL-MCNC: 3.5 G/DL (ref 3.4–5)
ANION GAP SERPL CALCULATED.3IONS-SCNC: 1 MMOL/L (ref 3–14)
BUN SERPL-MCNC: 12 MG/DL (ref 7–30)
CALCIUM SERPL-MCNC: 8.8 MG/DL (ref 8.5–10.1)
CHLORIDE SERPL-SCNC: 110 MMOL/L (ref 94–109)
CHOLEST SERPL-MCNC: 184 MG/DL
CO2 SERPL-SCNC: 31 MMOL/L (ref 20–32)
CREAT SERPL-MCNC: 0.72 MG/DL (ref 0.66–1.25)
CREAT UR-MCNC: 184 MG/DL
GFR SERPL CREATININE-BSD FRML MDRD: >90 ML/MIN/{1.73_M2}
GLUCOSE SERPL-MCNC: 89 MG/DL (ref 70–99)
HBA1C MFR BLD: 8.5 % (ref 0–5.6)
HDLC SERPL-MCNC: 92 MG/DL
LDLC SERPL CALC-MCNC: 79 MG/DL
MICROALBUMIN UR-MCNC: 11 MG/L
MICROALBUMIN/CREAT UR: 6.03 MG/G CR (ref 0–17)
NONHDLC SERPL-MCNC: 92 MG/DL
PHOSPHATE SERPL-MCNC: 2.6 MG/DL (ref 2.5–4.5)
POTASSIUM SERPL-SCNC: 4.1 MMOL/L (ref 3.4–5.3)
SODIUM SERPL-SCNC: 142 MMOL/L (ref 133–144)
TRIGL SERPL-MCNC: 64 MG/DL
TSH SERPL DL<=0.005 MIU/L-ACNC: 1.72 MU/L (ref 0.4–4)

## 2020-12-16 PROCEDURE — 80069 RENAL FUNCTION PANEL: CPT | Performed by: INTERNAL MEDICINE

## 2020-12-16 PROCEDURE — 90471 IMMUNIZATION ADMIN: CPT

## 2020-12-16 PROCEDURE — 90682 RIV4 VACC RECOMBINANT DNA IM: CPT

## 2020-12-16 PROCEDURE — 83036 HEMOGLOBIN GLYCOSYLATED A1C: CPT | Performed by: INTERNAL MEDICINE

## 2020-12-16 PROCEDURE — 36415 COLL VENOUS BLD VENIPUNCTURE: CPT | Performed by: INTERNAL MEDICINE

## 2020-12-16 PROCEDURE — 84443 ASSAY THYROID STIM HORMONE: CPT | Performed by: INTERNAL MEDICINE

## 2020-12-16 PROCEDURE — 80061 LIPID PANEL: CPT | Performed by: INTERNAL MEDICINE

## 2020-12-16 PROCEDURE — 99207 PR NO CHARGE NURSE ONLY: CPT

## 2020-12-16 PROCEDURE — 82043 UR ALBUMIN QUANTITATIVE: CPT | Performed by: INTERNAL MEDICINE

## 2021-02-08 ENCOUNTER — VIRTUAL VISIT (OUTPATIENT)
Dept: FAMILY MEDICINE | Facility: CLINIC | Age: 61
End: 2021-02-08
Payer: COMMERCIAL

## 2021-02-08 DIAGNOSIS — J01.01 ACUTE RECURRENT MAXILLARY SINUSITIS: Primary | ICD-10-CM

## 2021-02-08 DIAGNOSIS — J32.9 CHRONIC SINUSITIS, UNSPECIFIED LOCATION: ICD-10-CM

## 2021-02-08 PROCEDURE — 99213 OFFICE O/P EST LOW 20 MIN: CPT | Mod: TEL | Performed by: FAMILY MEDICINE

## 2021-02-08 NOTE — PROGRESS NOTES
Enmanuel is a 60 year old who is being evaluated via a billable telephone visit.      What phone number would you like to be contacted at? [phone]  How would you like to obtain your AVS? Mail a copy  Assessment & Plan     ASSESSMENT/ORDERS:    ICD-10-CM    1. Acute recurrent maxillary sinusitis  J01.01 Symptomatic COVID-19 Virus (Coronavirus) by PCR     amoxicillin-clavulanate (AUGMENTIN) 875-125 MG tablet   2. Chronic sinusitis, unspecified location  J32.9 OTOLARYNGOLOGY REFERRAL     PLAN:  1.  Due to current symptoms, COVID-19 testing indicated.  Order placed.  Instructions given to patient on self isolation and picking up phone when called to schedule.    2.  Antibiotic ordered if his COVID-19 test is negative.  By then, it will have been 10 days since symptoms onset.  Told patient best practice would be to wait 14 days if the ibuprofen and acetaminophen combo told to him today to treat his pain is effective.  3.  Needs ENT referral due to recurrent nature of his sinusitis and history of chronic symptoms preceding acute exacerbation this past week.                              Return in about 1 week (around 2/15/2021) for recheck if symptoms worsen or fail to improve.    Job Denny MD  [facility]     Enmanuel is a 60 year old who presents to clinic today for the following health issues     HPI       Acute Illness  Acute illness concerns: sinus infection   Onset/Duration: 1 week  Symptoms:  Fever: no  Chills/Sweats: no  Headache (location?): no  Sinus Pressure: YES  Conjunctivitis:  no  Ear Pain: YES: bilateral  Rhinorrhea: YES  Congestion: no  Sore Throat: YES  Cough: no  Wheeze: no  Decreased Appetite: YES  Nausea: no  Vomiting: no  Diarrhea: no  Dysuria/Freq.: no  Dysuria or Hematuria: no  Fatigue/Achiness: YES, teeth hurt  Sick/Strep Exposure: no  Therapies tried and outcome: None        He is up in [place] and would like to use [facility] and number is  [phone].     Review of Systems     Blood when he blows his nose.  Unable to smell due to congestion.  Taste is okay.  History of chronic sinus symptoms.  Has lasted for months.  Current symptoms worsened starting a week ago and then 3 days ago got worse still.      Has been using afrin for over 3 days.  Has never seen ENT.        Objective           Vitals:  No vitals were obtained today due to virtual visit.    Physical Exam   healthy, alert and no distress  PSYCH: Alert and oriented times 3; coherent speech, normal   rate and volume, able to articulate logical thoughts, able   to abstract reason, no tangential thoughts, no hallucinations   or delusions  His affect is normal  RESP: No cough, no audible wheezing, able to talk in full sentences  Remainder of exam unable to be completed due to telephone visits                Phone call duration: 21 minutes

## 2021-02-08 NOTE — PATIENT INSTRUCTIONS
Instructions for Patients  It is recommended that you have a test for coronavirus (COVID-19). This illness can cause fever, cough and trouble breathing. Many people get a mild case and get better on their own. Some people can get very sick.     Please follow these steps:    1. We will call to schedule your test.  2. A member of our care team will ask you some questions. Then, they will use a swab to collect samples from your nose and throat.     Our testing team will send you your test results.    How can I protect others?    Stay home and away from others (self-isolate) until:    You ve had no fever--and no medicine that reduces fever--for 1 full day (24 hours). And      Your other symptoms have resolved (gotten better). For example, your cough or breathing has improved. And     At least 10 days have passed since your symptoms started.    Stay at least 6 feet away from others. (If someone will drive you to your test, stay in the backseat, as far away from the  as you can.)     Don t go to work, school or anywhere else. When it s time for your test, go straight to the testing site. Don t make any stops on the way there or back.     Wash your hands and face often. Use soap and water.     Cover your mouth and nose with a mask, tissue or washcloth.     Don t touch anyone. No hugging, kissing or handshakes.    How can I take care of myself?    1. Get lots of rest. Drink extra fluids (unless a doctor has told you not to).     2. Take Tylenol (acetaminophen) for fever or pain. If you have liver or kidney problems, ask your family doctor if it's okay to take Tylenol.     Adults can take either:     650 mg (two 325 mg pills) every 4 to 6 hours, or     1,000 mg (two 500 mg pills) every 8 hours as needed.     Note: Don't take more than 3,000 mg in one day.   Acetaminophen is found in many medicines (both prescribed and over-the-counter medicines). Read all labels to be sure you don't take too much.   For children,  check the Tylenol bottle for the right dose. The dose is based on  the child's age or weight.    3. If you have other health problems (like cancer, heart failure, an organ transplant or severe kidney disease): Call your specialty clinic if you don't feel better in the next 2 days.    4. Know when to call 911: If your breathing is so bad that it keeps you from doing normal activities, call 911 or go to the emergency room. Tell them that you've been staying home and may have COVID-19.      Thank you for limiting contact with others, wearing a simple mask to cover your cough, practice good hand hygiene habits and accessing our virtual services where possible to limit the spread of this virus.    For more information about COVID19 and options for caring for yourself at home, please visit the CDC website at https://www.cdc.gov/coronavirus/2019-ncov/about/steps-when-sick.html  For more options for care at [facility], please visit our website at [email or web address]

## 2021-07-16 NOTE — PROGRESS NOTES
"Assessment & Plan     TYPE 1 DIABETES, HBA1C GOAL < 7%  Hypertension goal BP (blood pressure) < 140/90  Hyperlipidemia LDL goal <100  Carbuncle and furuncle of buttock  Patient has a history of C. difficile colitis so we will avoid oral antibiotics at this point in time given all possible.  We will have him do a sitz bath and recommend that he try to do this twice a day if possible and treat with topical antibiotic to see if he can express to the outside world.  Follow-up if not improving.  It is already improved today versus what it was yesterday.  He is scheduled to see his endocrinologist in the near future.  Declines further testing today.  - mupirocin (BACTROBAN) 2 % external ointment; Apply topically 3 times daily    Screening for HIV (human immunodeficiency virus)  Need for hepatitis C screening test  Need for vaccination  Declines.  He is scheduled to see his endocrinologist in the near future.     BMI:   Estimated body mass index is 27.06 kg/m  as calculated from the following:    Height as of this encounter: 1.803 m (5' 11\").    Weight as of this encounter: 88 kg (194 lb).   Weight management plan: Patient was referred to their PCP to discuss a diet and exercise plan.    Work on weight loss  Regular exercise  No follow-ups on file.    PAIG[facility]     Enmanuel Mckeon is a 61 year old male who presents to clinic today for the following health issues :    History of Present Illness       Back Pain:  He presents for follow up of back pain. Patient's back pain is a new problem.    Original cause of back pain: not sure  First noticed back pain: in the last week  Patient feels back pain: rarelyLocation of back pain:  Left buttock  Description of back pain: dull ache  Back pain spreads: left buttocks    Since patient first noticed back pain, pain is: unchanged  Does back pain interfere with his job:  No  On a scale of 1-10 (10 being the worst), patient " "describes pain as:  1  What makes back pain worse: sitting  Acupuncture: not tried  Acetaminophen: not tried  Activity or exercise: not tried  Chiropractor:  Not tried  Cold: not tried  Heat: not tried  Massage: not tried  Muscle relaxants: not tried  NSAIDS: not tried  Opioids: not tried  Physical Therapy: not tried  Rest: not tried  Steroid Injection: not tried  Stretching: not tried  Surgery: not tried  TENS unit: not tried  Topical pain relievers: not tried  Other healthcare providers patient is seeing for back pain: None    He eats 2-3 servings of fruits and vegetables daily.He consumes 1 sweetened beverage(s) daily.He exercises with enough effort to increase his heart rate 30 to 60 minutes per day.    He is taking medications regularly.       Lump by rectal area    Review of Systems   Constitutional, HEENT, cardiovascular, pulmonary, GI, , musculoskeletal, neuro, skin, endocrine and psych systems are negative, except as otherwise noted.      Objective    /66   Pulse 75   Temp 98  F (36.7  C) (Temporal)   Resp 16   Ht 1.803 m (5' 11\")   Wt 88 kg (194 lb)   SpO2 97%   BMI 27.06 kg/m    Body mass index is 27.06 kg/m .  Physical Exam   GENERAL: healthy, alert and no distress  NECK: no adenopathy, no asymmetry, masses, or scars and thyroid normal to palpation  RESP: lungs clear to auscultation - no rales, rhonchi or wheezes  CV: regular rate and rhythm, normal S1 S2, no S3 or S4, no murmur, click or rub, no peripheral edema and peripheral pulses strong  ABDOMEN: soft, nontender, no hepatosplenomegaly, no masses and bowel sounds normal  RECTAL (male): Patient has a small carbuncle of the left buttock at the verge of tissue by the anus.  He states that this has gone down in the last 24 hours.  No evidence of hemorrhoid at this point time.  MS: no gross musculoskeletal defects noted, no edema    No results found for this or any previous visit (from the past 24 hour(s)).      "

## 2021-07-19 ENCOUNTER — OFFICE VISIT (OUTPATIENT)
Dept: FAMILY MEDICINE | Facility: OTHER | Age: 61
End: 2021-07-19
Payer: COMMERCIAL

## 2021-07-19 VITALS
RESPIRATION RATE: 16 BRPM | OXYGEN SATURATION: 97 % | DIASTOLIC BLOOD PRESSURE: 66 MMHG | BODY MASS INDEX: 27.16 KG/M2 | WEIGHT: 194 LBS | HEIGHT: 71 IN | SYSTOLIC BLOOD PRESSURE: 128 MMHG | TEMPERATURE: 98 F | HEART RATE: 75 BPM

## 2021-07-19 DIAGNOSIS — E78.5 HYPERLIPIDEMIA LDL GOAL <100: ICD-10-CM

## 2021-07-19 DIAGNOSIS — Z11.4 SCREENING FOR HIV (HUMAN IMMUNODEFICIENCY VIRUS): ICD-10-CM

## 2021-07-19 DIAGNOSIS — Z23 NEED FOR VACCINATION: Primary | ICD-10-CM

## 2021-07-19 DIAGNOSIS — Z11.59 NEED FOR HEPATITIS C SCREENING TEST: ICD-10-CM

## 2021-07-19 DIAGNOSIS — I10 HYPERTENSION GOAL BP (BLOOD PRESSURE) < 140/90: ICD-10-CM

## 2021-07-19 DIAGNOSIS — L02.33 CARBUNCLE AND FURUNCLE OF BUTTOCK: ICD-10-CM

## 2021-07-19 DIAGNOSIS — E10.9 TYPE 1 DIABETES, HBA1C GOAL < 7% (H): ICD-10-CM

## 2021-07-19 PROCEDURE — 99213 OFFICE O/P EST LOW 20 MIN: CPT | Performed by: PHYSICIAN ASSISTANT

## 2021-07-19 RX ORDER — MUPIROCIN 20 MG/G
OINTMENT TOPICAL 3 TIMES DAILY
Qty: 30 G | Refills: 0 | Status: SHIPPED | OUTPATIENT
Start: 2021-07-19 | End: 2022-11-21

## 2021-07-19 ASSESSMENT — MIFFLIN-ST. JEOR: SCORE: 1712.11

## 2021-08-18 ENCOUNTER — LAB (OUTPATIENT)
Dept: LAB | Facility: CLINIC | Age: 61
End: 2021-08-18
Payer: COMMERCIAL

## 2021-08-18 DIAGNOSIS — E10.9 DIABETES MELLITUS TYPE I (H): Primary | ICD-10-CM

## 2021-08-18 DIAGNOSIS — E78.2 MIXED HYPERLIPIDEMIA: ICD-10-CM

## 2021-08-18 LAB
ALBUMIN SERPL-MCNC: 3.5 G/DL (ref 3.4–5)
ALP SERPL-CCNC: 62 U/L (ref 40–150)
ALT SERPL W P-5'-P-CCNC: 49 U/L (ref 0–70)
ANION GAP SERPL CALCULATED.3IONS-SCNC: 2 MMOL/L (ref 3–14)
AST SERPL W P-5'-P-CCNC: 34 U/L (ref 0–45)
BILIRUB DIRECT SERPL-MCNC: 0.2 MG/DL (ref 0–0.2)
BILIRUB SERPL-MCNC: 0.4 MG/DL (ref 0.2–1.3)
BUN SERPL-MCNC: 15 MG/DL (ref 7–30)
CALCIUM SERPL-MCNC: 8.5 MG/DL (ref 8.5–10.1)
CHLORIDE BLD-SCNC: 110 MMOL/L (ref 94–109)
CHOLEST SERPL-MCNC: 199 MG/DL
CO2 SERPL-SCNC: 29 MMOL/L (ref 20–32)
CREAT SERPL-MCNC: 0.81 MG/DL (ref 0.66–1.25)
FASTING STATUS PATIENT QL REPORTED: YES
GFR SERPL CREATININE-BSD FRML MDRD: >90 ML/MIN/1.73M2
GLUCOSE BLD-MCNC: 111 MG/DL (ref 70–99)
HBA1C MFR BLD: 7.9 % (ref 0–5.6)
HDLC SERPL-MCNC: 106 MG/DL
LDLC SERPL CALC-MCNC: 82 MG/DL
NONHDLC SERPL-MCNC: 93 MG/DL
PHOSPHATE SERPL-MCNC: 3.1 MG/DL (ref 2.5–4.5)
POTASSIUM BLD-SCNC: 4.2 MMOL/L (ref 3.4–5.3)
PROT SERPL-MCNC: 6.5 G/DL (ref 6.8–8.8)
SODIUM SERPL-SCNC: 141 MMOL/L (ref 133–144)
TRIGL SERPL-MCNC: 55 MG/DL

## 2021-08-18 PROCEDURE — 83036 HEMOGLOBIN GLYCOSYLATED A1C: CPT

## 2021-08-18 PROCEDURE — 36415 COLL VENOUS BLD VENIPUNCTURE: CPT

## 2021-08-18 PROCEDURE — 82248 BILIRUBIN DIRECT: CPT

## 2021-08-18 PROCEDURE — 80061 LIPID PANEL: CPT

## 2021-08-18 PROCEDURE — 84100 ASSAY OF PHOSPHORUS: CPT

## 2021-08-18 PROCEDURE — 80053 COMPREHEN METABOLIC PANEL: CPT

## 2021-12-21 ENCOUNTER — LAB (OUTPATIENT)
Dept: LAB | Facility: CLINIC | Age: 61
End: 2021-12-21
Payer: COMMERCIAL

## 2021-12-21 DIAGNOSIS — E78.2 MIXED HYPERLIPIDEMIA: ICD-10-CM

## 2021-12-21 DIAGNOSIS — E10.9 DIABETES MELLITUS TYPE I (H): ICD-10-CM

## 2021-12-21 DIAGNOSIS — E10.9 DIABETES MELLITUS TYPE I (H): Primary | ICD-10-CM

## 2021-12-21 LAB
CHOLEST SERPL-MCNC: 199 MG/DL
CREAT UR-MCNC: 127 MG/DL
FASTING STATUS PATIENT QL REPORTED: YES
HBA1C MFR BLD: 7.7 % (ref 0–5.6)
HDLC SERPL-MCNC: 86 MG/DL
LDLC SERPL CALC-MCNC: 90 MG/DL
MICROALBUMIN UR-MCNC: 8 MG/L
MICROALBUMIN/CREAT UR: 6.3 MG/G CR (ref 0–17)
NONHDLC SERPL-MCNC: 113 MG/DL
TRIGL SERPL-MCNC: 114 MG/DL

## 2021-12-21 PROCEDURE — 36415 COLL VENOUS BLD VENIPUNCTURE: CPT

## 2021-12-21 PROCEDURE — 83036 HEMOGLOBIN GLYCOSYLATED A1C: CPT

## 2021-12-21 PROCEDURE — 80061 LIPID PANEL: CPT

## 2021-12-21 PROCEDURE — 82043 UR ALBUMIN QUANTITATIVE: CPT

## 2022-03-09 ENCOUNTER — LAB (OUTPATIENT)
Dept: LAB | Facility: CLINIC | Age: 62
End: 2022-03-09
Payer: COMMERCIAL

## 2022-03-09 DIAGNOSIS — E10.9 DIABETES MELLITUS TYPE I (H): Primary | ICD-10-CM

## 2022-03-09 LAB — HBA1C MFR BLD: 7.6 % (ref 0–5.6)

## 2022-03-09 PROCEDURE — 36415 COLL VENOUS BLD VENIPUNCTURE: CPT

## 2022-03-09 PROCEDURE — 83036 HEMOGLOBIN GLYCOSYLATED A1C: CPT

## 2022-05-19 ENCOUNTER — OFFICE VISIT (OUTPATIENT)
Dept: FAMILY MEDICINE | Facility: CLINIC | Age: 62
End: 2022-05-19
Payer: COMMERCIAL

## 2022-05-19 VITALS
RESPIRATION RATE: 18 BRPM | TEMPERATURE: 97.6 F | OXYGEN SATURATION: 96 % | WEIGHT: 202 LBS | SYSTOLIC BLOOD PRESSURE: 128 MMHG | BODY MASS INDEX: 28.28 KG/M2 | HEART RATE: 96 BPM | DIASTOLIC BLOOD PRESSURE: 70 MMHG | HEIGHT: 71 IN

## 2022-05-19 DIAGNOSIS — E10.9 TYPE 1 DIABETES, HBA1C GOAL < 7% (H): ICD-10-CM

## 2022-05-19 DIAGNOSIS — L23.7 CONTACT DERMATITIS DUE TO POISON IVY: Primary | ICD-10-CM

## 2022-05-19 PROCEDURE — 99214 OFFICE O/P EST MOD 30 MIN: CPT | Performed by: FAMILY MEDICINE

## 2022-05-19 RX ORDER — CEPHALEXIN 500 MG/1
500 CAPSULE ORAL 3 TIMES DAILY
Qty: 30 CAPSULE | Refills: 0 | Status: SHIPPED | OUTPATIENT
Start: 2022-05-19 | End: 2022-11-21

## 2022-05-19 RX ORDER — BETAMETHASONE DIPROPIONATE 0.5 MG/G
CREAM TOPICAL 3 TIMES DAILY
Qty: 50 G | Refills: 0 | Status: SHIPPED | OUTPATIENT
Start: 2022-05-19 | End: 2022-11-21

## 2022-05-19 RX ORDER — PREDNISONE 20 MG/1
40 TABLET ORAL DAILY
Qty: 10 TABLET | Refills: 0 | Status: SHIPPED | OUTPATIENT
Start: 2022-05-19 | End: 2022-10-31

## 2022-05-19 ASSESSMENT — PAIN SCALES - GENERAL: PAINLEVEL: NO PAIN (0)

## 2022-05-19 NOTE — PROGRESS NOTES
"  Assessment & Plan     Contact dermatitis due to poison ivy  Reddened areas streaked and blistering on his right foot.  He was golfing and was in some brush.  He feels that solids started.  It is pruritic.  Also has some tender nodes and little swelling in his right inguinal area I feel is associated with this.  Curiously this almost has an appearance of herpes simplex but it does not seem to go along with the clinical history.  To cover him with some Keflex as it could be a little bit infected.  Also some Diprolene cream.  Cautiously use some oral prednisone as he is diabetic and he can monitor his sugars closely.  He is going to follow-up if these measures do not seem to clear things up.  - cephALEXin (KEFLEX) 500 MG capsule; Take 1 capsule (500 mg) by mouth 3 times daily  - augmented betamethasone dipropionate (DIPROLENE AF) 0.05 % external cream; Apply topically 3 times daily  - predniSONE (DELTASONE) 20 MG tablet; Take 2 tablets (40 mg) by mouth daily    Type 1 diabetes, HbA1c goal < 7% (H)  He has a pump.  He will monitor his sugars very closely while he is taking the prednisone.               BMI:   Estimated body mass index is 28.17 kg/m  as calculated from the following:    Height as of this encounter: 1.803 m (5' 11\").    Weight as of this encounter: 91.6 kg (202 lb).           No follow-ups on file.    Horace White MD  [facility] STEFAN Singer is a 62 year old who presents for the following health issues: Was out golfing on the weekend and had an errant shot into the brush.  He feels he got into some poison ivy and he got onto his foot mainly on the dorsum.  Some blistering there noted.  Redness.  It is pruritic.  This morning he noted some slight tenderness and maybe a little fullness in his right inguinal region.      Rash  Associated symptoms include a rash.   History of Present Illness       Reason for visit:  Poison ivy on my right foot  Symptom onset:  1-3 days " "ago  Symptom intensity:  Moderate  Symptom progression:  Worsening  Had these symptoms before:  Yes  Has tried/received treatment for these symptoms:  Yes  Previous treatment was successful:  Yes  Prior treatment description:  Marquez  What makes it worse:  No  What makes it better:  No    He eats 0-1 servings of fruits and vegetables daily.He consumes 1 sweetened beverage(s) daily.He exercises with enough effort to increase his heart rate 30 to 60 minutes per day.  He exercises with enough effort to increase his heart rate 5 days per week. He is missing 1 dose(s) of medications per week.       Concern - possible hernia  Onset: yesterday  Description:  Right groin  Intensity: mild  Progression of Symptoms:  same  Accompanying Signs & Symptoms: swelling  Previous history of similar problem: none         Review of Systems   Skin: Positive for rash.      Constitutional, HEENT, cardiovascular, pulmonary, gi and gu systems are negative, except as otherwise noted.      Objective    /70 (BP Location: Right arm, Patient Position: Sitting, Cuff Size: Adult Regular)   Pulse 96   Temp 97.6  F (36.4  C) (Temporal)   Resp 18   Ht 1.803 m (5' 11\")   Wt 91.6 kg (202 lb)   SpO2 96%   BMI 28.17 kg/m    Body mass index is 28.17 kg/m .  Physical Exam   GENERAL: healthy, alert and no distress  MS: no gross musculoskeletal defects noted, no edema  SKIN: Reddened areas on the dorsum of the right foot blistery with a reddened base.  Some redness in between the toes.  NEURO: Normal strength and tone, mentation intact and speech normal  PSYCH: mentation appears normal, affect normal/bright  LYMPH: Firm fullness and some smaller nodes are little tender in the right inguinal region.                "

## 2022-07-21 ENCOUNTER — IMMUNIZATION (OUTPATIENT)
Dept: FAMILY MEDICINE | Facility: CLINIC | Age: 62
End: 2022-07-21
Payer: COMMERCIAL

## 2022-07-21 PROCEDURE — 0054A COVID-19,PF,PFIZER (12+ YRS): CPT

## 2022-07-21 PROCEDURE — 91305 COVID-19,PF,PFIZER (12+ YRS): CPT

## 2022-10-21 ENCOUNTER — LAB (OUTPATIENT)
Dept: LAB | Facility: CLINIC | Age: 62
End: 2022-10-21
Payer: COMMERCIAL

## 2022-10-21 DIAGNOSIS — E78.2 MIXED HYPERLIPIDEMIA: ICD-10-CM

## 2022-10-21 DIAGNOSIS — E10.9 DIABETES MELLITUS TYPE I (H): Primary | ICD-10-CM

## 2022-10-21 LAB
ALBUMIN SERPL-MCNC: 3.7 G/DL (ref 3.4–5)
ALP SERPL-CCNC: 59 U/L (ref 40–150)
ALT SERPL W P-5'-P-CCNC: 40 U/L (ref 0–70)
ANION GAP SERPL CALCULATED.3IONS-SCNC: 6 MMOL/L (ref 3–14)
AST SERPL W P-5'-P-CCNC: 29 U/L (ref 0–45)
BILIRUB DIRECT SERPL-MCNC: 0.2 MG/DL (ref 0–0.2)
BILIRUB SERPL-MCNC: 0.5 MG/DL (ref 0.2–1.3)
BUN SERPL-MCNC: 20 MG/DL (ref 7–30)
CALCIUM SERPL-MCNC: 8.9 MG/DL (ref 8.5–10.1)
CHLORIDE BLD-SCNC: 109 MMOL/L (ref 94–109)
CHOLEST SERPL-MCNC: 187 MG/DL
CO2 SERPL-SCNC: 28 MMOL/L (ref 20–32)
CREAT SERPL-MCNC: 0.75 MG/DL (ref 0.66–1.25)
CREAT UR-MCNC: 218 MG/DL
FASTING STATUS PATIENT QL REPORTED: YES
GFR SERPL CREATININE-BSD FRML MDRD: >90 ML/MIN/1.73M2
GLUCOSE BLD-MCNC: 136 MG/DL (ref 70–99)
HBA1C MFR BLD: 7.9 % (ref 0–5.6)
HDLC SERPL-MCNC: 85 MG/DL
LDLC SERPL CALC-MCNC: 90 MG/DL
MICROALBUMIN UR-MCNC: 18 MG/L
MICROALBUMIN/CREAT UR: 8.26 MG/G CR (ref 0–17)
NONHDLC SERPL-MCNC: 102 MG/DL
PHOSPHATE SERPL-MCNC: 4.5 MG/DL (ref 2.5–4.5)
POTASSIUM BLD-SCNC: 4.4 MMOL/L (ref 3.4–5.3)
PROT SERPL-MCNC: 6.6 G/DL (ref 6.8–8.8)
SODIUM SERPL-SCNC: 143 MMOL/L (ref 133–144)
TRIGL SERPL-MCNC: 59 MG/DL
TSH SERPL DL<=0.005 MIU/L-ACNC: 1.55 MU/L (ref 0.4–4)

## 2022-10-21 PROCEDURE — 84443 ASSAY THYROID STIM HORMONE: CPT

## 2022-10-21 PROCEDURE — 80053 COMPREHEN METABOLIC PANEL: CPT

## 2022-10-21 PROCEDURE — 84100 ASSAY OF PHOSPHORUS: CPT

## 2022-10-21 PROCEDURE — 82248 BILIRUBIN DIRECT: CPT

## 2022-10-21 PROCEDURE — 80061 LIPID PANEL: CPT

## 2022-10-21 PROCEDURE — 82043 UR ALBUMIN QUANTITATIVE: CPT

## 2022-10-21 PROCEDURE — 83036 HEMOGLOBIN GLYCOSYLATED A1C: CPT

## 2022-10-21 PROCEDURE — 36415 COLL VENOUS BLD VENIPUNCTURE: CPT

## 2022-11-19 ENCOUNTER — E-VISIT (OUTPATIENT)
Dept: FAMILY MEDICINE | Facility: OTHER | Age: 62
End: 2022-11-19
Payer: COMMERCIAL

## 2022-11-19 DIAGNOSIS — J01.01 ACUTE RECURRENT MAXILLARY SINUSITIS: ICD-10-CM

## 2022-11-19 DIAGNOSIS — R09.81 NASAL CONGESTION: Primary | ICD-10-CM

## 2022-11-19 PROCEDURE — 99207 PR NON-BILLABLE SERV PER CHARTING: CPT | Performed by: PHYSICIAN ASSISTANT

## 2022-11-21 RX ORDER — FLUTICASONE PROPIONATE 50 MCG
1-2 SPRAY, SUSPENSION (ML) NASAL DAILY
Qty: 16 G | Refills: 11 | Status: SHIPPED | OUTPATIENT
Start: 2022-11-21

## 2022-11-21 RX ORDER — CETIRIZINE HYDROCHLORIDE 10 MG/1
10 TABLET ORAL DAILY
Qty: 90 TABLET | Refills: 0 | Status: SHIPPED | OUTPATIENT
Start: 2022-11-21

## 2022-11-21 NOTE — PATIENT INSTRUCTIONS
The symptoms you describe suggest a viral cause, which is much more common than a bacterial cause. Antibiotics will treat bacterial infections, but have no effect on viral infections. If possible, especially if improving, start with symptom care for the first 7-10 days, then consider seeking further treatment or taking an antibiotic. Bacterial infections generally are more severe, including symptoms such as pus, fever over 101degrees F, or rapidly worsening.  Thank you for choosing us for your care. I think an in-clinic visit would be best next steps based on your symptoms. Please schedule a clinic appointment; you won t be charged for this eVisit.      You can schedule an appointment right here in [employer], or call [phone]

## 2022-11-26 ENCOUNTER — HEALTH MAINTENANCE LETTER (OUTPATIENT)
Age: 62
End: 2022-11-26

## 2022-11-28 ENCOUNTER — E-VISIT (OUTPATIENT)
Dept: FAMILY MEDICINE | Facility: CLINIC | Age: 62
End: 2022-11-28
Payer: COMMERCIAL

## 2022-11-28 ENCOUNTER — NURSE TRIAGE (OUTPATIENT)
Dept: FAMILY MEDICINE | Facility: CLINIC | Age: 62
End: 2022-11-28

## 2022-11-28 DIAGNOSIS — Z53.9 ERRONEOUS ENCOUNTER--DISREGARD: Primary | ICD-10-CM

## 2022-11-28 NOTE — TELEPHONE ENCOUNTER
Patient requesting a medication for sinus infection. RN advised patient to try an e-visit or offered to get patient scheduled on VU schedule, as there are no appointments available in clinic today.     Patient was going to try logging into [employer] but was unsure if he would be able to. RN advised him to call back if unable to log in and we can schedule him on VU schedule or see if there is an appointment available in clinic at that time.     MAUDE Aldrich, RN       Reason for Disposition    Sinus congestion (pressure, fullness) present > 10 days    Protocols used: SINUS PAIN OR CONGESTION-A-OH

## 2022-11-30 ENCOUNTER — VIRTUAL VISIT (OUTPATIENT)
Dept: FAMILY MEDICINE | Facility: CLINIC | Age: 62
End: 2022-11-30
Payer: COMMERCIAL

## 2022-11-30 ENCOUNTER — TELEPHONE (OUTPATIENT)
Dept: FAMILY MEDICINE | Facility: CLINIC | Age: 62
End: 2022-11-30

## 2022-11-30 DIAGNOSIS — J01.00 ACUTE NON-RECURRENT MAXILLARY SINUSITIS: Primary | ICD-10-CM

## 2022-11-30 PROCEDURE — 99213 OFFICE O/P EST LOW 20 MIN: CPT | Mod: TEL | Performed by: FAMILY MEDICINE

## 2022-11-30 NOTE — TELEPHONE ENCOUNTER
Patient is scheduled for a telephone visit this afternoon, patient did not want to come in to the clinic.    Hanna Williamson XRO/

## 2022-11-30 NOTE — TELEPHONE ENCOUNTER
Patient had submitted an e-visit to Dr. White on 11/28/22 for sinus infection concerns.     E-visit shows it was completed by provider, but no response seen in chart.     Will route to provider to send response to patient.     Rea BUIN, RN  [facility]

## 2022-11-30 NOTE — PROGRESS NOTES
"Enmanuel is a 62 year old who is being evaluated via a billable telephone visit.      How would you like to obtain your AVS? MyChart      Assessment & Plan     Acute non-recurrent maxillary sinusitis  Been going on for a couple of weeks.  Called in and had an Evisit  over a week ago and they were treating him for viral with just Zyrtec and Flonase.  Told him to contact us if getting worse.  But it is gotten worse he has more pain pressure purulent drainage we will call in an antibiotic for him with Augmentin 875 twice daily continue to take the other medications and ibuprofen for pain follow-up if not improving.  - amoxicillin-clavulanate (AUGMENTIN) 875-125 MG tablet; Take 1 tablet by mouth 2 times daily for 10 days             BMI:   Estimated body mass index is 28.17 kg/m  as calculated from the following:    Height as of 5/19/22: 1.803 m (5' 11\").    Weight as of 5/19/22: 91.6 kg (202 lb).           No follow-ups on file.    Horace White MD  [facility]   Enmanuel is a 62 year old, presenting for the following health issues:  Sinus Problem (Blowing nose there is blood)      Sinus Problem     History of Present Illness       Reason for visit:  Sinus infection  Symptom onset:  1-2 weeks ago  Symptoms include:  Blowing nose there is blood teeth pain eye balls painfull  Symptom intensity:  Mild  Symptom progression:  Staying the same  Had these symptoms before:  Yes  Has tried/received treatment for these symptoms:  Yes  Previous treatment was successful:  Yes  Prior treatment description:  Antibiotic  What makes it worse:  No  What makes it better:  Advil a little    He eats 2-3 servings of fruits and vegetables daily.He consumes 1 sweetened beverage(s) daily.He exercises with enough effort to increase his heart rate 60 or more minutes per day.  He exercises with enough effort to increase his heart rate 4 days per week.   He is taking medications regularly.             Review of " Systems   Constitutional, HEENT, cardiovascular, pulmonary, gi and gu systems are negative, except as otherwise noted.      Objective           Vitals:  No vitals were obtained today due to virtual visit.    Physical Exam   No physical exam was done today as this was a telephone virtual visit.      Do not feel comfortable billing him for this visit.  he was told to contact us back if he was not doing better for consideration of review          Telephone time 2 minutes

## 2023-04-12 ENCOUNTER — LAB (OUTPATIENT)
Dept: LAB | Facility: CLINIC | Age: 63
End: 2023-04-12
Payer: COMMERCIAL

## 2023-04-12 DIAGNOSIS — E78.2 MIXED HYPERLIPIDEMIA: ICD-10-CM

## 2023-04-12 DIAGNOSIS — Z79.4 ENCOUNTER FOR LONG-TERM (CURRENT) USE OF INSULIN (H): Primary | ICD-10-CM

## 2023-04-12 LAB
CHOLEST SERPL-MCNC: 198 MG/DL
HBA1C MFR BLD: 8.8 %
HDLC SERPL-MCNC: 81 MG/DL
LDLC SERPL CALC-MCNC: 102 MG/DL
NONHDLC SERPL-MCNC: 117 MG/DL
TRIGL SERPL-MCNC: 75 MG/DL

## 2023-04-12 PROCEDURE — 36415 COLL VENOUS BLD VENIPUNCTURE: CPT

## 2023-04-12 PROCEDURE — 80061 LIPID PANEL: CPT

## 2023-04-12 PROCEDURE — 83036 HEMOGLOBIN GLYCOSYLATED A1C: CPT

## 2023-10-28 ENCOUNTER — HEALTH MAINTENANCE LETTER (OUTPATIENT)
Age: 63
End: 2023-10-28

## 2023-11-10 ENCOUNTER — LAB (OUTPATIENT)
Dept: LAB | Facility: CLINIC | Age: 63
End: 2023-11-10
Payer: COMMERCIAL

## 2023-11-10 DIAGNOSIS — E10.9 DIABETES MELLITUS TYPE I (H): Primary | ICD-10-CM

## 2023-11-10 DIAGNOSIS — E78.2 MIXED HYPERLIPIDEMIA: ICD-10-CM

## 2023-11-10 LAB
ALBUMIN SERPL BCG-MCNC: 4.4 G/DL (ref 3.5–5.2)
ANION GAP SERPL CALCULATED.3IONS-SCNC: 11 MMOL/L (ref 7–15)
BUN SERPL-MCNC: 18.1 MG/DL (ref 8–23)
CALCIUM SERPL-MCNC: 9.6 MG/DL (ref 8.8–10.2)
CHLORIDE SERPL-SCNC: 102 MMOL/L (ref 98–107)
CHOLEST SERPL-MCNC: 221 MG/DL
CREAT SERPL-MCNC: 0.83 MG/DL (ref 0.67–1.17)
CREAT UR-MCNC: 178.3 MG/DL
DEPRECATED HCO3 PLAS-SCNC: 25 MMOL/L (ref 22–29)
EGFRCR SERPLBLD CKD-EPI 2021: >90 ML/MIN/1.73M2
GLUCOSE SERPL-MCNC: 199 MG/DL (ref 70–99)
HBA1C MFR BLD: 8.8 %
HDLC SERPL-MCNC: 91 MG/DL
LDLC SERPL CALC-MCNC: 112 MG/DL
MICROALBUMIN UR-MCNC: 14.6 MG/L
MICROALBUMIN/CREAT UR: 8.19 MG/G CR (ref 0–17)
NONHDLC SERPL-MCNC: 130 MG/DL
PHOSPHATE SERPL-MCNC: 3.3 MG/DL (ref 2.5–4.5)
POTASSIUM SERPL-SCNC: 4.6 MMOL/L (ref 3.4–5.3)
SODIUM SERPL-SCNC: 138 MMOL/L (ref 135–145)
TRIGL SERPL-MCNC: 88 MG/DL

## 2023-11-10 PROCEDURE — 36415 COLL VENOUS BLD VENIPUNCTURE: CPT

## 2023-11-10 PROCEDURE — 82043 UR ALBUMIN QUANTITATIVE: CPT

## 2023-11-10 PROCEDURE — 82570 ASSAY OF URINE CREATININE: CPT

## 2023-11-10 PROCEDURE — 83036 HEMOGLOBIN GLYCOSYLATED A1C: CPT

## 2023-11-10 PROCEDURE — 80069 RENAL FUNCTION PANEL: CPT

## 2023-11-10 PROCEDURE — 80061 LIPID PANEL: CPT

## 2023-12-27 ENCOUNTER — OFFICE VISIT (OUTPATIENT)
Dept: FAMILY MEDICINE | Facility: CLINIC | Age: 63
End: 2023-12-27
Payer: COMMERCIAL

## 2023-12-27 VITALS
HEART RATE: 83 BPM | DIASTOLIC BLOOD PRESSURE: 68 MMHG | SYSTOLIC BLOOD PRESSURE: 120 MMHG | HEIGHT: 71 IN | RESPIRATION RATE: 12 BRPM | OXYGEN SATURATION: 98 % | BODY MASS INDEX: 28 KG/M2 | WEIGHT: 200 LBS | TEMPERATURE: 97.4 F

## 2023-12-27 DIAGNOSIS — Z28.21 IMMUNIZATION DECLINED: ICD-10-CM

## 2023-12-27 DIAGNOSIS — M25.842 CYST OF JOINT OF LEFT HAND: ICD-10-CM

## 2023-12-27 DIAGNOSIS — M65.332 TRIGGER MIDDLE FINGER OF LEFT HAND: Primary | ICD-10-CM

## 2023-12-27 PROCEDURE — 99213 OFFICE O/P EST LOW 20 MIN: CPT | Performed by: NURSE PRACTITIONER

## 2023-12-27 ASSESSMENT — PAIN SCALES - GENERAL: PAINLEVEL: NO PAIN (0)

## 2023-12-27 NOTE — PROGRESS NOTES
"  Assessment & Plan     Enmanuel presents to clinic today with concerns of decreased strength and range of motion in his left hand. We discussed his presentation is most consistent with trigger finger and concern of a ganglion cyst in the palmar surface of the hand, likely contributing to his decreased ROM in the the third and fourth digits. Referral to Orthopedics placed. We discussed using Tylenol or NSAIDs as needed for pain, stretching and ROM activities as tolerated. He will follow-up with Ortho, or in clinic if any new or worsening symptoms, questions or concerns.     Trigger middle finger of left hand  - Orthopedic  Referral; Future    Cyst of joint of left hand  - Orthopedic  Referral; Future    Type 1 Diabetes  Not currently established with primary care provider in clinic, he will schedule establish care and annual visit before leaving today.     Immunization declined  Reviewed recommendations for Tdap, RSV and Pneumococcal vaccinations today, patient declined.     I explained my diagnostic considerations and recommendations to the patient, who voiced understanding and agreement with the assessment and treatment plan. All questions were answered to patient's apparent satisfaction. We discussed potential side effects of any prescribed or recommended therapies, as well as expectations for response to treatments and importance of lifestyle measures that may improve symptoms. Patient was advised to contact our office if there are new symptoms or no improvement or worsening of conditions or symptoms.               BMI:   Estimated body mass index is 27.89 kg/m  as calculated from the following:    Height as of this encounter: 1.803 m (5' 11\").    Weight as of this encounter: 90.7 kg (200 lb).           Adela Sidhu NP  [facility]   Enmanuel is a 63 year old, presenting for the following health issues:  Musculoskeletal Problem (Numbness in left hand )      " "12/27/2023     7:46 AM   Additional Questions   Roomed by Luiz Medrano CMA       History of Present Illness       Reason for visit:  Hand pain and numbness  Symptom onset:  More than a month  Symptoms include:  Pain and numbness  Symptom intensity:  Moderate  Symptom progression:  Worsening    He eats 2-3 servings of fruits and vegetables daily.He consumes 0 sweetened beverage(s) daily.He exercises with enough effort to increase his heart rate 9 or less minutes per day.  He exercises with enough effort to increase his heart rate 3 or less days per week.   He is taking medications regularly.     Enmanuel is here today with concerns of pain and difficulty grasping with his left hand. He has noticed over the past several months, a hard mass form on the palmar surface of his left hand, just below the middle finger. He has also noticed difficulty straightening and flexing his middle and ring fingers. He denies constant pain with this but does have some pain when trying to make a fist. He denies any known injury or trauma, he denies any redness, swelling or fever. He has been taking ibuprofen as needed for his symptoms. He denies any additional concerns today.     Patient Active Problem List   Diagnosis    TYPE 1 DIABETES, HBA1C GOAL < 7%    CARDIOVASCULAR SCREENING; LDL GOAL LESS THAN 100    Hypertension goal BP (blood pressure) < 140/90    C. difficile colitis    Hyperlipidemia LDL goal <100     Current Outpatient Medications   Medication    aspirin 81 MG tablet    blood glucose (NO BRAND SPECIFIED) test strip    Insulin Aspart (NOVOLOG SC)    INSULIN SYRINGE-NEEDLE U-100 MISC 0.25 ML 29 X 1/2\"    LISINOPRIL 5 MG OR TABS    multivitamin w/minerals (THERA-VIT-M) tablet    SIMVASTATIN PO    cetirizine (ZYRTEC) 10 MG tablet    fluticasone (FLONASE) 50 MCG/ACT nasal spray     No current facility-administered medications for this visit.         Review of Systems   Constitutional, HEENT, cardiovascular, pulmonary, gi and gu " "systems are negative, except as otherwise noted.      Objective    /68 (BP Location: Left arm, Patient Position: Chair, Cuff Size: Adult Large)   Pulse 83   Temp 97.4  F (36.3  C) (Temporal)   Resp 12   Ht 1.803 m (5' 11\")   Wt 90.7 kg (200 lb)   SpO2 98%   BMI 27.89 kg/m    Body mass index is 27.89 kg/m .  Physical Exam  Vitals reviewed.   Constitutional:       General: He is not in acute distress.     Appearance: Normal appearance.   HENT:      Head: Normocephalic and atraumatic.   Pulmonary:      Effort: Pulmonary effort is normal.   Musculoskeletal:      Right hand: Normal.      Left hand: Deformity and bony tenderness present. Decreased range of motion. Decreased strength. Normal capillary refill. Normal pulse.      Comments: Palpable pea sized mass on the palmar surface of the left hand, just proximal to the third finger base. Non-mobile, non-tender. Decreased strength when making a fist, inability to fully contract the 3rd and 4th digits. Full extension of the left middle finger unable to be achieved.    Skin:     General: Skin is warm and dry.      Capillary Refill: Capillary refill takes less than 2 seconds.   Neurological:      Mental Status: He is alert and oriented to person, place, and time. Mental status is at baseline.   Psychiatric:         Mood and Affect: Mood normal.         Behavior: Behavior normal.                              "

## 2024-01-02 NOTE — TELEPHONE ENCOUNTER
DIAGNOSIS: Trigger middle finger of left hand [M65.332]  Cyst of joint of left hand    APPOINTMENT DATE: 01/09/2024   NOTES STATUS DETAILS   OFFICE NOTE from referring provider Internal 12/27/2023 Dr Sidhu [facility]    OFFICE NOTE from other specialist N/A    DISCHARGE SUMMARY from hospital N/A    DISCHARGE REPORT from the ER N/A    OPERATIVE REPORT N/A    MEDICATION LIST N/A    EMG (for Spine) N/A    IMPLANT RECORD/STICKER N/A    LABS     CBC/DIFF N/A    CULTURES N/A    INJECTIONS DONE IN RADIOLOGY N/A    MRI N/A    CT SCAN N/A    XRAYS (IMAGES & REPORTS) N/A    TUMOR     PATHOLOGY  Slides & report N/A

## 2024-01-06 ENCOUNTER — HEALTH MAINTENANCE LETTER (OUTPATIENT)
Age: 64
End: 2024-01-06

## 2024-01-09 ENCOUNTER — OFFICE VISIT (OUTPATIENT)
Dept: ORTHOPEDICS | Facility: CLINIC | Age: 64
End: 2024-01-09
Attending: NURSE PRACTITIONER
Payer: COMMERCIAL

## 2024-01-09 ENCOUNTER — PRE VISIT (OUTPATIENT)
Dept: ORTHOPEDICS | Facility: CLINIC | Age: 64
End: 2024-01-09

## 2024-01-09 DIAGNOSIS — M25.842 CYST OF JOINT OF LEFT HAND: ICD-10-CM

## 2024-01-09 DIAGNOSIS — M65.332 TRIGGER MIDDLE FINGER OF LEFT HAND: ICD-10-CM

## 2024-01-09 PROCEDURE — 20550 NJX 1 TENDON SHEATH/LIGAMENT: CPT | Mod: F2 | Performed by: PLASTIC SURGERY

## 2024-01-09 RX ORDER — TRIAMCINOLONE ACETONIDE 40 MG/ML
40 INJECTION, SUSPENSION INTRA-ARTICULAR; INTRAMUSCULAR
Status: SHIPPED | OUTPATIENT
Start: 2024-01-09

## 2024-01-09 RX ADMIN — TRIAMCINOLONE ACETONIDE 40 MG: 40 INJECTION, SUSPENSION INTRA-ARTICULAR; INTRAMUSCULAR at 14:16

## 2024-01-09 NOTE — PROGRESS NOTES
"Referring Provider:  Adela Sidhu, NIA  [address] [place] [address]     Primary Care Provider:  No Ref-Primary, Physician      RE: Enmanuel Mckeon.  : 1960.  NEIL: 2024.    Reason for visit: Left long finger triggering    HPI: The patient is right-handed.  For the last few months has been unable to fully flex his left hand mainly because of pain in the left long finger.  He cannot recall if it ever triggered, click, or got stuck.  He is a difficult historian.  Is progressively getting worse.    Medical history:  Past Medical History:   Diagnosis Date    Diabetic eye exam (H) 11    Diabetic eye exam (H) 12, 14    Diabetic eye exam (H) 1/23/15    Essential hypertension, benign     Type I (juvenile type) diabetes mellitus without mention of complication, not stated as uncontrolled        Surgical history:  Past Surgical History:   Procedure Laterality Date    COLONOSCOPY N/A 2018    Procedure: COLONOSCOPY WITH FORCEP BIOPSIES;  Surgeon: Enmanuel Gerber DO;  Location: [facility] COLONOSCOPY W/WO BRUSH/WASH  09       Family history:  Family History   Problem Relation Age of Onset    Alcohol/Drug Sister     Diabetes Father     Heart Disease Father         4 way bypass    Neurologic Disorder Mother         anurism       Medications:  Current Outpatient Medications   Medication Sig Dispense Refill    aspirin 81 MG tablet Take 2 tablets (162 mg) by mouth daily 100 tablet 3    blood glucose (NO BRAND SPECIFIED) test strip 5 times daily 100 each 0    cetirizine (ZYRTEC) 10 MG tablet Take 1 tablet (10 mg) by mouth daily (Patient not taking: Reported on 2022) 90 tablet 0    fluticasone (FLONASE) 50 MCG/ACT nasal spray Spray 1-2 sprays into both nostrils daily (Patient not taking: Reported on 2023) 16 g 11    Insulin Aspart (NOVOLOG SC)       INSULIN SYRINGE-NEEDLE U-100 MISC 0.25 ML 29 X 1/2\"  insulin pump      LISINOPRIL 5 MG OR TABS 1 1/2 TABLET " DAILY 30 0    multivitamin w/minerals (THERA-VIT-M) tablet Take 1 tablet by mouth daily 100 tablet 3    SIMVASTATIN PO          Allergies:  No Known Allergies    Social history:   Social History     Tobacco Use    Smoking status: Former    Smokeless tobacco: Former    Tobacco comments:     quite 20 years ago   Substance Use Topics    Alcohol use: Yes     Alcohol/week: 0.0 standard drinks of alcohol     Comment: occasional         Physical Examination:  There were no vitals taken for this visit.  There is no height or weight on file to calculate BMI.    General: No acute distress.    He is acute tenderness over the MP joint of the long finger on the left side consistent with a trigger finger.  He has no pain or tenderness in line with the index ring or little fingers.        ASSESMENT and PLAN:     Based upon the above findings, a diagnosis of left long finger triggering was made.  I had a emilio, detailed discussion with the patient, in the presence of my nurse, who was present from beginning to end.  I discussed with the patient the pathophysiology behind the problem, the concept behind the procedure/treatment proposed, expectations of the planned procedure(s), and all grant-operative steps.     I discussed with the patient that we should inject his left long finger tendon sheath with steroids in an effort to try and treat his trigger finger.  Hopefully once the pain in this area subsides and he is able to move his finger completely the other fingers will also be able to fully flex and it will improve his fist making.  I described to him the risks of a steroid injection including pain, infection, bleeding, tenosynovitis, injury to deeper structures, recurrence, requirement of further treatments, skin changes like skin atrophy, hypopigmentation, and telangiectasias.  He understood the mole and wants to proceed.    PROCEDURE NOTE: After informed consent was taken from the patient and the proper site was confirmed, he  was taken to the procedure room.  His left hand was prepped and draped in the standard surgical fashion.  I then injected 1 cc of Kenalog 40 mixed with 1 cc of 1% lidocaine in the left long finger tendon sheath.  He tolerated the procedure well.    I will now see him back in about 4 to 6 weeks to monitor his progress.    All questions were answered. The patient was happy with the visit. I look forward to helping the patient out in the near future as indicated.       Total time spent in the encounter today including chart review, visit itself, and post-visit paperwork was 45 minutes.       Aparna Kern MD    Chief, Division of Plastic Surgery  Department of Surgery  [facility]      CC: Adela Sidhu, NIA  [address] [place] [address]  CC: No Ref-Primary, Physician

## 2024-01-09 NOTE — LETTER
2024         RE: Enmanuel Mckeon  [address]  [facility] [address]        Dear Colleague,    Thank you for referring your patient, Enmanuel Mckeon, to the [facility]. Please see a copy of my visit note below.        Hand / Upper Extremity Injection/Arthrocentesis: L long A1    Date/Time: 2024 2:16 PM    Performed by: TIFFANY Kern MD  Authorized by: TIFFANY Kern MD    Indications:  Pain  Needle Size:  25 G  Guidance: landmark    Condition: trigger finger    Location:  Long finger    Site:  L long A1  Medications:  40 mg triamcinolone 40 MG/ML  Outcome:  Tolerated well, no immediate complications  Procedure discussed: discussed risks, benefits, and alternatives    Consent Given by:  Patient  Timeout: timeout called immediately prior to procedure    Prep: patient was prepped and draped in usual sterile fashion          Referring Provider:  Adela Sidhu, NIA  [address] [place] [address]     Primary Care Provider:  No Ref-Primary, Physician      RE: Enmanuel Mckeon.  : 1960.  NEIL: 2024.    Reason for visit: Left long finger triggering    HPI: The patient is right-handed.  For the last few months has been unable to fully flex his left hand mainly because of pain in the left long finger.  He cannot recall if it ever triggered, click, or got stuck.  He is a difficult historian.  Is progressively getting worse.    Medical history:  Past Medical History:   Diagnosis Date     Diabetic eye exam (H) 11     Diabetic eye exam (H) 12, 14     Diabetic eye exam (H) 1/23/15     Essential hypertension, benign      Type I (juvenile type) diabetes mellitus without mention of complication, not stated as uncontrolled        Surgical history:  Past Surgical History:   Procedure Laterality Date     COLONOSCOPY N/A 2018    Procedure: COLONOSCOPY WITH FORCEP BIOPSIES;  Surgeon: Enmanuel Gerber DO;  Location:  GI      "ZZ COLONOSCOPY W/WO BRUSH/WASH  01/27/09       Family history:  Family History   Problem Relation Age of Onset     Alcohol/Drug Sister      Diabetes Father      Heart Disease Father         4 way bypass     Neurologic Disorder Mother         anurism       Medications:  Current Outpatient Medications   Medication Sig Dispense Refill     aspirin 81 MG tablet Take 2 tablets (162 mg) by mouth daily 100 tablet 3     blood glucose (NO BRAND SPECIFIED) test strip 5 times daily 100 each 0     cetirizine (ZYRTEC) 10 MG tablet Take 1 tablet (10 mg) by mouth daily (Patient not taking: Reported on 11/30/2022) 90 tablet 0     fluticasone (FLONASE) 50 MCG/ACT nasal spray Spray 1-2 sprays into both nostrils daily (Patient not taking: Reported on 12/27/2023) 16 g 11     Insulin Aspart (NOVOLOG SC)        INSULIN SYRINGE-NEEDLE U-100 MISC 0.25 ML 29 X 1/2\"  insulin pump       LISINOPRIL 5 MG OR TABS 1 1/2 TABLET DAILY 30 0     multivitamin w/minerals (THERA-VIT-M) tablet Take 1 tablet by mouth daily 100 tablet 3     SIMVASTATIN PO          Allergies:  No Known Allergies    Social history:   Social History     Tobacco Use     Smoking status: Former     Smokeless tobacco: Former     Tobacco comments:     quite 20 years ago   Substance Use Topics     Alcohol use: Yes     Alcohol/week: 0.0 standard drinks of alcohol     Comment: occasional         Physical Examination:  There were no vitals taken for this visit.  There is no height or weight on file to calculate BMI.    General: No acute distress.    He is acute tenderness over the MP joint of the long finger on the left side consistent with a trigger finger.  He has no pain or tenderness in line with the index ring or little fingers.        ASSESMENT and PLAN:     Based upon the above findings, a diagnosis of left long finger triggering was made.  I had a emilio, detailed discussion with the patient, in the presence of my nurse, who was present from beginning to end.  I discussed with " the patient the pathophysiology behind the problem, the concept behind the procedure/treatment proposed, expectations of the planned procedure(s), and all grant-operative steps.     I discussed with the patient that we should inject his left long finger tendon sheath with steroids in an effort to try and treat his trigger finger.  Hopefully once the pain in this area subsides and he is able to move his finger completely the other fingers will also be able to fully flex and it will improve his fist making.  I described to him the risks of a steroid injection including pain, infection, bleeding, tenosynovitis, injury to deeper structures, recurrence, requirement of further treatments, skin changes like skin atrophy, hypopigmentation, and telangiectasias.  He understood the mole and wants to proceed.    PROCEDURE NOTE: After informed consent was taken from the patient and the proper site was confirmed, he was taken to the procedure room.  His left hand was prepped and draped in the standard surgical fashion.  I then injected 1 cc of Kenalog 40 mixed with 1 cc of 1% lidocaine in the left long finger tendon sheath.  He tolerated the procedure well.    I will now see him back in about 4 to 6 weeks to monitor his progress.    All questions were answered. The patient was happy with the visit. I look forward to helping the patient out in the near future as indicated.       Total time spent in the encounter today including chart review, visit itself, and post-visit paperwork was 45 minutes.       Aparna Kern MD    Chief, Division of Plastic Surgery  Department of Surgery  [facility]      CC: Adela Sidhu, NP  [address] [place] [address]  CC: No Ref-Primary, Physician      Again, thank you for allowing me to participate in the care of your patient.        Sincerely,        TIFFANY Kern MD

## 2024-01-09 NOTE — NURSING NOTE
[facility]   ORTHOPEDICS & SPORTS MEDICINE  [address] [place] [address]  Dept: [phone]  ______________________________________________________________________________    Patient: Enmanuel Mckeon   : 1960   MRN: [id number]   2024    INVASIVE PROCEDURE SAFETY CHECKLIST    Date: 2024   Procedure: Left trigger finger injection   Patient Name: Enmanuel Mckeon  MRN: [id number]  YOB: 1960    Action: Complete sections as appropriate. Any discrepancy results in a HARD COPY until resolved.     PRE PROCEDURE:  Patient ID verified with 2 identifiers (name and  or MRN): Yes  Procedure and site verified with patient/designee (when able): Yes  Accurate consent documentation in medical record: Yes  H&P (or appropriate assessment) documented in medical record: NA  H&P must be up to 20 days prior to procedure and updates within 24 hours of procedure as applicable: NA  Relevant diagnostic and radiology test results appropriately labeled and displayed as applicable: Yes  Procedure site(s) marked with provider initials: Yes    TIMEOUT:  Time-Out performed immediately prior to starting procedure, including verbal and active participation of all team members addressing the following:Yes  * Correct patient identify  * Confirmed that the correct side and site are marked  * An accurate procedure consent form  * Agreement on the procedure to be done  * Correct patient position  * Relevant images and results are properly labeled and appropriately displayed  * The need to administer antibiotics or fluids for irrigation purposes during the procedure as applicable   * Safety precautions based on patient history or medication use    DURING PROCEDURE: Verification of correct person, site, and procedures any time the responsibility for care of the patient is transferred to another member of the care team.       Prior to injection, verified patient identity using patient's name  and date of birth.  Due to injection administration, patient instructed to remain in clinic for 15 minutes  afterwards, and to report any adverse reaction to me immediately.    Tendon sheath injection was performed.     Drug Amount Wasted:  None.  Vial/Syringe: Single dose vial  Expiration Date:  8/30/2025      Sonya Jackson, [facility]  January 9, 2024

## 2024-01-22 ENCOUNTER — E-VISIT (OUTPATIENT)
Dept: FAMILY MEDICINE | Facility: CLINIC | Age: 64
End: 2024-01-22
Payer: COMMERCIAL

## 2024-01-22 DIAGNOSIS — J02.9 SORE THROAT: Primary | ICD-10-CM

## 2024-01-22 PROCEDURE — 99207 PR NON-BILLABLE SERV PER CHARTING: CPT | Performed by: NURSE PRACTITIONER

## 2024-01-22 NOTE — PATIENT INSTRUCTIONS
Dear Enmanuel Mckeon,    We are sorry you are not feeling well. Based on the responses you provided, it is recommended that you be seen in-person in urgent care so we can better evaluate your symptoms. Please click here to find the nearest urgent care location to you.   You will not be charged for this Visit. Thank you for trusting us with your care.    Adela Sidhu NP

## 2024-01-22 NOTE — TELEPHONE ENCOUNTER
Provider E-Visit time total (minutes): Less than 5 minutes. Needs to be seen in-person, urgent care or clinic.

## 2024-01-25 ENCOUNTER — ALLIED HEALTH/NURSE VISIT (OUTPATIENT)
Dept: FAMILY MEDICINE | Facility: CLINIC | Age: 64
End: 2024-01-25
Payer: COMMERCIAL

## 2024-01-25 VITALS — HEART RATE: 79 BPM | DIASTOLIC BLOOD PRESSURE: 88 MMHG | SYSTOLIC BLOOD PRESSURE: 137 MMHG

## 2024-01-25 DIAGNOSIS — I10 HTN (HYPERTENSION): Primary | ICD-10-CM

## 2024-01-25 PROCEDURE — 99207 PR NO CHARGE LOS: CPT

## 2024-01-25 RX ORDER — LISINOPRIL 40 MG/1
20 TABLET ORAL DAILY
COMMUNITY
Start: 2023-11-29

## 2024-01-25 NOTE — PROGRESS NOTES
Patient taking lisinopril 50 mg daily, he was increased by the endocrinologist. Dr Hinton was notified so he can decide about the DOT card

## 2024-02-06 ENCOUNTER — OFFICE VISIT (OUTPATIENT)
Dept: INTERNAL MEDICINE | Facility: CLINIC | Age: 64
End: 2024-02-06
Attending: NURSE PRACTITIONER
Payer: COMMERCIAL

## 2024-02-06 VITALS
RESPIRATION RATE: 16 BRPM | OXYGEN SATURATION: 97 % | SYSTOLIC BLOOD PRESSURE: 118 MMHG | TEMPERATURE: 98.1 F | WEIGHT: 193 LBS | HEART RATE: 90 BPM | DIASTOLIC BLOOD PRESSURE: 78 MMHG | BODY MASS INDEX: 27.63 KG/M2 | HEIGHT: 70 IN

## 2024-02-06 DIAGNOSIS — Z12.5 SCREENING FOR PROSTATE CANCER: ICD-10-CM

## 2024-02-06 DIAGNOSIS — Z12.11 SCREEN FOR COLON CANCER: ICD-10-CM

## 2024-02-06 DIAGNOSIS — Z00.00 ENCOUNTER FOR ROUTINE ADULT HEALTH EXAMINATION WITHOUT ABNORMAL FINDINGS: Primary | ICD-10-CM

## 2024-02-06 DIAGNOSIS — E78.5 HYPERLIPIDEMIA LDL GOAL <100: ICD-10-CM

## 2024-02-06 DIAGNOSIS — I10 HYPERTENSION GOAL BP (BLOOD PRESSURE) < 140/90: ICD-10-CM

## 2024-02-06 DIAGNOSIS — E10.9 TYPE 1 DIABETES, HBA1C GOAL < 7% (H): ICD-10-CM

## 2024-02-06 PROCEDURE — 90677 PCV20 VACCINE IM: CPT | Performed by: INTERNAL MEDICINE

## 2024-02-06 PROCEDURE — 90471 IMMUNIZATION ADMIN: CPT | Performed by: INTERNAL MEDICINE

## 2024-02-06 PROCEDURE — 99207 PR FOOT EXAM NO CHARGE: CPT | Performed by: INTERNAL MEDICINE

## 2024-02-06 PROCEDURE — 99396 PREV VISIT EST AGE 40-64: CPT | Mod: 25 | Performed by: INTERNAL MEDICINE

## 2024-02-06 RX ORDER — SIMVASTATIN 40 MG
40 TABLET ORAL AT BEDTIME
COMMUNITY
Start: 2024-02-06

## 2024-02-06 SDOH — HEALTH STABILITY: PHYSICAL HEALTH: ON AVERAGE, HOW MANY DAYS PER WEEK DO YOU ENGAGE IN MODERATE TO STRENUOUS EXERCISE (LIKE A BRISK WALK)?: 2 DAYS

## 2024-02-06 ASSESSMENT — SOCIAL DETERMINANTS OF HEALTH (SDOH): HOW OFTEN DO YOU GET TOGETHER WITH FRIENDS OR RELATIVES?: TWICE A WEEK

## 2024-02-06 NOTE — PROGRESS NOTES
"Preventive Care Visit  [facility]  Aaron Molina MD, Internal Medicine  Feb 6, 2024    Assessment & Plan   Problem List Items Addressed This Visit       TYPE 1 DIABETES, HBA1C GOAL < 7%    Hypertension goal BP (blood pressure) < 140/90    Hyperlipidemia LDL goal <100    Relevant Medications    simvastatin (ZOCOR) 40 MG tablet     Other Visit Diagnoses       Encounter for routine adult health examination without abnormal findings    -  Primary    Relevant Orders    Hepatic panel (Albumin, ALT, AST, Bili, Alk Phos, TP)    Screen for colon cancer        Relevant Orders    Colonoscopy Screening  Referral    Screening for prostate cancer        Relevant Orders    PSA, screen           Patient here to establish care.  His primary retired.  He does follow closely with endocrinology Dr. Gardner for type 1 diabetes for many years.    We will get a screening colonoscopy for him with a history of heart colon polyps    Get a screening PSA for him.    Type 1 diabetes on insulin pump recommended he get back on his sensor.  He will work with endocrinology, A1c was 8.8 last time.  Diabetic foot exam was okay without any neuropathy.    Hyperlipidemia on simvastatin    Hypertension lisinopril and another medication from endocrinology doing well.    He does have a Dupuytren's contracture and needed surgery he is cleared as a preop today.  He will hold his aspirin, hold his lisinopril on the day of surgery and suspend his pump for the operation.  He has no symptoms and is approved for this low risk surgery.    Discussed immunizations he is up-to-date, he can get a Tdap at the pharmacy otherwise doing well we will update his pneumonia shot today.                   BMI  Estimated body mass index is 27.69 kg/m  as calculated from the following:    Height as of this encounter: 1.778 m (5' 10\").    Weight as of this encounter: 87.5 kg (193 lb).       Counseling  Appropriate preventive services were " discussed with this patient, including applicable screening as appropriate for fall prevention, nutrition, physical activity, Tobacco-use cessation, weight loss and cognition.  Checklist reviewing preventive services available has been given to the patient.  Reviewed patient's diet, addressing concerns and/or questions.   He is at risk for lack of exercise and has been provided with information to increase physical activity for the benefit of his well-being.   He is at risk for psychosocial distress and has been provided with information to reduce risk.       FUTURE APPOINTMENTS:       - Follow-up visit in 1 year       Sharon Singer is a 63 year old, presenting for the following:  Physical        2/6/2024     8:33 AM   Additional Questions   Roomed by [facility] Care Directive  Patient does not have a Health Care Directive or Living Will: Discussed advance care planning with patient; information given to patient to review.    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     Left hand trigger finger had cortisone shot with orthopedics, going back today.     Trip to [place] in mid April    Needs colonoscopy,     Type 1 diabetes for years since 1985, insulin pump. Sees Dr De Paz.      DOT physical         2/6/2024   General Health   How would you rate your overall physical health? Good   Feel stress (tense, anxious, or unable to sleep) Only a little   (!) STRESS CONCERN      2/6/2024   Nutrition   Three or more servings of calcium each day? Yes   Diet: Diabetic   How many servings of fruit and vegetables per day? (!) 0-1   How many sweetened beverages each day? 0-1         2/6/2024   Exercise   Days per week of moderate/strenous exercise 2 days   (!) EXERCISE CONCERN      2/6/2024   Social Factors   Frequency of gathering with friends or relatives Twice a week   Worry food won't last until get money to buy more No   Food not last or not have enough money for food? No   Do  you have housing?  Yes   Are you worried about losing your housing? No   Lack of transportation? No   Unable to get utilities (heat,electricity)? No         2/6/2024   Fall Risk   Fallen 2 or more times in the past year? No   Trouble with walking or balance? No          2/6/2024   Dental   Dentist two times every year? Yes         2/6/2024   TB Screening   Were you born outside of [place]?  (!) YES             Today's PHQ-2 Score:       1/9/2024     1:36 PM   PHQ-2 ( 1999 [employer])   Q1: Little interest or pleasure in doing things 0   Q2: Feeling down, depressed or hopeless 0   PHQ-2 Score 0         2/6/2024   Substance Use   Alcohol more than 3/day or more than 7/wk No   Do you use any other substances recreationally? No     Social History     Tobacco Use    Smoking status: Former    Smokeless tobacco: Former    Tobacco comments:     quite 20 years ago   Vaping Use    Vaping Use: Never used   Substance Use Topics    Alcohol use: Yes     Alcohol/week: 0.0 standard drinks of alcohol     Comment: occasional    Drug use: No               2/6/2024   One time HIV Screening   Previous HIV test? No         2/6/2024   STI Screening   New sexual partner(s) since last STI/HIV test? No   Last PSA:   PSA   Date Value Ref Range Status   05/06/2009 0.50 0 - 4 ug/L Final     The 10-year ASCVD risk score (Sukhi JARAMILLO, et al., 2019) is: 14%    Values used to calculate the score:      Age: 63 years      Sex: Male      Is Non- : No      Diabetic: Yes      Tobacco smoker: No      Systolic Blood Pressure: 118 mmHg      Is BP treated: Yes      HDL Cholesterol: 91 mg/dL      Total Cholesterol: 221 mg/dL           Reviewed and updated as needed this visit by Provider                    Review of Systems    Review of Systems  CONSTITUTIONAL: NEGATIVE for fever, chills, change in weight  INTEGUMENTARY/SKIN: NEGATIVE for worrisome rashes, moles or lesions  EYES: NEGATIVE for vision changes or irritation  ENT/MOUTH: NEGATIVE for  "ear, mouth and throat problems  RESP: NEGATIVE for significant cough or SOB  CV: NEGATIVE for chest pain, palpitations or peripheral edema  GI: NEGATIVE for nausea, abdominal pain, heartburn, or change in bowel habits  : NEGATIVE for frequency, dysuria, or hematuria  MUSCULOSKELETAL: NEGATIVE for significant arthralgias or myalgia  NEURO: NEGATIVE for weakness, dizziness or paresthesias  ENDOCRINE: NEGATIVE for temperature intolerance, skin/hair changes  HEME: NEGATIVE for bleeding problems  PSYCHIATRIC: NEGATIVE for changes in mood or affect     Objective    Exam  /78   Pulse 90   Temp 98.1  F (36.7  C) (Temporal)   Resp 16   Ht 1.778 m (5' 10\")   Wt 87.5 kg (193 lb)   SpO2 97%   BMI 27.69 kg/m     Estimated body mass index is 27.69 kg/m  as calculated from the following:    Height as of this encounter: 1.778 m (5' 10\").    Weight as of this encounter: 87.5 kg (193 lb).    Physical Exam  GENERAL: alert and no distress  EYES: Eyes grossly normal to inspection, PERRL and conjunctivae and sclerae normal  HENT: ear canals and TM's normal, nose and mouth without ulcers or lesions  NECK: no adenopathy, no asymmetry, masses, or scars  RESP: lungs clear to auscultation - no rales, rhonchi or wheezes  CV: regular rate and rhythm, normal S1 S2, no S3 or S4, no murmur, click or rub, no peripheral edema  ABDOMEN: soft, nontender, no hepatosplenomegaly, no masses and bowel sounds normal  MS: right hand with contractures  SKIN: no suspicious lesions or rashes  NEURO: Normal strength and tone, mentation intact and speech normal  PSYCH: mentation appears normal, affect normal/bright    Signed Electronically by: Aaron Molina MD    "

## 2024-02-06 NOTE — NURSING NOTE
Prior to immunization administration, verified patients identity using patient s name and date of birth. Please see Immunization Activity for additional information.     Screening Questionnaire for Adult Immunization    Are you sick today?   No   Do you have allergies to medications, food, a vaccine component or latex?   No   Have you ever had a serious reaction after receiving a vaccination?   No   Do you have a long-term health problem with heart, lung, kidney, or metabolic disease (e.g., diabetes), asthma, a blood disorder, no spleen, complement component deficiency, a cochlear implant, or a spinal fluid leak?  Are you on long-term aspirin therapy?   diabetes   Do you have cancer, leukemia, HIV/AIDS, or any other immune system problem?   No   Do you have a parent, brother, or sister with an immune system problem?   No   In the past 3 months, have you taken medications that affect  your immune system, such as prednisone, other steroids, or anticancer drugs; drugs for the treatment of rheumatoid arthritis, Crohn s disease, or psoriasis; or have you had radiation treatments?   No   Have you had a seizure, or a brain or other nervous system problem?   No   During the past year, have you received a transfusion of blood or blood    products, or been given immune (gamma) globulin or antiviral drug?   No   For women: Are you pregnant or is there a chance you could become       pregnant during the next month?   No   Have you received any vaccinations in the past 4 weeks?   No     Immunization questionnaire was positive for at least one answer.  Notified Dr. Molina.      Patient instructed to remain in clinic for 15 minutes afterwards, and to report any adverse reactions.     Screening performed by Kiara Gale MA on 2/6/2024 at 9:22 AM.

## 2024-02-07 ENCOUNTER — TELEPHONE (OUTPATIENT)
Dept: GASTROENTEROLOGY | Facility: CLINIC | Age: 64
End: 2024-02-07

## 2024-02-07 NOTE — TELEPHONE ENCOUNTER
"Endoscopy Scheduling Screen    Have you had a positive Covid test in the last 14 days?  No    Are you active on MyChart?   Yes    What insurance is in the chart?  Other:  blue plus    Ordering/Referring Provider:     VEENA BOGGS      (If ordering provider performs procedure, schedule with ordering provider unless otherwise instructed. )    BMI: Estimated body mass index is 27.69 kg/m  as calculated from the following:    Height as of 2/6/24: 1.778 m (5' 10\").    Weight as of 2/6/24: 87.5 kg (193 lb).     Sedation Ordered  moderate sedation.   If patient BMI > 50 do not schedule in ASC.    If patient BMI > 45 [facility] not [facility]    Are you taking methadone or Suboxone?  No    Have you had difficulties, pain, or discomfort during past endoscopy procedures?  No    Are you taking any prescription medications for pain 3 or more times per week?   NO - No RN review required.    Do you have a history of malignant hyperthermia or adverse reaction to anesthesia?  No    (Females) Are you currently pregnant?   No     Have you been diagnosed or told you have pulmonary hypertension?   No    Do you have an LVAD?  No    Have you been told you have moderate to severe sleep apnea?  No    Have you been told you have COPD, asthma, or any other lung disease?  No    Do you have any heart conditions?  No     Have you ever had an organ transplant?   No    Have you ever had or are you awaiting a heart or lung transplant?   No    Have you had a stroke or transient ischemic attack (TIA aka \"mini stroke\" in the last 6 months?   No    Have you been diagnosed with or been told you have cirrhosis of the liver?   No    Are you currently on dialysis?   No    Do you need assistance transferring?   No    BMI: Estimated body mass index is 27.69 kg/m  as calculated from the following:    Height as of 2/6/24: 1.778 m (5' 10\").    Weight as of 2/6/24: 87.5 kg (193 lb).     Is patients BMI > 40 and scheduling location [facility]?  No    Do you take an " injectable medication for weight loss or diabetes (excluding insulin)?  No    Do you take the medication Naltrexone?  No    Do you take blood thinners?  No       Prep   Are you currently on dialysis or do you have chronic kidney disease?  No    Do you have a diagnosis of diabetes?  Yes (Golytely Prep)    Do you have a diagnosis of cystic fibrosis (CF)?  No    On a regular basis do you go 3 -5 days between bowel movements?  No    BMI > 40?  No    Preferred Pharmacy:        [facility] [place] [address] [facility] [address] [facility] [address] [place] [address]  Phone: [phone] Fax: [phone]      Final Scheduling Details   Colonoscopy prep sent?  N/A    Procedure scheduled  Colonoscopy    Surgeon:  Alex     Date of procedure:  3/7/24     Pre-OP / PAC:   No - Not required for this site.    Location  [facility] - Per order.    Sedation   MAC/Deep Sedation  location      Patient Reminders:   You will receive a call from a Nurse to review instructions and health history.  This assessment must be completed prior to your procedure.  Failure to complete the Nurse assessment may result in the procedure being cancelled.      On the day of your procedure, please designate an adult(s) who can drive you home stay with you for the next 24 hours. The medicines used in the exam will make you sleepy. You will not be able to drive.      You cannot take public transportation, ride share services, or non-medical taxi service without a responsible caregiver.  Medical transport services are allowed with the requirement that a responsible caregiver will receive you at your destination.  We require that drivers and caregivers are confirmed prior to your procedure.

## 2024-02-14 ENCOUNTER — LAB (OUTPATIENT)
Dept: LAB | Facility: CLINIC | Age: 64
End: 2024-02-14
Payer: COMMERCIAL

## 2024-02-14 DIAGNOSIS — Z00.00 ENCOUNTER FOR ROUTINE ADULT HEALTH EXAMINATION WITHOUT ABNORMAL FINDINGS: ICD-10-CM

## 2024-02-14 DIAGNOSIS — Z12.5 SCREENING FOR PROSTATE CANCER: ICD-10-CM

## 2024-02-14 LAB
ALBUMIN SERPL BCG-MCNC: 4.3 G/DL (ref 3.5–5.2)
ALP SERPL-CCNC: 73 U/L (ref 40–150)
ALT SERPL W P-5'-P-CCNC: 51 U/L (ref 0–70)
AST SERPL W P-5'-P-CCNC: 31 U/L (ref 0–45)
BILIRUB DIRECT SERPL-MCNC: <0.2 MG/DL (ref 0–0.3)
BILIRUB SERPL-MCNC: 0.6 MG/DL
PROT SERPL-MCNC: 6.9 G/DL (ref 6.4–8.3)
PSA SERPL DL<=0.01 NG/ML-MCNC: 1.07 NG/ML (ref 0–4.5)

## 2024-02-14 PROCEDURE — G0103 PSA SCREENING: HCPCS

## 2024-02-14 PROCEDURE — 80076 HEPATIC FUNCTION PANEL: CPT

## 2024-02-14 PROCEDURE — 36415 COLL VENOUS BLD VENIPUNCTURE: CPT

## 2024-02-16 ENCOUNTER — TELEPHONE (OUTPATIENT)
Dept: GASTROENTEROLOGY | Facility: CLINIC | Age: 64
End: 2024-02-16
Payer: COMMERCIAL

## 2024-02-16 NOTE — TELEPHONE ENCOUNTER
Caller: Enmanuel  Reason for Reschedule/Cancellation (please be detailed, any staff messages or encounters to note?): Provider out      Prior to reschedule please review:  Ordering Provider: Jesse  Sedation Determined: CS  Does patient have any ASC Exclusions, please identify?: N      Notes on Cancelled Procedure:  Procedure: Lower Endoscopy [Colonoscopy]   Date: 3/7/24  [place] [facility] [address] [place] [address] , [place] [address]  Surgeon: Olga Lidia      Rescheduled: No-left VM and sent MC    Case in depot     Send In - basket message to Panc - Serafin Pool if EUS  procedure is canceled or rescheduled: [ N/A, YES or NO] NA

## 2024-02-16 NOTE — TELEPHONE ENCOUNTER
Caller: Enmanuel Mckeon    Rescheduled: Yes  Procedure: Lower Endoscopy [Colonoscopy]   Date: 03/28/2024  Location: [facility]; [address] [place] [address] , [place] [address]  Surgeon: GABRIELLA  Sedation Level Scheduled  MAC,  Reason for Sedation Level PER LOCATION  Prep/Instructions updated and sent: [employer]       Send In - basket message to Panc - Serafin Pool if EUS  procedure is canceled or rescheduled: [ N/A, YES or NO] NA

## 2024-03-27 ENCOUNTER — ANESTHESIA EVENT (OUTPATIENT)
Dept: GASTROENTEROLOGY | Facility: CLINIC | Age: 64
End: 2024-03-27
Payer: COMMERCIAL

## 2024-03-27 ASSESSMENT — LIFESTYLE VARIABLES: TOBACCO_USE: 1

## 2024-03-28 ENCOUNTER — HOSPITAL ENCOUNTER (OUTPATIENT)
Facility: CLINIC | Age: 64
Discharge: HOME | End: 2024-03-28
Attending: SURGERY | Admitting: SURGERY
Payer: COMMERCIAL

## 2024-03-28 ENCOUNTER — ANESTHESIA (OUTPATIENT)
Dept: GASTROENTEROLOGY | Facility: CLINIC | Age: 64
End: 2024-03-28
Payer: COMMERCIAL

## 2024-03-28 VITALS
HEART RATE: 75 BPM | TEMPERATURE: 97.7 F | WEIGHT: 193 LBS | DIASTOLIC BLOOD PRESSURE: 70 MMHG | SYSTOLIC BLOOD PRESSURE: 103 MMHG | BODY MASS INDEX: 27.69 KG/M2 | OXYGEN SATURATION: 94 % | RESPIRATION RATE: 16 BRPM

## 2024-03-28 LAB
COLONOSCOPY: NORMAL
GLUCOSE BLDC GLUCOMTR-MCNC: 137 MG/DL (ref 70–99)

## 2024-03-28 PROCEDURE — 250N000011 HC RX IP 250 OP 636: Performed by: NURSE ANESTHETIST, CERTIFIED REGISTERED

## 2024-03-28 PROCEDURE — 258N000003 HC RX IP 258 OP 636: Performed by: NURSE ANESTHETIST, CERTIFIED REGISTERED

## 2024-03-28 PROCEDURE — 88305 TISSUE EXAM BY PATHOLOGIST: CPT | Mod: 26 | Performed by: PATHOLOGY

## 2024-03-28 PROCEDURE — 45380 COLONOSCOPY AND BIOPSY: CPT | Performed by: SURGERY

## 2024-03-28 PROCEDURE — 370N000017 HC ANESTHESIA TECHNICAL FEE, PER MIN: Performed by: SURGERY

## 2024-03-28 PROCEDURE — 88305 TISSUE EXAM BY PATHOLOGIST: CPT | Mod: TC | Performed by: SURGERY

## 2024-03-28 PROCEDURE — 82962 GLUCOSE BLOOD TEST: CPT

## 2024-03-28 PROCEDURE — 250N000009 HC RX 250: Performed by: NURSE ANESTHETIST, CERTIFIED REGISTERED

## 2024-03-28 PROCEDURE — 45385 COLONOSCOPY W/LESION REMOVAL: CPT | Mod: PT | Performed by: SURGERY

## 2024-03-28 RX ORDER — PROPOFOL 10 MG/ML
INJECTION, EMULSION INTRAVENOUS PRN
Status: DISCONTINUED | OUTPATIENT
Start: 2024-03-28 | End: 2024-03-28

## 2024-03-28 RX ORDER — SODIUM CHLORIDE, SODIUM LACTATE, POTASSIUM CHLORIDE, CALCIUM CHLORIDE 600; 310; 30; 20 MG/100ML; MG/100ML; MG/100ML; MG/100ML
INJECTION, SOLUTION INTRAVENOUS CONTINUOUS
Status: DISCONTINUED | OUTPATIENT
Start: 2024-03-28 | End: 2024-03-28 | Stop reason: HOSPADM

## 2024-03-28 RX ORDER — FLUMAZENIL 0.1 MG/ML
0.2 INJECTION, SOLUTION INTRAVENOUS
Status: DISCONTINUED | OUTPATIENT
Start: 2024-03-28 | End: 2024-03-28 | Stop reason: HOSPADM

## 2024-03-28 RX ORDER — ONDANSETRON 2 MG/ML
4 INJECTION INTRAMUSCULAR; INTRAVENOUS EVERY 30 MIN PRN
Status: DISCONTINUED | OUTPATIENT
Start: 2024-03-28 | End: 2024-03-28 | Stop reason: HOSPADM

## 2024-03-28 RX ORDER — LIDOCAINE 40 MG/G
CREAM TOPICAL
Status: DISCONTINUED | OUTPATIENT
Start: 2024-03-28 | End: 2024-03-28 | Stop reason: HOSPADM

## 2024-03-28 RX ORDER — LIDOCAINE HYDROCHLORIDE 20 MG/ML
INJECTION, SOLUTION INFILTRATION; PERINEURAL PRN
Status: DISCONTINUED | OUTPATIENT
Start: 2024-03-28 | End: 2024-03-28

## 2024-03-28 RX ORDER — ONDANSETRON 2 MG/ML
4 INJECTION INTRAMUSCULAR; INTRAVENOUS
Status: DISCONTINUED | OUTPATIENT
Start: 2024-03-28 | End: 2024-03-28 | Stop reason: HOSPADM

## 2024-03-28 RX ORDER — PROCHLORPERAZINE MALEATE 5 MG
10 TABLET ORAL EVERY 6 HOURS PRN
Status: DISCONTINUED | OUTPATIENT
Start: 2024-03-28 | End: 2024-03-28 | Stop reason: HOSPADM

## 2024-03-28 RX ORDER — NALOXONE HYDROCHLORIDE 0.4 MG/ML
0.2 INJECTION, SOLUTION INTRAMUSCULAR; INTRAVENOUS; SUBCUTANEOUS
Status: DISCONTINUED | OUTPATIENT
Start: 2024-03-28 | End: 2024-03-28 | Stop reason: HOSPADM

## 2024-03-28 RX ORDER — ONDANSETRON 4 MG/1
4 TABLET, ORALLY DISINTEGRATING ORAL EVERY 30 MIN PRN
Status: DISCONTINUED | OUTPATIENT
Start: 2024-03-28 | End: 2024-03-28 | Stop reason: HOSPADM

## 2024-03-28 RX ORDER — ONDANSETRON 4 MG/1
4 TABLET, ORALLY DISINTEGRATING ORAL EVERY 6 HOURS PRN
Status: DISCONTINUED | OUTPATIENT
Start: 2024-03-28 | End: 2024-03-28 | Stop reason: HOSPADM

## 2024-03-28 RX ORDER — ONDANSETRON 2 MG/ML
4 INJECTION INTRAMUSCULAR; INTRAVENOUS EVERY 6 HOURS PRN
Status: DISCONTINUED | OUTPATIENT
Start: 2024-03-28 | End: 2024-03-28 | Stop reason: HOSPADM

## 2024-03-28 RX ORDER — NALOXONE HYDROCHLORIDE 0.4 MG/ML
0.4 INJECTION, SOLUTION INTRAMUSCULAR; INTRAVENOUS; SUBCUTANEOUS
Status: DISCONTINUED | OUTPATIENT
Start: 2024-03-28 | End: 2024-03-28 | Stop reason: HOSPADM

## 2024-03-28 RX ORDER — FENTANYL CITRATE 50 UG/ML
25 INJECTION, SOLUTION INTRAMUSCULAR; INTRAVENOUS
Status: DISCONTINUED | OUTPATIENT
Start: 2024-03-28 | End: 2024-03-28 | Stop reason: HOSPADM

## 2024-03-28 RX ORDER — PROPOFOL 10 MG/ML
INJECTION, EMULSION INTRAVENOUS CONTINUOUS PRN
Status: DISCONTINUED | OUTPATIENT
Start: 2024-03-28 | End: 2024-03-28

## 2024-03-28 RX ADMIN — PROPOFOL 100 MG: 10 INJECTION, EMULSION INTRAVENOUS at 11:28

## 2024-03-28 RX ADMIN — SODIUM CHLORIDE, POTASSIUM CHLORIDE, SODIUM LACTATE AND CALCIUM CHLORIDE: 600; 310; 30; 20 INJECTION, SOLUTION INTRAVENOUS at 11:12

## 2024-03-28 RX ADMIN — PROPOFOL 200 MCG/KG/MIN: 10 INJECTION, EMULSION INTRAVENOUS at 11:28

## 2024-03-28 RX ADMIN — LIDOCAINE HYDROCHLORIDE 50 MG: 20 INJECTION, SOLUTION INFILTRATION; PERINEURAL at 11:28

## 2024-03-28 ASSESSMENT — ACTIVITIES OF DAILY LIVING (ADL)
ADLS_ACUITY_SCORE: 35
ADLS_ACUITY_SCORE: 35

## 2024-03-28 NOTE — DISCHARGE INSTRUCTIONS
[facility]    Home Care Following Endoscopy          Activity:  You have just undergone an endoscopic procedure performed with sedation.    Do not work or operate machinery (including a car) OR drink alcohol (including beer) OR sign legal papers for at least 12 hours.      Diet:  Return to the diet you were on before your procedure but eat lightly for the first 12-24 hours.  Drink plenty of water.  Resume any regular medications unless otherwise advised by your physician.    You had  polyp removed so please refrain from aspirin or aspirin products for 2 days.      Pain:  You may take Tylenol as needed for pain.  Expected Recovery:  You can expect some mild abdominal fullness and/or discomfort due to the air used to inflate your intestinal tract.   I encourage you to walk and attempt to pass this air as soon as possible.     Call Your Physician if You Have:    After Colonoscopy:  Worsening persisting abdominal pain which is worse with activity.  Fevers (>101 degrees F), chills or shakes.  Passage of continued blood with bowel movements.     Any questions or concerns about your recovery, please call [phone] or after hours [phone] ([phone]) [place] Nurse Advice Line.    Follow-up Care:  You did have polyp removed.  The polyp will be sent to pathology.    You should receive letter in your My Chart from  with your results within 1-2 weeks.

## 2024-03-28 NOTE — ANESTHESIA PREPROCEDURE EVALUATION
Anesthesia Pre-Procedure Evaluation    Patient: Enmanuel Mckeon   MRN: [id number] : 1960        Procedure : Procedure(s):  Colonoscopy          Past Medical History:   Diagnosis Date    Diabetic eye exam (H) 11    Diabetic eye exam (H) 12, 14    Diabetic eye exam (H) 1/23/15    Essential hypertension, benign     Type I (juvenile type) diabetes mellitus without mention of complication, not stated as uncontrolled       Past Surgical History:   Procedure Laterality Date    COLONOSCOPY N/A 2018    Procedure: COLONOSCOPY WITH FORCEP BIOPSIES;  Surgeon: Enmanuel Gerber DO;  Location: [facility] COLONOSCOPY W/WO BRUSH/WASH  09      No Known Allergies   Social History     Tobacco Use    Smoking status: Former    Smokeless tobacco: Former    Tobacco comments:     quite 20 years ago   Substance Use Topics    Alcohol use: Yes     Alcohol/week: 0.0 standard drinks of alcohol     Comment: occasional      Wt Readings from Last 1 Encounters:   24 87.5 kg (193 lb)        Anesthesia Evaluation   Pt has had prior anesthetic. Type: MAC.    No history of anesthetic complications       ROS/MED HX  ENT/Pulmonary:     (+)                tobacco use, Past use,                       Neurologic:  - neg neurologic ROS     Cardiovascular:     (+)  hypertension-range: < 140/90/ -   -  - -                                 Previous cardiac testing   Echo: Date: Results:    Stress Test:  Date: Results:    ECG Reviewed:  Date: 2020 Results:  Sinus  Rhythm   WITHIN NORMAL LIMITS  Cath:  Date: Results:      METS/Exercise Tolerance:     Hematologic:  - neg hematologic  ROS     Musculoskeletal:  - neg musculoskeletal ROS     GI/Hepatic:  - neg GI/hepatic ROS     Renal/Genitourinary:       Endo:     (+) type I DM,  Last HgA1c: 8.8, date: 11/10/2023, Using insulin, - not using insulin pump.                Psychiatric/Substance Use:  - neg psychiatric ROS     Infectious Disease:  - neg  "infectious disease ROS     Malignancy:  - neg malignancy ROS     Other:  - neg other ROS          Physical Exam    Airway  airway exam normal      Mallampati: II   TM distance: > 3 FB   Neck ROM: full   Mouth opening: > 3 cm    Respiratory Devices and Support         Dental  no notable dental history     (+) Minor Abnormalities - some fillings, tiny chips      Cardiovascular   cardiovascular exam normal       Rhythm and rate: regular and normal     Pulmonary   pulmonary exam normal        breath sounds clear to auscultation       Other findings:  with IV start.  Insulin pump turned off.  Will check GLC immediately after procedure prior to initiating Pump again.    OUTSIDE LABS:  CBC:   Lab Results   Component Value Date    WBC 7.1 06/18/2020    WBC 5.1 04/09/2003    HGB 16.3 06/18/2020    HGB 14.6 04/09/2003    HCT 46.6 06/18/2020    HCT 45.5 04/09/2003     06/18/2020     04/09/2003     BMP:   Lab Results   Component Value Date     11/10/2023     10/21/2022    POTASSIUM 4.6 11/10/2023    POTASSIUM 4.4 10/21/2022    CHLORIDE 102 11/10/2023    CHLORIDE 109 10/21/2022    CO2 25 11/10/2023    CO2 28 10/21/2022    BUN 18.1 11/10/2023    BUN 20 10/21/2022    CR 0.83 11/10/2023    CR 0.75 10/21/2022     (H) 11/10/2023     (H) 10/21/2022     COAGS: No results found for: \"PTT\", \"INR\", \"FIBR\"  POC:   Lab Results   Component Value Date     (H) 11/19/2018     HEPATIC:   Lab Results   Component Value Date    ALBUMIN 4.3 02/14/2024    PROTTOTAL 6.9 02/14/2024    ALT 51 02/14/2024    AST 31 02/14/2024    ALKPHOS 73 02/14/2024    BILITOTAL 0.6 02/14/2024     OTHER:   Lab Results   Component Value Date    PH 5.0 04/18/2003    A1C 8.8 (H) 11/10/2023    SHAHEEN 9.6 11/10/2023    PHOS 3.3 11/10/2023    TSH 1.55 10/21/2022    SED 2 04/09/2003       Anesthesia Plan    ASA Status:  2    NPO Status:  NPO Appropriate    Anesthesia Type: MAC.     - Reason for MAC: straight local not " clinically adequate   Induction: Intravenous, Propofol.   Maintenance: TIVA.        Consents    Anesthesia Plan(s) and associated risks, benefits, and realistic alternatives discussed. Questions answered and patient/representative(s) expressed understanding.     - Discussed:     - Discussed with:  Patient      - Extended Intubation/Ventilatory Support Discussed: No.      - Patient is DNR/DNI Status: No     Use of blood products discussed: No .     Postoperative Care    Pain management: IV analgesics.        Comments:    Other Comments: The risks and benefits of anesthesia, and the alternatives where applicable, have been discussed with the patient, and they wish to proceed.           Jeffery Henderson APRN CRNA    I have reviewed the pertinent notes and labs in the chart from the past 30 days and (re)examined the patient.  Any updates or changes from those notes are reflected in this note.              # DMII: A1C = N/A within past 6 months

## 2024-03-28 NOTE — H&P
Patient seen for Endoscopy    HPI:  Patient is a 63 year old male w hx polyps here for endoscopy. Not taking blood thinning medications. No MI or CVA history. No issues with previous sedation. No recent acute illness.    Review Of Systems    Skin: negative  Ears/Nose/Throat: negative  Respiratory: No shortness of breath, dyspnea on exertion, cough, or hemoptysis  Cardiovascular: negative  Gastrointestinal: negative  Genitourinary: negative  Musculoskeletal: negative  Neurologic: negative  Hematologic/Lymphatic/Immunologic: negative  Endocrine: negative      Past Medical History:   Diagnosis Date    Diabetic eye exam (H) 08/12/11    Diabetic eye exam (H) 08/24/12, 2/14/14    Diabetic eye exam (H) 1/23/15    Essential hypertension, benign     Type I (juvenile type) diabetes mellitus without mention of complication, not stated as uncontrolled        Past Surgical History:   Procedure Laterality Date    COLONOSCOPY N/A 11/19/2018    Procedure: COLONOSCOPY WITH FORCEP BIOPSIES;  Surgeon: Enmanuel Gerber DO;  Location: [facility] COLONOSCOPY W/WO BRUSH/WASH  01/27/09       Family History   Problem Relation Age of Onset    Alcohol/Drug Sister     Diabetes Father     Heart Disease Father         4 way bypass    Neurologic Disorder Mother         anurism       Social History     Socioeconomic History    Marital status:      Spouse name: Not on file    Number of children: Not on file    Years of education: Not on file    Highest education level: Not on file   Occupational History    Not on file   Tobacco Use    Smoking status: Former    Smokeless tobacco: Former    Tobacco comments:     quite 20 years ago   Vaping Use    Vaping Use: Never used   Substance and Sexual Activity    Alcohol use: Yes     Alcohol/week: 0.0 standard drinks of alcohol     Comment: occasional    Drug use: No    Sexual activity: Yes     Partners: Female   Other Topics Concern    Parent/sibling w/ CABG, MI or angioplasty before  65F 55M? No   Social History Narrative    Not on file     Social Determinants of Health     Financial Resource Strain: Low Risk  (2/6/2024)    Financial Resource Strain     Within the past 12 months, have you or your family members you live with been unable to get utilities (heat, electricity) when it was really needed?: No   Food Insecurity: Low Risk  (2/6/2024)    Food Insecurity     Within the past 12 months, did you worry that your food would run out before you got money to buy more?: No     Within the past 12 months, did the food you bought just not last and you didn t have money to get more?: No   Transportation Needs: Low Risk  (2/6/2024)    Transportation Needs     Within the past 12 months, has lack of transportation kept you from medical appointments, getting your medicines, non-medical meetings or appointments, work, or from getting things that you need?: No   Physical Activity: Unknown (2/6/2024)    Exercise Vital Sign     Days of Exercise per Week: 2 days     Minutes of Exercise per Session: Not on file   Stress: No Stress Concern Present (2/6/2024)    [employer] [place] [employer] - Occupational Stress Questionnaire     Feeling of Stress : Only a little   Social Connections: Unknown (2/6/2024)    Social Connection and Isolation Panel [NHANES]     Frequency of Communication with Friends and Family: Not on file     Frequency of Social Gatherings with Friends and Family: Twice a week     Attends Sikh Services: Not on file     Active Member of Clubs or Organizations: Not on file     Attends Club or Organization Meetings: Not on file     Marital Status: Not on file   Interpersonal Safety: Low Risk  (2/6/2024)    Interpersonal Safety     Do you feel physically and emotionally safe where you currently live?: Yes     Within the past 12 months, have you been hit, slapped, kicked or otherwise physically hurt by someone?: No     Within the past 12 months, have you been humiliated or emotionally  abused in other ways by your partner or ex-partner?: No   Housing Stability: Low Risk  (2/6/2024)    Housing Stability     Do you have housing? : Yes     Are you worried about losing your housing?: No       No current outpatient medications on file.       Medications and history reviewed    Physical exam:  Vitals: /73   Temp 97.7  F (36.5  C) (Oral)   Resp 16   Wt 87.5 kg (193 lb)   SpO2 96%   BMI 27.69 kg/m    BMI= Body mass index is 27.69 kg/m .    Constitutional: Healthy, alert, non-distressed   Head: Normo-cephalic, atraumatic, no lesions, masses or tenderness   Cardiovascular: RRR, no new murmurs, +S1, +S2 heart sounds, no clicks, rubs or gallops   Respiratory: CTAB, no rales, rhonchi or wheezing, equal chest rise, good respiratory effort   Gastrointestinal: Soft, non-tender, non distended, no rebound rigidity or guarding, no masses or hernias palpated   : Deferred  Musculoskeletal: Moves all extremities, normal  strength, no deformities noted   Skin: No suspicious lesions or rashes   Psychiatric: Mentation appears normal, affect appropriate   Hematologic/Lymphatic/Immunologic: Normal cervical and supraclavicular lymph nodes   Patient able to get up on table without difficulty.    Labs show:  Results for orders placed or performed during the hospital encounter of 03/28/24 (from the past 24 hour(s))   Glucose by meter   Result Value Ref Range    GLUCOSE BY METER POCT 137 (H) 70 - 99 mg/dL       Assessment: Endoscopy  Plan: Pt cleared for anesthesia for proposed procedure.    Mike Delgado, DO

## 2024-03-28 NOTE — ANESTHESIA POSTPROCEDURE EVALUATION
Patient: Enmanuel Mckeon    Procedure: Procedure(s):  COLONOSCOPY, WITH POLYPECTOMY AND BIOPSY       Anesthesia Type:  MAC    Note:  Disposition: Outpatient   Postop Pain Control: Uneventful            Sign Out: Well controlled pain   PONV: No   Neuro/Psych: Uneventful            Sign Out: Acceptable/Baseline neuro status   Airway/Respiratory: Uneventful            Sign Out: Acceptable/Baseline resp. status   CV/Hemodynamics: Uneventful            Sign Out: Acceptable CV status   Other NRE: NONE   DID A NON-ROUTINE EVENT OCCUR? No    Event details/Postop Comments:  Pt was happy with anesthesia care.  No complications.  I will follow up with the pt if needed.           Last vitals:  Vitals Value Taken Time   /70 03/28/24 1158   Temp     Pulse 75 03/28/24 1158   Resp     SpO2 94 % 03/28/24 1158   Vitals shown include unfiled device data.    Electronically Signed By: DEBBI Marks CRNA  March 28, 2024  12:17 PM

## 2024-03-28 NOTE — ANESTHESIA CARE TRANSFER NOTE
Patient: Enmanuel Mckeon    Procedure: Procedure(s):  COLONOSCOPY, WITH POLYPECTOMY AND BIOPSY       Diagnosis: Screen for colon cancer [Z12.11]  Diagnosis Additional Information: No value filed.    Anesthesia Type:   MAC     Note:    Oropharynx: spontaneously breathing  Level of Consciousness: awake  Oxygen Supplementation: room air    Independent Airway: airway patency satisfactory and stable  Dentition: dentition unchanged  Vital Signs Stable: post-procedure vital signs reviewed and stable  Report to RN Given: handoff report given  Patient transferred to: Phase II    Handoff Report: Identifed the Patient, Identified the Reponsible Provider, Reviewed the pertinent medical history, Discussed the surgical course, Reviewed Intra-OP anesthesia mangement and issues during anesthesia, Set expectations for post-procedure period and Allowed opportunity for questions and acknowledgement of understanding      Vitals:  Vitals Value Taken Time   /70 03/28/24 1158   Temp     Pulse 75 03/28/24 1158   Resp     SpO2 94 % 03/28/24 1158   Vitals shown include unfiled device data.    Electronically Signed By: DEBBI Marks CRNA  March 28, 2024  12:16 PM

## 2024-03-28 NOTE — LETTER
Enmanuel Mckeon  [address] [place] [address]  April 3, 2024    Dear Enmanuel,  This letter is to inform you of the results of your pathology report from your colonoscopy.  Your pathology report was:  Final Diagnosis   Large intestine, ascending, polyp, biopsy/polypectomy:  - Sessile serrated adenoma negative for cytological dysplasia and malignancy.    These are benign polyps. These types of polyps do carry a small risk of developing into a cancer over time if not removed. Yours were completely removed at the time of your colonoscopy. You should have another surveillance colonoscopy in 5 years.  If you have further questions please don t hesitate to call our clinic at [phone].   Sincerely,     Mike Delgado, DO

## 2024-04-01 LAB
PATH REPORT.COMMENTS IMP SPEC: NORMAL
PATH REPORT.COMMENTS IMP SPEC: NORMAL
PATH REPORT.FINAL DX SPEC: NORMAL
PATH REPORT.GROSS SPEC: NORMAL
PATH REPORT.MICROSCOPIC SPEC OTHER STN: NORMAL
PATH REPORT.RELEVANT HX SPEC: NORMAL
PHOTO IMAGE: NORMAL

## 2024-05-25 ENCOUNTER — HEALTH MAINTENANCE LETTER (OUTPATIENT)
Age: 64
End: 2024-05-25

## 2024-11-26 ENCOUNTER — LAB (OUTPATIENT)
Dept: LAB | Facility: CLINIC | Age: 64
End: 2024-11-26
Payer: COMMERCIAL

## 2024-11-26 DIAGNOSIS — I10 HYPERTENSION GOAL BP (BLOOD PRESSURE) < 140/90: ICD-10-CM

## 2024-11-26 DIAGNOSIS — E78.2 MIXED HYPERLIPIDEMIA: ICD-10-CM

## 2024-11-26 DIAGNOSIS — E10.9 TYPE 1 DIABETES, HBA1C GOAL < 7% (H): ICD-10-CM

## 2024-11-26 DIAGNOSIS — E10.9 DIABETES MELLITUS TYPE I (H): Primary | ICD-10-CM

## 2024-11-26 LAB
ALBUMIN SERPL BCG-MCNC: 4.1 G/DL (ref 3.5–5.2)
ANION GAP SERPL CALCULATED.3IONS-SCNC: 15 MMOL/L (ref 7–15)
BUN SERPL-MCNC: 20.2 MG/DL (ref 8–23)
CALCIUM SERPL-MCNC: 9 MG/DL (ref 8.8–10.4)
CHLORIDE SERPL-SCNC: 100 MMOL/L (ref 98–107)
CHOLEST SERPL-MCNC: 209 MG/DL
CREAT SERPL-MCNC: 0.8 MG/DL (ref 0.67–1.17)
CREAT UR-MCNC: 147.7 MG/DL
EGFRCR SERPLBLD CKD-EPI 2021: >90 ML/MIN/1.73M2
EST. AVERAGE GLUCOSE BLD GHB EST-MCNC: 189 MG/DL
FASTING STATUS PATIENT QL REPORTED: YES
GLUCOSE SERPL-MCNC: 163 MG/DL (ref 70–99)
HBA1C MFR BLD: 8.2 %
HCO3 SERPL-SCNC: 24 MMOL/L (ref 22–29)
HDLC SERPL-MCNC: 96 MG/DL
LDLC SERPL CALC-MCNC: 96 MG/DL
MICROALBUMIN UR-MCNC: <12 MG/L
MICROALBUMIN/CREAT UR: NORMAL MG/G{CREAT}
NONHDLC SERPL-MCNC: 113 MG/DL
PHOSPHATE SERPL-MCNC: 4.9 MG/DL (ref 2.5–4.5)
POTASSIUM SERPL-SCNC: 4.1 MMOL/L (ref 3.4–5.3)
SODIUM SERPL-SCNC: 139 MMOL/L (ref 135–145)
TRIGL SERPL-MCNC: 86 MG/DL
TSH SERPL DL<=0.005 MIU/L-ACNC: 1.23 UIU/ML (ref 0.3–4.2)

## 2024-11-26 PROCEDURE — 80069 RENAL FUNCTION PANEL: CPT

## 2024-11-26 PROCEDURE — 80061 LIPID PANEL: CPT

## 2024-11-26 PROCEDURE — 36415 COLL VENOUS BLD VENIPUNCTURE: CPT

## 2024-11-26 PROCEDURE — 82043 UR ALBUMIN QUANTITATIVE: CPT

## 2024-11-26 PROCEDURE — 84443 ASSAY THYROID STIM HORMONE: CPT

## 2024-11-26 PROCEDURE — 82570 ASSAY OF URINE CREATININE: CPT

## 2024-11-26 PROCEDURE — 83036 HEMOGLOBIN GLYCOSYLATED A1C: CPT

## 2025-03-22 ENCOUNTER — HEALTH MAINTENANCE LETTER (OUTPATIENT)
Age: 65
End: 2025-03-22

## 2025-04-23 ENCOUNTER — TELEPHONE (OUTPATIENT)
Dept: ENDOCRINOLOGY | Facility: CLINIC | Age: 65
End: 2025-04-23
Payer: COMMERCIAL

## 2025-04-23 NOTE — TELEPHONE ENCOUNTER
Called pt regarding BG data for 4/24 appt. Pt states he uses the medtronic pump and checks BG using meter. Pt will attempt to follow instructions on [employer] at some point today regarding how to upload and share data with our clinic. [employer] message sent with instructions.

## 2025-04-23 NOTE — TELEPHONE ENCOUNTER
Called pt.  He can't connect to the internet right now, but will try again later.  Also, reminded pt to check his [employer] messages because there are step by step directions on how to download/prepare for appt. Pt verbalized understanding.Rajwinder Griffiths RN

## 2025-04-23 NOTE — TELEPHONE ENCOUNTER
Please call pt back. Per pt does not have any access right now to his internet and wants to know if he would still need to give his data for his BS before his visit or can this be done at the visit tomorrow? Please call pt. Thank you!

## 2025-04-24 ENCOUNTER — TELEPHONE (OUTPATIENT)
Dept: SURGERY | Facility: CLINIC | Age: 65
End: 2025-04-24

## 2025-04-24 ENCOUNTER — TELEPHONE (OUTPATIENT)
Dept: ENDOCRINOLOGY | Facility: CLINIC | Age: 65
End: 2025-04-24

## 2025-04-24 DIAGNOSIS — E78.5 HYPERLIPIDEMIA LDL GOAL <100: ICD-10-CM

## 2025-04-24 DIAGNOSIS — E10.9 TYPE 1 DIABETES, HBA1C GOAL < 7% (H): Primary | ICD-10-CM

## 2025-04-24 RX ORDER — LISINOPRIL 40 MG/1
20 TABLET ORAL DAILY
Qty: 15 TABLET | Refills: 0 | Status: SHIPPED | OUTPATIENT
Start: 2025-04-24

## 2025-04-24 RX ORDER — SIMVASTATIN 20 MG
20 TABLET ORAL AT BEDTIME
Qty: 30 TABLET | Refills: 0 | Status: SHIPPED | OUTPATIENT
Start: 2025-04-24

## 2025-04-24 NOTE — TELEPHONE ENCOUNTER
Patient unable to join Video visit today by Malachi or Franky due to technical difficulties with his phone. He also was not able to upload Medtronic data. Spoke to patient, he is out of lisinopril 40 mg (last dose yesterday) and simvastatin 20 mg (last dose 5 days ago). He does not have a PCP and his Endocrinology provider retired. He prefers to be rescheduled to in-person visit in [place] 4/29 at 10:30 where we can review insulin pump data. Refills ordered for simvastatin and lisinopril. He is okay for insulin right now. PCP referral placed. Visit for today cancelled.    DEBBI Sampson CNP

## 2025-04-28 ENCOUNTER — TELEPHONE (OUTPATIENT)
Dept: ENDOCRINOLOGY | Facility: CLINIC | Age: 65
End: 2025-04-28
Payer: COMMERCIAL

## 2025-04-28 DIAGNOSIS — Z53.9 ERRONEOUS ENCOUNTER--DISREGARD: Primary | ICD-10-CM

## 2025-04-28 NOTE — TELEPHONE ENCOUNTER
Patient unable to join Video visit today by Malachi or Franky due to technical difficulties with his phone. He also was not able to upload Medtronic data. Spoke to patient, he is out of lisinopril 40 mg (last dose yesterday) and simvastatin 20 mg (last dose 5 days ago). He does not have a PCP and his Endocrinology provider retired. He prefers to be rescheduled to in-person visit in [place] 4/29 at 10:30 where we can review insulin pump data. Refills ordered for simvastatin and lisinopril. He is okay for insulin right now. PCP referral placed. Visit for today cancelled.     DEBBI Sampson CNP

## 2025-04-29 ENCOUNTER — OFFICE VISIT (OUTPATIENT)
Dept: ENDOCRINOLOGY | Facility: CLINIC | Age: 65
End: 2025-04-29
Payer: COMMERCIAL

## 2025-04-29 VITALS
SYSTOLIC BLOOD PRESSURE: 151 MMHG | OXYGEN SATURATION: 95 % | DIASTOLIC BLOOD PRESSURE: 97 MMHG | WEIGHT: 204.8 LBS | HEART RATE: 88 BPM | BODY MASS INDEX: 28.56 KG/M2

## 2025-04-29 DIAGNOSIS — E10.9 TYPE 1 DIABETES, HBA1C GOAL < 7% (H): Primary | ICD-10-CM

## 2025-04-29 DIAGNOSIS — I10 HYPERTENSION GOAL BP (BLOOD PRESSURE) < 140/90: ICD-10-CM

## 2025-04-29 DIAGNOSIS — E78.5 HYPERLIPIDEMIA LDL GOAL <100: ICD-10-CM

## 2025-04-29 DIAGNOSIS — E10.319 DIABETIC RETINOPATHY ASSOCIATED WITH TYPE 1 DIABETES MELLITUS, MACULAR EDEMA PRESENCE UNSPECIFIED, UNSPECIFIED LATERALITY, UNSPECIFIED RETINOPATHY SEVERITY (H): ICD-10-CM

## 2025-04-29 RX ORDER — SIMVASTATIN 40 MG
40 TABLET ORAL AT BEDTIME
Qty: 90 TABLET | Refills: 3 | Status: SHIPPED | OUTPATIENT
Start: 2025-04-29

## 2025-04-29 RX ORDER — LISINOPRIL 40 MG/1
40 TABLET ORAL DAILY
Qty: 90 TABLET | Refills: 3 | Status: SHIPPED | OUTPATIENT
Start: 2025-04-29

## 2025-04-29 NOTE — PATIENT INSTRUCTIONS
Nice to meet you today!     No changes were made to your insulin pump settings today.     I placed an order to Diabetes Education for review and to help with Guardian sensors. Please bring a sensor from home to your first appointment.    Reminder to bring all of your medication bottles from home to your appointment with your Primary Care Provider in May.    Important information for new patients:     Welcome to Endocrinology!  We look forward to partnering with you in managing your diabetes.  Here are a few basic things to know about how our clinic operates.     Clinic visits: We practice diabetes  team care.   We schedule our patients to be seen every 3 months with the NP (Nurse Practitioner) and once a year with the staff endocrinologist and diabetes educator/dietitian.  We often see patients more frequently when more support is required.  Visits can be virtual or in person.  We ask that you schedule an in person visit with your provider at least once a year and see a diabetes educator once a year. Scheduling with providers may take several months, so please schedule appointments now for future visits.     [employer]: [employer] is the easiest way to communicate with your care team, receive test results, and review your after-visit summaries and clinic notes.  Please create a [employer] account if you haven t already.  The phone connie is also very easy to use.  Clinic staff can help you set this up.      Glucose data: Please upload or share your sensor, pump, meter data or send your In-pen report 1-3 days prior to your appointment.  One of our clinic facilitators will reach out to you in the days before your appointment to ensure you know how to share your data. This allows us to review your data before your appointment.     Labs: A1c every 3 months (standing order).  Fasting labs once a year (fasting lipids, microalbumin, basic metabolic panel, other labs as needed). Lab results will be sent to you through [employer], or a  letter will be mailed to you. Lab scheduling number- [phone]    Eye exam: Please schedule once a year for a dilated eye exam. Please let me know if you need a referral.     Refills: If you need prescription refills, please reach out to your pharmacy.  At your clinic visit, we recommend you ask for refills of all of your diabetes supplies/insulin and we will give you one year s worth of refills.     Primary care: Please schedule an annual physical with your primary care provider.  Although diabetes is likely your main medical concern, it is important to have an established provider for preventive care and to manage issues that may arise.  Please let us know if you do not have a PCP, and we will make a recommendation.     Financial concerns: We understand that living with diabetes can be expensive.  Many patients will feel financial stress as a result. Please let us know if you have difficulty in affording your treatments, as assistance may be available.      Emergency issues: Here are some concerns you should contact us about.  -Vomiting: more than twice.  Please check ketones.  If positive, go to ER. Monitor glucose hourly.   -High glucose (over 300 mg/dL twice in a row): Please check ketones.  If ketones are negative, take an insulin correction and recheck glucose in 1 hour.  If glucose is not coming down, please call the clinic. If ketones are moderate or large, drink lots of water, take an insulin correction 1.5 times your usual correction, and recheck ketones in 1 hour.  If ketones are still present (or you are vomiting), go to the ER.  -High glucose (over 300 mg/dL twice in a row) with a pump:  Twice in doubt, take it out.  Change your pump site. Check ketones.  If ketones are negative, take an insulin correction by syringe/pen and recheck glucose in 1 hour.  If glucose is not coming down, please call the clinic. If ketones are moderate or large, drink lots of water, take an insulin correction 1.5 times  your usual correction by syringe/pen, and recheck ketones in 1 hour.  If ketones are still present (or you are vomiting), go to the ER.  -Hypoglycemia (low glucose):   If glucose is less than 70 mg/dL, treat with 15g carb (4 glucose tablets), recheck glucose in 15 minutes.  If low again, repeat.   If glucose is less than 54 mg/dL, treat with 30g carb, recheck glucose in 15 minutes.  If low again, repeat.  Keep glucagon in your home in case of severe hypoglycemia and train someone how to use this.    Emergency kit (please ensure you always have these with you):   Glucose tablets  Glucagon  Insulin  Syringes/needles   Extra infusion set (if on a pump)  Ketone strips    Backup insulin plan (in case of pump failure):   If your insulin pump fails, your body will not have any insulin available and your blood glucose levels can get dangerously high. This can result in diabetic ketoacidosis making you very sick (abdominal pain, confusion, vomiting, dehydration, positive urine ketones).    You have an active long acting basal insulin (Degludec: Tresiba / Detemir: Levemir / Glargine: Lantus / Toujeo / Semglee / Basaglar) prescription available. Please pick this up from your pharmacy in case your pump fails.  Basal insulin dose: 20 units once per day.    Immediately after your pump fails and until you can  the long acting insulin, use short acting insulin (Aspart: Novolog/Fiasp / Lispro: Humalog / Glulisine:Apidra) injections (pen or vial and syringe) per your correction scale every 4 hours and continue to cover carbohydrates. You can stop this 4 hourly correction after you give yourself the basal insulin dose.    You should also administer short acting insulin subcutaneously to cover carbohydrates and per your correction scale prior to meals.     Contact us for help transitioning back to your pump when this is available.     Contact information:   If you have concerns, please send me a [employer] message or call the  clinic at [phone].  For more urgent concerns, please call [phone] after hours/weekends and ask to speak with the endocrinologist on call.      Please let me know if you are having low blood sugars less than 70 or over 350 mg/dL.  Do not wait until your next appointment if this is happening.      Thank you for your partnership.  We look forward to getting to know you!       [facility]    [facility]  [place] [address]    Phone number: [phone]  Fax number: [phone]    If you need a medication refill, please contact us as you may need lab work and/or a follow up visit prior to your refill.    SHARE CODES FOR YOUR SENSORS    MHFVDIABETES- DEXCOM  [id number]- BIJAN      Thank you for choosing [facility]!

## 2025-04-29 NOTE — PROGRESS NOTES
Diabetes Consult Note  April 29, 2025        Enmanuel Mckeon is a 64 year old male with Type 1 Diabetes here for a New Patient visit to establish care. He also has a history of HTN, HLD, C Diff.       HPI:   He has had diabetes since 1985. Has been on insulin pump therapy since 1983.     He has been using the Medtronic 780G for 3-4 years. Overall he is satisfied with the pump.     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 Lately he has been less active due to less work.    Previously followed by Dr. Yunier Thompson, prior endocrinologist at [facility]. Last seen Dr. Thompson in December 2024, seeking new endo due to changes to insurance acceptance through [facility] per patient. Records not yet available to review in EHR.    Most recent A1C 8.2 11/26/2024, prior control 7.6% - 8.8% since 2018.    Typical Day:  Gets up at 7, checks blood glucose. Eats toast with peanut butter and large coffee with cream.  Eats lunch, sandwich if at home, or on the road if working  Eats supper at 5 pm, wife cooks, I.e. spagghetti   Snacks - Carries snack bars if blood glucose getting low.  Drinks Water, takes with when working. No pop.    Activity: Walks dog in the morning 1/4 mile. No intentional exercise at this time.    Lives with wife in [place].    Current Diabetes Medications:   Medtronic 780g  insulin pump with Novolog     We reviewed glucometer data from the last 2 weeks together.  It revealed:  FBG   Before lunch:   Before dinner: [phone]  Bedtime: 134-333    Highly variable readings with some lows in the 60s  fasting and before lunch.    Current Pump Settings:   Basal  I:CHO  ISF  12 am 0.9u/h  1:14  50  5 am 0.8u/h  1:11  43  10 am 0.8u/h  1:13  43  4:30pm 0.8u/h  1:11  38  5 pm 1.1u/h  1:11  38  7 pm 0.9u/h  1:11  38  10 pm 0.9u/h  1:12  43    20.8 total units daily basal.    Glucose targets 100-130    Active insulin time 2:30      Basal insulin in case  "of pump failure: Does not have  Glucagon: Has some in fridge but probably old  Ketone strips: Has some but may be old      Ability to sense low blood sugars:  Has symptoms <70. Feels chest tightness, lightheadedness.     History of Diabetes monitoring and complications/ prevention:  CAD: No  Last eye exam results: June 2024, +DRMandi Has not needed treatments. No recent vision changes.  Last dental exam: 2 weeks ago.  Neuropathy: None  Nephropathy: No, negative microalbumin 11/2024  HTN: Above goal today.  On statin: Yes simvastatin 40 mg  Depression: No  Feet: No ulcers or lesions per patient.    Enmanuel's PMH, PSH and allergies were reviewed today and pertinent information is summarized above.    Enmanuel's pertinent social and family history are also reviewed today and pertinent information is summarized above.    Current Outpatient Medications   Medication Sig Dispense Refill    aspirin 81 MG tablet Take 2 tablets (162 mg) by mouth daily 100 tablet 3    bisacodyl (DULCOLAX) 5 MG EC tablet Take 2 tablets at 3 pm the day before your procedure. If your procedure is before 11 am, take 2 additional tablets at 11 pm. If your procedure is after 11 am, take 2 additional tablets at 6 am. For additional instructions refer to your colonoscopy prep instructions. 4 tablet 0    blood glucose (NO BRAND SPECIFIED) test strip 5 times daily 100 each 0    cetirizine (ZYRTEC) 10 MG tablet Take 1 tablet (10 mg) by mouth daily 90 tablet 0    fluticasone (FLONASE) 50 MCG/ACT nasal spray Spray 1-2 sprays into both nostrils daily 16 g 11    Insulin Aspart (NOVOLOG SC)       INSULIN SYRINGE-NEEDLE U-100 MISC 0.25 ML 29 X 1/2\"  insulin pump      lisinopril (ZESTRIL) 40 MG tablet Take 0.5 tablets (20 mg) by mouth daily. 15 tablet 0    multivitamin w/minerals (THERA-VIT-M) tablet Take 1 tablet by mouth daily 100 tablet 3    polyethylene glycol (GOLYTELY) 236 g suspension The night before the exam at 6 pm drink an 8-ounce glass every 15 minutes until " "the jug is half empty. If you arrive before 11 AM: Drink the other half of the Earthineer jug at 11 PM night before procedure. If you arrive after 11 AM: Drink the other half of the Earthineer jug at 6 AM day of procedure. For additional instructions refer to your colonoscopy prep instructions. 4000 mL 0    simvastatin (ZOCOR) 20 MG tablet Take 1 tablet (20 mg) by mouth at bedtime. 30 tablet 0    simvastatin (ZOCOR) 40 MG tablet Take 1 tablet (40 mg) by mouth at bedtime       No current facility-administered medications for this visit.         ROS:   Reports good physical activity tolerance.  Denies any pedal lesions or vision changes or concerns. Denies any other acute concerns except as noted above.      Exam:    BP (!) 151/97   Pulse 88   Wt 92.9 kg (204 lb 12.8 oz)   SpO2 95%   BMI 28.56 kg/m    Wt Readings from Last 10 Encounters:   04/29/25 92.9 kg (204 lb 12.8 oz)   04/24/25 90.7 kg (200 lb)   03/28/24 87.5 kg (193 lb)   02/06/24 87.5 kg (193 lb)   12/27/23 90.7 kg (200 lb)   05/19/22 91.6 kg (202 lb)   07/19/21 88 kg (194 lb)   06/18/20 90.7 kg (200 lb)   01/22/19 89.4 kg (197 lb)   11/12/18 92.1 kg (203 lb)     Estimated body mass index is 27.89 kg/m  as calculated from the following:    Height as of 4/24/25: 1.803 m (5' 11\").    Weight as of 4/24/25: 90.7 kg (200 lb).    Physical Exam:  General: Pleasant, well nourished and hydrated male in NAD.   Psych:  Mood is \"good,\" affect is appropriate.  Thought form and content are fluid and coherent.    HEENT: Eyes and sclera are clear. Extraocular movements are grossly intact without proptosis.    Neck: No masses or JVD are noted.    Resp: Easy and unlabored breathing.   Neuro: Alert and oriented, communicating clearly.   Ext: no swelling or edema.    Diabetic foot exam: normal DP and PT pulses, no trophic changes or ulcerative lesions, and normal sensory exam  Data:      Recent Labs   Lab Test 11/26/24  0903 11/26/24  0857 02/14/24  0902 11/10/23  1648 " "11/10/23  0819 10/21/22  0836 10/21/22  0827   A1C  --  8.2*  --   --  8.8*   < > 7.9*   TSH  --  1.23  --   --   --   --  1.55   LDL  --  96  --   --  112*   < > 90   HDL  --  96  --   --  91   < > 85   TRIG  --  86  --   --  88   < > 59   CR  --  0.80  --   --  0.83  --  0.75   MICROL <12.0  --   --  14.6  --    < >  --    AST  --   --  31  --   --   --  29   ALT  --   --  51  --   --   --  40    < > = values in this interval not displayed.     No results found for: \"CPEPT\", \"GADAB\", \"ISCAB\"  Cholesterol   Date Value Ref Range Status   11/26/2024 209 (H) <200 mg/dL Final   11/10/2023 221 (H) <200 mg/dL Final   12/16/2020 184 <200 mg/dL Final   12/03/2019 205 (H) <200 mg/dL Final     Comment:     Desirable:       <200 mg/dl       Hemoglobin   Date Value Ref Range Status   06/18/2020 16.3 13.3 - 17.7 g/dL Final   ]      Most recent GFRs:  GFR Estimate   Date Value Ref Range Status   11/26/2024 >90 >60 mL/min/1.73m2 Final     Comment:     eGFR calculated using 2021 CKD-EPI equation.   11/10/2023 >90 >60 mL/min/1.73m2 Final   10/21/2022 >90 >60 mL/min/1.73m2 Final     Comment:     Effective December 21, 2021 eGFRcr in adults is calculated using the 2021 CKD-EPI creatinine equation which includes age and gender (Rocio blanco al., NEJM, DOI: 10.1056/SMLYdf5346930)   12/16/2020 >90 >60 mL/min/[1.73_m2] Final     Comment:     Non  GFR Calc  Starting 12/18/2018, serum creatinine based estimated GFR (eGFR) will be   calculated using the Chronic Kidney Disease Epidemiology Collaboration   (CKD-EPI) equation.     06/18/2020 35 (L) >60 mL/min/[1.73_m2] Final     Comment:     Non  GFR Calc  Starting 12/18/2018, serum creatinine based estimated GFR (eGFR) will be   calculated using the Chronic Kidney Disease Epidemiology Collaboration   (CKD-EPI) equation.     12/03/2019 >90 >60 mL/min/[1.73_m2] Final     Comment:     Non  GFR Calc  Starting 12/18/2018, serum creatinine based estimated " "GFR (eGFR) will be   calculated using the Chronic Kidney Disease Epidemiology Collaboration   (CKD-EPI) equation.         No results found for: \"CPEPT\", \"GADAB\", \"ISCAB\"  AST   Date Value Ref Range Status   02/14/2024 31 0 - 45 U/L Final     Comment:     Reference intervals for this test were updated on 6/12/2023 to more accurately reflect our healthy population. There may be differences in the flagging of prior results with similar values performed with this method. Interpretation of those prior results can be made in the context of the updated reference intervals.   06/18/2020 41 0 - 45 U/L Final     Lab Results   Component Value Date    ALT 51 02/14/2024    ALT 42 06/18/2020             Assessment/Plan:    Type 1 Diabetes with Diabetic Retinopathy  Blood glucose control: Not at goal of A1C <7. Limited data available today, manual blood glucose checks show highly variable values. Not able to download insulin pump report today in clinic. Due for A1C. Referred to Diabetes Education for assistance with Guardian CGM sensor for use with Versafe 780G insulin pump and for insulin pump settings adjustments as needed. Hypoglycemia protocol reviewed. No changes made to insulin pump settings today:    Current Pump Settings:   Basal  I:CHO  ISF  12 am 0.9u/h  1:14  50  5 am 0.8u/h  1:11  43  10 am 0.8u/h  1:13  43  4:30pm 0.8u/h  1:11  38  5 pm 1.1u/h  1:11  38  7 pm 0.9u/h  1:11  38  10 pm 0.9u/h  1:12  43    DM Complications-   Retinopathy:  Yes per patient, reminded due for eye exam June 2025.   Nephropathy:  BP elevated today. No microalbuminuria.  Creatinine stable.   Neuropathy: No.  Normal monofilament today.  Feet: OK, no ulcers or lesions.   Lipids: 40-74 yo and statin is reccomended. Patient is taking a statin.     2. Hypertension  Above goal today. Scheduled to establish care with new PCP Dr. Figueroa 5/7. Counseled to check home BP and bring log to upcoming PCP appointment.    3. Hyperlipidemia  On statin. " LDL 96 on 11/26/2024. Encouraged to bring medication bottles to upcoming PCP appointment for refills and to review doses.    4. Diabetic retinopathy  Per patient report, Ophthalmology report not available in EHR. Reminded to follow up with ophthalmology in June 2025.    Follow up in 2 months.    The longitudinal plan of care for the diagnosis(es)/condition(s) as documented were addressed during this visit. Due to the added complexity in care, I will continue to support Enmanuel in the subsequent management and with ongoing continuity of care.    53 minutes spent on the date of the encounter doing chart review, history and exam, documentation, education and counseling, as well as communication and coordination of care, and further activities as noted above.  This time excludes time spent reviewing CGM.      It is my privilege to be involved in the care of the above patient.     DIANE Sampson, CNP

## 2025-04-29 NOTE — LETTER
4/29/2025      Enmanuel Mckeon  [address]  [facility] [address]      Dear Colleague,    Thank you for referring your patient, Enmanuel Mckeon, to the [facility]. Please see a copy of my visit note below.             Diabetes Consult Note  April 29, 2025        Enmanuel Mckeon is a 64 year old male with Type 1 Diabetes here for a New Patient visit to establish care. He also has a history of HTN, HLD, C Diff.       HPI:   He has had diabetes since 1985. Has been on insulin pump therapy since 1983.     He has been using the Medtronic 780G for 3-4 years. Overall he is satisfied with the pump.     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 Lately he has been less active due to less work.    Previously followed by Dr. Yunier Thompson, prior endocrinologist at [facility]. Last seen Dr. Thompson in December 2024, seeking new endo due to changes to insurance acceptance through [facility] per patient. Records not yet available to review in EHR.    Most recent A1C 8.2 11/26/2024, prior control 7.6% - 8.8% since 2018.    Typical Day:  Gets up at 7, checks blood glucose. Eats toast with peanut butter and large coffee with cream.  Eats lunch, sandwich if at home, or on the road if working  Eats supper at 5 pm, wife cooks, I.e. spagghetti   Snacks - Carries snack bars if blood glucose getting low.  Drinks Water, takes with when working. No pop.    Activity: Walks dog in the morning 1/4 mile. No intentional exercise at this time.    Lives with wife in [place].    Current Diabetes Medications:   Medtronic 780g  insulin pump with Novolog     We reviewed glucometer data from the last 2 weeks together.  It revealed:  FBG   Before lunch:   Before dinner: 215-398  Bedtime: 134-333    Highly variable readings with some lows in the 60s  fasting and before lunch.    Current Pump Settings:   Basal  I:CHO  ISF  12 am 0.9u/h  1:14  50  5  "am 0.8u/h  1:11  43  10 am 0.8u/h  1:13  43  4:30pm 0.8u/h  1:11  38  5 pm 1.1u/h  1:11  38  7 pm 0.9u/h  1:11  38  10 pm 0.9u/h  1:12  43    20.8 total units daily basal.    Glucose targets 100-130    Active insulin time 2:30      Basal insulin in case of pump failure: Does not have  Glucagon: Has some in fridge but probably old  Ketone strips: Has some but may be old      Ability to sense low blood sugars:  Has symptoms <70. Feels chest tightness, lightheadedness.     History of Diabetes monitoring and complications/ prevention:  CAD: No  Last eye exam results: June 2024, + Has not needed treatments. No recent vision changes.  Last dental exam: 2 weeks ago.  Neuropathy: None  Nephropathy: No, negative microalbumin 11/2024  HTN: Above goal today.  On statin: Yes simvastatin 40 mg  Depression: No  Feet: No ulcers or lesions per patient.    Enmanuel's PMH, PSH and allergies were reviewed today and pertinent information is summarized above.    Enmanuel's pertinent social and family history are also reviewed today and pertinent information is summarized above.    Current Outpatient Medications   Medication Sig Dispense Refill     aspirin 81 MG tablet Take 2 tablets (162 mg) by mouth daily 100 tablet 3     bisacodyl (DULCOLAX) 5 MG EC tablet Take 2 tablets at 3 pm the day before your procedure. If your procedure is before 11 am, take 2 additional tablets at 11 pm. If your procedure is after 11 am, take 2 additional tablets at 6 am. For additional instructions refer to your colonoscopy prep instructions. 4 tablet 0     blood glucose (NO BRAND SPECIFIED) test strip 5 times daily 100 each 0     cetirizine (ZYRTEC) 10 MG tablet Take 1 tablet (10 mg) by mouth daily 90 tablet 0     fluticasone (FLONASE) 50 MCG/ACT nasal spray Spray 1-2 sprays into both nostrils daily 16 g 11     Insulin Aspart (NOVOLOG SC)        INSULIN SYRINGE-NEEDLE U-100 MISC 0.25 ML 29 X 1/2\"  insulin pump       lisinopril (ZESTRIL) 40 MG tablet Take 0.5 " "tablets (20 mg) by mouth daily. 15 tablet 0     multivitamin w/minerals (THERA-VIT-M) tablet Take 1 tablet by mouth daily 100 tablet 3     polyethylene glycol (GOLYTELY) 236 g suspension The night before the exam at 6 pm drink an 8-ounce glass every 15 minutes until the jug is half empty. If you arrive before 11 AM: Drink the other half of the Golytely jug at 11 PM night before procedure. If you arrive after 11 AM: Drink the other half of the Golytely jug at 6 AM day of procedure. For additional instructions refer to your colonoscopy prep instructions. 4000 mL 0     simvastatin (ZOCOR) 20 MG tablet Take 1 tablet (20 mg) by mouth at bedtime. 30 tablet 0     simvastatin (ZOCOR) 40 MG tablet Take 1 tablet (40 mg) by mouth at bedtime       No current facility-administered medications for this visit.         ROS:   Reports good physical activity tolerance.  Denies any pedal lesions or vision changes or concerns. Denies any other acute concerns except as noted above.      Exam:    BP (!) 151/97   Pulse 88   Wt 92.9 kg (204 lb 12.8 oz)   SpO2 95%   BMI 28.56 kg/m    Wt Readings from Last 10 Encounters:   04/29/25 92.9 kg (204 lb 12.8 oz)   04/24/25 90.7 kg (200 lb)   03/28/24 87.5 kg (193 lb)   02/06/24 87.5 kg (193 lb)   12/27/23 90.7 kg (200 lb)   05/19/22 91.6 kg (202 lb)   07/19/21 88 kg (194 lb)   06/18/20 90.7 kg (200 lb)   01/22/19 89.4 kg (197 lb)   11/12/18 92.1 kg (203 lb)     Estimated body mass index is 27.89 kg/m  as calculated from the following:    Height as of 4/24/25: 1.803 m (5' 11\").    Weight as of 4/24/25: 90.7 kg (200 lb).    Physical Exam:  General: Pleasant, well nourished and hydrated male in NAD.   Psych:  Mood is \"good,\" affect is appropriate.  Thought form and content are fluid and coherent.    HEENT: Eyes and sclera are clear. Extraocular movements are grossly intact without proptosis.    Neck: No masses or JVD are noted.    Resp: Easy and unlabored breathing.   Neuro: Alert and oriented, " "communicating clearly.   Ext: no swelling or edema.    Diabetic foot exam: normal DP and PT pulses, no trophic changes or ulcerative lesions, and normal sensory exam  Data:      Recent Labs   Lab Test 11/26/24  0903 11/26/24  0857 02/14/24  0902 11/10/23  1648 11/10/23  0819 10/21/22  0836 10/21/22  0827   A1C  --  8.2*  --   --  8.8*   < > 7.9*   TSH  --  1.23  --   --   --   --  1.55   LDL  --  96  --   --  112*   < > 90   HDL  --  96  --   --  91   < > 85   TRIG  --  86  --   --  88   < > 59   CR  --  0.80  --   --  0.83  --  0.75   MICROL <12.0  --   --  14.6  --    < >  --    AST  --   --  31  --   --   --  29   ALT  --   --  51  --   --   --  40    < > = values in this interval not displayed.     No results found for: \"CPEPT\", \"GADAB\", \"ISCAB\"  Cholesterol   Date Value Ref Range Status   11/26/2024 209 (H) <200 mg/dL Final   11/10/2023 221 (H) <200 mg/dL Final   12/16/2020 184 <200 mg/dL Final   12/03/2019 205 (H) <200 mg/dL Final     Comment:     Desirable:       <200 mg/dl       Hemoglobin   Date Value Ref Range Status   06/18/2020 16.3 13.3 - 17.7 g/dL Final   ]      Most recent GFRs:  GFR Estimate   Date Value Ref Range Status   11/26/2024 >90 >60 mL/min/1.73m2 Final     Comment:     eGFR calculated using 2021 CKD-EPI equation.   11/10/2023 >90 >60 mL/min/1.73m2 Final   10/21/2022 >90 >60 mL/min/1.73m2 Final     Comment:     Effective December 21, 2021 eGFRcr in adults is calculated using the 2021 CKD-EPI creatinine equation which includes age and gender (Rocio blanco al., NEJM, DOI: 10.1056/XAEGqk8618719)   12/16/2020 >90 >60 mL/min/[1.73_m2] Final     Comment:     Non  GFR Calc  Starting 12/18/2018, serum creatinine based estimated GFR (eGFR) will be   calculated using the Chronic Kidney Disease Epidemiology Collaboration   (CKD-EPI) equation.     06/18/2020 35 (L) >60 mL/min/[1.73_m2] Final     Comment:     Non  GFR Calc  Starting 12/18/2018, serum creatinine based estimated " "GFR (eGFR) will be   calculated using the Chronic Kidney Disease Epidemiology Collaboration   (CKD-EPI) equation.     12/03/2019 >90 >60 mL/min/[1.73_m2] Final     Comment:     Non  GFR Calc  Starting 12/18/2018, serum creatinine based estimated GFR (eGFR) will be   calculated using the Chronic Kidney Disease Epidemiology Collaboration   (CKD-EPI) equation.         No results found for: \"CPEPT\", \"GADAB\", \"ISCAB\"  AST   Date Value Ref Range Status   02/14/2024 31 0 - 45 U/L Final     Comment:     Reference intervals for this test were updated on 6/12/2023 to more accurately reflect our healthy population. There may be differences in the flagging of prior results with similar values performed with this method. Interpretation of those prior results can be made in the context of the updated reference intervals.   06/18/2020 41 0 - 45 U/L Final     Lab Results   Component Value Date    ALT 51 02/14/2024    ALT 42 06/18/2020             Assessment/Plan:    Type 1 Diabetes with Diabetic Retinopathy  Blood glucose control: Not at goal of A1C <7. Limited data available today, manual blood glucose checks show highly variable values. Not able to download insulin pump report today in clinic. Due for A1C. Referred to Diabetes Education for assistance with Guardian CGM sensor for use with [employer] 780G insulin pump and for insulin pump settings adjustments as needed. Hypoglycemia protocol reviewed. No changes made to insulin pump settings today:    Current Pump Settings:   Basal  I:CHO  ISF  12 am 0.9u/h  1:14  50  5 am 0.8u/h  1:11  43  10 am 0.8u/h  1:13  43  4:30pm 0.8u/h  1:11  38  5 pm 1.1u/h  1:11  38  7 pm 0.9u/h  1:11  38  10 pm 0.9u/h  1:12  43    DM Complications-   Retinopathy:  Yes per patient, reminded due for eye exam June 2025.   Nephropathy:  BP elevated today. No microalbuminuria.  Creatinine stable.   Neuropathy: No.  Normal monofilament today.  Feet: OK, no ulcers or lesions.   Lipids: 40-76 yo " and statin is reccomended. Patient is taking a statin.     2. Hypertension  Above goal today. Scheduled to establish care with new PCP Dr. Figueroa 5/7. Counseled to check home BP and bring log to upcoming PCP appointment.    3. Hyperlipidemia  On statin. LDL 96 on 11/26/2024. Encouraged to bring medication bottles to upcoming PCP appointment for refills and to review doses.    4. Diabetic retinopathy  Per patient report, Ophthalmology report not available in EHR. Reminded to follow up with ophthalmology in June 2025.    Follow up in 2 months.    The longitudinal plan of care for the diagnosis(es)/condition(s) as documented were addressed during this visit. Due to the added complexity in care, I will continue to support Enmanuel in the subsequent management and with ongoing continuity of care.    53 minutes spent on the date of the encounter doing chart review, history and exam, documentation, education and counseling, as well as communication and coordination of care, and further activities as noted above.  This time excludes time spent reviewing CGM.      It is my privilege to be involved in the care of the above patient.     DIANE Sampson, CNP      4/29  0713-  63    4/28    717pm 201  424pm-215  11am- 125  356am  180    4/27  739pm 220  543pm  417  957am  65    4/26  526 pm 287  1234pm 119  659am  229    4/25  944pm 124  447pm-205    4/24  1255pm 71  831am 289  0624am 140  550am 64    4/23  611pm 134  343pm 111  116 pm 62  949am 285  701am 249    4/22  1057pm 299  215pm 328  644am 177    4/21  747pm 398  732am 100  227am 140    4/20  451pm 295  930pm 192  717am 199    4/19  415 294  632am 204    4/18  957pm 198  328pm 208  106pm-30753  1106am  136  0631am 244    4/17  751pm 169  432 223 1049am 291   533am 122    4/16  813pm 333  419pm 198  807 348  1203am 302    4/15  506pm 275  220pm 211  1114am 187      Again, thank you for allowing me to participate in the care of your patient.         Sincerely,        DEBBI Sampson CNP    Electronically signed

## 2025-04-29 NOTE — PROGRESS NOTES
4/29  0713-  64    4/28    717pm 201  424pm-215  11am- 125  356am  180    4/27  739pm 220  543pm  417  957am  65    4/26  526 pm 287  1234pm 119  659am  229    4/25  944pm 124  447pm-205    4/24  1255pm 71  831am 289  0624am 140  550am 64    4/23  611pm 134  343pm 111  116 pm 62  949am 285  701am 249    4/22  1057pm 299  215pm 328  644am 177    4/21  747pm 398  732am 100  227am 140    4/20  451pm 295  930pm 192  717am 199    4/19  415 294  632am 204    4/18  957pm 198  328pm 208  106pm-28702  1106am  136  0631am 244    4/17  751pm 169  432 223 1049am 291   533am 122    4/16  813pm 333  419pm 198  807 348  1203am 302    4/15  506pm 275  220pm 211  1114am 187

## 2025-04-30 ENCOUNTER — PATIENT OUTREACH (OUTPATIENT)
Dept: CARE COORDINATION | Facility: CLINIC | Age: 65
End: 2025-04-30
Payer: COMMERCIAL

## 2025-04-30 PROBLEM — E10.319: Status: ACTIVE | Noted: 2025-04-30

## 2025-05-01 ENCOUNTER — TELEPHONE (OUTPATIENT)
Dept: FAMILY MEDICINE | Facility: CLINIC | Age: 65
End: 2025-05-01
Payer: COMMERCIAL

## 2025-05-01 DIAGNOSIS — E10.9 TYPE 1 DIABETES, HBA1C GOAL < 7% (H): Primary | ICD-10-CM

## 2025-05-01 RX ORDER — PEN NEEDLE, DIABETIC 30 GX3/16"
NEEDLE, DISPOSABLE MISCELLANEOUS
Qty: 100 EACH | Refills: 1 | Status: SHIPPED | OUTPATIENT
Start: 2025-05-01

## 2025-05-01 NOTE — TELEPHONE ENCOUNTER
Provider wrote for Lantus pens but did not send order for Pen needles . Could provider please send script      Miroslava Denneyles  Pharmacy Tech.  [facility]  [phone]

## 2025-05-07 ENCOUNTER — OFFICE VISIT (OUTPATIENT)
Dept: FAMILY MEDICINE | Facility: CLINIC | Age: 65
End: 2025-05-07
Attending: NURSE PRACTITIONER
Payer: COMMERCIAL

## 2025-05-07 VITALS
HEIGHT: 70 IN | SYSTOLIC BLOOD PRESSURE: 138 MMHG | DIASTOLIC BLOOD PRESSURE: 70 MMHG | WEIGHT: 201.4 LBS | TEMPERATURE: 98.3 F | BODY MASS INDEX: 28.83 KG/M2 | RESPIRATION RATE: 12 BRPM | HEART RATE: 76 BPM | OXYGEN SATURATION: 96 %

## 2025-05-07 DIAGNOSIS — E78.5 HYPERLIPIDEMIA LDL GOAL <100: ICD-10-CM

## 2025-05-07 DIAGNOSIS — Z76.89 ENCOUNTER TO ESTABLISH CARE: ICD-10-CM

## 2025-05-07 DIAGNOSIS — E10.9 TYPE 1 DIABETES, HBA1C GOAL < 7% (H): ICD-10-CM

## 2025-05-07 DIAGNOSIS — N52.9 ERECTILE DYSFUNCTION, UNSPECIFIED ERECTILE DYSFUNCTION TYPE: ICD-10-CM

## 2025-05-07 DIAGNOSIS — Z82.49 FAMILY HISTORY OF ISCHEMIC HEART DISEASE: ICD-10-CM

## 2025-05-07 DIAGNOSIS — E10.319 DIABETIC RETINOPATHY ASSOCIATED WITH TYPE 1 DIABETES MELLITUS, MACULAR EDEMA PRESENCE UNSPECIFIED, UNSPECIFIED LATERALITY, UNSPECIFIED RETINOPATHY SEVERITY (H): ICD-10-CM

## 2025-05-07 DIAGNOSIS — I10 HYPERTENSION GOAL BP (BLOOD PRESSURE) < 140/90: Primary | ICD-10-CM

## 2025-05-07 PROCEDURE — 99214 OFFICE O/P EST MOD 30 MIN: CPT | Performed by: STUDENT IN AN ORGANIZED HEALTH CARE EDUCATION/TRAINING PROGRAM

## 2025-05-07 PROCEDURE — G2211 COMPLEX E/M VISIT ADD ON: HCPCS | Performed by: STUDENT IN AN ORGANIZED HEALTH CARE EDUCATION/TRAINING PROGRAM

## 2025-05-07 RX ORDER — TADALAFIL 10 MG/1
10-20 TABLET ORAL DAILY PRN
Qty: 20 TABLET | Refills: 1 | Status: SHIPPED | OUTPATIENT
Start: 2025-05-07

## 2025-05-07 RX ORDER — HYDROCHLOROTHIAZIDE 25 MG/1
25 TABLET ORAL DAILY
COMMUNITY
Start: 2025-02-19

## 2025-05-07 ASSESSMENT — PAIN SCALES - GENERAL: PAINLEVEL_OUTOF10: NO PAIN (0)

## 2025-05-07 NOTE — PROGRESS NOTES
"  Assessment & Plan   Problem List Items Addressed This Visit          Endocrine    TYPE 1 DIABETES, HBA1C GOAL < 7%    Hyperlipidemia LDL goal <100    Diabetic retinopathy associated with type 1 diabetes mellitus, macular edema presence unspecified, unspecified laterality, unspecified retinopathy severity (H)    Relevant Orders    Adult Eye  Referral       Circulatory    Hypertension goal BP (blood pressure) < 140/90 - Primary     Other Visit Diagnoses         Encounter to establish care          Erectile dysfunction, unspecified erectile dysfunction type        Relevant Medications    tadalafil (CIALIS) 10 MG tablet    Other Relevant Orders    Testosterone, total      Family history of ischemic heart disease               History reviewed and updated with patient establishing care.  Continues to follow with endocrinology and needs repeat A1c.  He plans to focus on diet and exercise.  Endocrinology managing the pump settings.  Ongoing erectile dysfunction which I suspect is vascular and neurogenic related in the setting of his diabetes.  Will add testosterone given slight decrease in sex drive and plan for early morning study.  Will give trial of Cialis and see if this is more beneficial for him.  If testing negative and Cialis not helpful would recommend he follow-up with urology for consideration of injections, he does need to establish with new ophthalmologist.  Overall he is feeling well.    The longitudinal plan of care for the diagnosis(es)/condition(s) as documented were addressed during this visit. Due to the added complexity in care, I will continue to support Enmanuel in the subsequent management and with ongoing continuity of care.       BMI  Estimated body mass index is 28.9 kg/m  as calculated from the following:    Height as of this encounter: 1.778 m (5' 10\").    Weight as of this encounter: 91.4 kg (201 lb 6.4 oz).   Weight management plan: Discussed healthy diet and exercise " guidelines        Subjective   Enmanuel is a 64 year old, presenting for the following health issues:  [facility]        5/7/2025     8:20 AM   Additional Questions   Roomed by Fay VIRK         5/7/2025     8:20 AM   Patient Reported Additional Medications   Patient reports taking the following new medications viagra     History of Present Illness       Diabetes:   He presents for follow up of diabetes.  He is checking home blood glucose four or more times daily.   He checks blood glucose before meals.  Blood glucose is sometimes over 200 and sometimes under 70. He is aware of hypoglycemia symptoms including weakness.    He has no concerns regarding his diabetes at this time.   He is not experiencing numbness or burning in feet, excessive thirst, blurry vision, weight changes or redness, sores or blisters on feet. The patient has not had a diabetic eye exam in the last 12 months.          Hyperlipidemia:  He presents for follow up of hyperlipidemia.   He is taking medication to lower cholesterol. He is not having myalgia or other side effects to statin medications.    Hypertension: He presents for follow up of hypertension.  He does not check blood pressure  regularly outside of the clinic. Outside blood pressures have been over 140/90. He follows a low salt diet.     Hypothyroidism:     Since last visit, patient describes the following symptoms::  None    He eats 0-1 servings of fruits and vegetables daily.He consumes 0 sweetened beverage(s) daily.He exercises with enough effort to increase his heart rate 9 or less minutes per day.  He exercises with enough effort to increase his heart rate 3 or less days per week.   He is taking medications regularly.        Patient with Mini med pump. No symptomatic lows. He has recently started seeing a new endocrinology provider. He notes he can improve in diet and exercise.     He does need an eye exam. He does note Ed for quite some time. Sildenafil no longer helpful. Sex  "drive slightly down. Weight stable. Trouble getting and maintaining erection. No previous surgeries.       Review of Systems  Constitutional, HEENT, cardiovascular, pulmonary, GI, , musculoskeletal, neuro, skin, endocrine and psych systems are negative, except as otherwise noted.      Objective    /70   Pulse 76   Temp 98.3  F (36.8  C) (Temporal)   Resp 12   Ht 1.778 m (5' 10\")   Wt 91.4 kg (201 lb 6.4 oz)   SpO2 96%   BMI 28.90 kg/m    Body mass index is 28.9 kg/m .  Physical Exam   GENERAL: alert and no distress  EYES: Eyes grossly normal to inspection, PERRL and conjunctivae and sclerae normal  HENT: nose and mouth without ulcers or lesions  NECK: no adenopathy, no asymmetry, masses, or scars  RESP: lungs clear to auscultation - no rales, rhonchi or wheezes  CV: regular rate and rhythm, normal S1 S2, no peripheral edema  MS: no gross musculoskeletal defects noted, no edema  SKIN: no suspicious lesions or rashes  NEURO:  mentation intact and speech normal  PSYCH: mentation appears normal, affect normal/bright          Signed Electronically by: Kieran Figueroa MD    "

## 2025-05-08 ENCOUNTER — PATIENT OUTREACH (OUTPATIENT)
Dept: CARE COORDINATION | Facility: CLINIC | Age: 65
End: 2025-05-08
Payer: COMMERCIAL

## 2025-05-12 ENCOUNTER — PATIENT OUTREACH (OUTPATIENT)
Dept: CARE COORDINATION | Facility: CLINIC | Age: 65
End: 2025-05-12
Payer: COMMERCIAL

## 2025-05-21 DIAGNOSIS — E10.9 TYPE 1 DIABETES, HBA1C GOAL < 7% (H): Primary | ICD-10-CM

## 2025-05-22 ENCOUNTER — LAB (OUTPATIENT)
Dept: LAB | Facility: CLINIC | Age: 65
End: 2025-05-22
Payer: COMMERCIAL

## 2025-05-22 DIAGNOSIS — N52.9 ERECTILE DYSFUNCTION, UNSPECIFIED ERECTILE DYSFUNCTION TYPE: ICD-10-CM

## 2025-05-22 RX ORDER — INSULIN ASPART 100 [IU]/ML
INJECTION, SOLUTION INTRAVENOUS; SUBCUTANEOUS
Qty: 50 ML | Refills: 3 | Status: SHIPPED | OUTPATIENT
Start: 2025-05-22

## 2025-05-28 LAB — TESTOST SERPL-MCNC: 568 NG/DL (ref 240–950)

## 2025-05-30 ENCOUNTER — RESULTS FOLLOW-UP (OUTPATIENT)
Dept: FAMILY MEDICINE | Facility: CLINIC | Age: 65
End: 2025-05-30

## 2025-06-03 ENCOUNTER — HOSPITAL ENCOUNTER (OUTPATIENT)
Dept: NUCLEAR MEDICINE | Facility: CLINIC | Age: 65
Setting detail: NUCLEAR MEDICINE
Discharge: HOME | End: 2025-06-03
Attending: STUDENT IN AN ORGANIZED HEALTH CARE EDUCATION/TRAINING PROGRAM
Payer: COMMERCIAL

## 2025-06-03 ENCOUNTER — HOSPITAL ENCOUNTER (OUTPATIENT)
Dept: CARDIOLOGY | Facility: CLINIC | Age: 65
Setting detail: NUCLEAR MEDICINE
Discharge: HOME | End: 2025-06-03
Attending: STUDENT IN AN ORGANIZED HEALTH CARE EDUCATION/TRAINING PROGRAM
Payer: COMMERCIAL

## 2025-06-03 DIAGNOSIS — Z87.898 HX OF CHEST PAIN: ICD-10-CM

## 2025-06-03 LAB
CV STRESS MAX HR HE: 107
NUC STRESS EJECTION FRACTION: 68 %
RATE PRESSURE PRODUCT: NORMAL
STRESS ECHO BASELINE DIASTOLIC HE: 74
STRESS ECHO BASELINE HR: 76 BPM
STRESS ECHO BASELINE SYSTOLIC BP: 142
STRESS ECHO CALCULATED PERCENT HR: 69 %
STRESS ECHO LAST STRESS DIASTOLIC BP: 78
STRESS ECHO LAST STRESS SYSTOLIC BP: 140
STRESS ECHO TARGET HR: 156
STRESS/REST PERFUSION RATIO: 0.97

## 2025-06-03 PROCEDURE — 78452 HT MUSCLE IMAGE SPECT MULT: CPT

## 2025-06-03 PROCEDURE — A9502 TC99M TETROFOSMIN: HCPCS | Performed by: STUDENT IN AN ORGANIZED HEALTH CARE EDUCATION/TRAINING PROGRAM

## 2025-06-03 PROCEDURE — 250N000011 HC RX IP 250 OP 636: Mod: JZ | Performed by: STUDENT IN AN ORGANIZED HEALTH CARE EDUCATION/TRAINING PROGRAM

## 2025-06-03 PROCEDURE — 343N000001 HC RX 343 MED OP 636: Performed by: STUDENT IN AN ORGANIZED HEALTH CARE EDUCATION/TRAINING PROGRAM

## 2025-06-03 RX ORDER — REGADENOSON 0.08 MG/ML
0.4 INJECTION, SOLUTION INTRAVENOUS ONCE
Status: COMPLETED | OUTPATIENT
Start: 2025-06-03 | End: 2025-06-03

## 2025-06-03 RX ADMIN — TETROFOSMIN 10.5 MILLICURIE: 1.38 INJECTION, POWDER, LYOPHILIZED, FOR SOLUTION INTRAVENOUS at 08:31

## 2025-06-03 RX ADMIN — REGADENOSON 0.4 MG: 0.08 INJECTION, SOLUTION INTRAVENOUS at 10:25

## 2025-06-03 RX ADMIN — TETROFOSMIN 33 MILLICURIE: 1.38 INJECTION, POWDER, LYOPHILIZED, FOR SOLUTION INTRAVENOUS at 11:00

## 2025-06-14 ENCOUNTER — HEALTH MAINTENANCE LETTER (OUTPATIENT)
Age: 65
End: 2025-06-14

## 2025-06-18 ENCOUNTER — LAB (OUTPATIENT)
Dept: LAB | Facility: CLINIC | Age: 65
End: 2025-06-18
Payer: COMMERCIAL

## 2025-06-18 DIAGNOSIS — E10.9 TYPE 1 DIABETES, HBA1C GOAL < 7% (H): ICD-10-CM

## 2025-06-18 LAB
EST. AVERAGE GLUCOSE BLD GHB EST-MCNC: 203 MG/DL
HBA1C MFR BLD: 8.7 %

## 2025-06-18 PROCEDURE — 36415 COLL VENOUS BLD VENIPUNCTURE: CPT

## 2025-06-18 PROCEDURE — 83036 HEMOGLOBIN GLYCOSYLATED A1C: CPT

## 2025-06-19 ENCOUNTER — RESULTS FOLLOW-UP (OUTPATIENT)
Dept: ENDOCRINOLOGY | Facility: CLINIC | Age: 65
End: 2025-06-19

## 2025-06-25 ENCOUNTER — OFFICE VISIT (OUTPATIENT)
Dept: ENDOCRINOLOGY | Facility: CLINIC | Age: 65
End: 2025-06-25
Payer: COMMERCIAL

## 2025-06-25 VITALS — SYSTOLIC BLOOD PRESSURE: 136 MMHG | OXYGEN SATURATION: 99 % | DIASTOLIC BLOOD PRESSURE: 78 MMHG | HEART RATE: 85 BPM

## 2025-06-25 DIAGNOSIS — E10.9 TYPE 1 DIABETES, HBA1C GOAL < 7% (H): Primary | ICD-10-CM

## 2025-06-25 PROCEDURE — G2211 COMPLEX E/M VISIT ADD ON: HCPCS | Performed by: NURSE PRACTITIONER

## 2025-06-25 PROCEDURE — 99215 OFFICE O/P EST HI 40 MIN: CPT | Performed by: NURSE PRACTITIONER

## 2025-06-25 NOTE — PROGRESS NOTES
Diabetes Consult Note  June 25, 2025        Enmanuel Mckeon is a 65 year old male with Type 1 Diabetes here for follow up. He also has a history of HTN, HLD, C Diff.       HPI:   He has had diabetes since 1985. Has been on insulin pump therapy since 1983.     He has been using the Medtronic 780G for 3-4 years. Overall he is satisfied with the pump.     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 Lately he has been less active due to less work/semi-retired.    Previously followed by Dr. Yunier Thompson, prior endocrinologist at [facility]. Last seen Dr. Thompson in December 2024, changed endo due to changes to insurance acceptance through [facility] per patient. Records not yet available to review in EHR.    Most recent A1C 8.7 on 6/18/2025, previously 8.2 11/26/2024, prior control 7.6% - 8.8% since 2018.    At last visit 4/29/2025 we did not make changes to insulin pump settings. I placed a referral to Diabetes Education for help getting started on Guardian CGM with Medtronic pump to transition to integrated insulin pump therapy. Basal insulin, ketone strips, and glucagon ordered at last visit.    In the interim, patient has not seen Diabetes Education and is still using AccuChek. He did establish with new PCP, Dr. Kieran Figueroa. A1C higher at 8.7 on 6/18/2025.    Today, patient is still interested in starting Guardian sensor and integrated pump therapy. Denies changes to health status or lifestyle or daily patterns.      Typical Day:  Gets up at 7, checks blood glucose. Eats toast with peanut butter and large coffee with cream.  Eats lunch, sandwich if at home, or on the road if working  Eats supper at 5 pm, wife cooks, I.e. spagghetti   Snacks - Carries snack bars if blood glucose getting low.  Drinks Water, takes with when working. No pop.    Activity: Walks dog in the morning 1/4 mile. No intentional exercise at this time.    Lives with wife in  [place].    Current Diabetes Medications:   Medtronic 780g insulin pump with Novolog     See attached note for pump download and blood glucose data.    We reviewed glucometer  and pump data from the last 2 weeks together.  It revealed:  FBG   Before lunch:   Before dinner:   Bedtime:     Highly variable readings with some lows at varying times of the day. On review he can states he knows why he goes low when that happens, often due to overcorrecting/over-bolusing for meals.    Current Pump Settings:   Basal  I:CHO  ISF  12 am 0.9u/h  1:14  50  5 am 0.8u/h  1:11  43  10 am 0.8u/h  1:13  43  4:30pm 0.8u/h  1:11  38  5 pm 1.1u/h  1:11  38  7 pm 0.9u/h  1:11  38  10 pm 0.9u/h  1:12  43    20.8 total units daily basal.    Glucose targets 100-130    Active insulin time 2:30        Ability to sense low blood sugars:  Has symptoms <70. Feels chest tightness, lightheadedness.     History of Diabetes monitoring and complications/ prevention:  CAD: No  Last eye exam results: May 2025, +DR. Has not needed treatments. No recent vision changes.   Last dental exam: A couple months ago.  Neuropathy: None   Nephropathy: No, negative microalbumin 11/2024  HTN: Above goal today.  On statin: Yes simvastatin 40 mg  Depression: No  Feet: No ulcers or lesions per patient.    Enmanuel's PMH, PSH and allergies were reviewed today and pertinent information is summarized above.    Enmanuel's pertinent social and family history are also reviewed today and pertinent information is summarized above.    Current Outpatient Medications   Medication Sig Dispense Refill    acetone urine (KETOSTIX) test strip Check urine ketones if nausea and vomiting with significant hyperglycemia as directed. 100 strip 2    aspirin 81 MG tablet Take 2 tablets (162 mg) by mouth daily 100 tablet 3    bisacodyl (DULCOLAX) 5 MG EC tablet Take 2 tablets at 3 pm the day before your procedure. If your procedure is before 11 am, take 2 additional tablets  "at 11 pm. If your procedure is after 11 am, take 2 additional tablets at 6 am. For additional instructions refer to your colonoscopy prep instructions. (Patient not taking: Reported on 5/30/2025) 4 tablet 0    blood glucose (NO BRAND SPECIFIED) test strip Use to test blood sugar 4 times daily or as directed. 600 strip 4    blood glucose (NO BRAND SPECIFIED) test strip 5 times daily 100 each 0    cetirizine (ZYRTEC) 10 MG tablet Take 1 tablet (10 mg) by mouth daily 90 tablet 0    fluticasone (FLONASE) 50 MCG/ACT nasal spray Spray 1-2 sprays into both nostrils daily 16 g 11    Glucagon (BAQSIMI) 3 MG/DOSE nasal powder Spray 1 spray (3 mg) in nostril as needed for low blood sugar. in the event of unconscious hypoglycemia or hypoglycemic seizure. May repeat dose if no response after 15 minutes. 1 each 2    hydrochlorothiazide (HYDRODIURIL) 25 MG tablet Take 1 tablet (25 mg) by mouth daily. 30 tablet 0    Insulin Aspart (NOVOLOG SC)       insulin glargine (LANTUS PEN) 100 UNIT/ML pen Inject 20 units subcutaneous in the event of insulin pump failure. 15 mL 3    Insulin Pen Needle (PEN NEEDLES) 32G X 4 MM MISC For use with Lantus pen in the event of insulin pump failure. 100 each 1    INSULIN SYRINGE-NEEDLE U-100 MISC 0.25 ML 29 X 1/2\"  insulin pump      lisinopril (ZESTRIL) 40 MG tablet Take 1 tablet (40 mg) by mouth daily. 90 tablet 3    multivitamin w/minerals (THERA-VIT-M) tablet Take 1 tablet by mouth daily 100 tablet 3    NOVOLOG VIAL 100 UNIT/ML soln USE 50 UNITS DAILY VIA INSULIN PUMP 50 mL 3    polyethylene glycol (GOLYTELY) 236 g suspension The night before the exam at 6 pm drink an 8-ounce glass every 15 minutes until the jug is half empty. If you arrive before 11 AM: Drink the other half of the Golytely jug at 11 PM night before procedure. If you arrive after 11 AM: Drink the other half of the Golytely jug at 6 AM day of procedure. For additional instructions refer to your colonoscopy prep instructions. " "(Patient not taking: Reported on 5/30/2025) 4000 mL 0    simvastatin (ZOCOR) 40 MG tablet Take 1 tablet (40 mg) by mouth at bedtime. 90 tablet 3    tadalafil (CIALIS) 10 MG tablet Take 1-2 tablets (10-20 mg) by mouth daily as needed (prior to sexual actiivity). 20 tablet 1     No current facility-administered medications for this visit.         ROS:   Reports good physical activity tolerance.  Denies any pedal lesions or vision changes or concerns. Denies any other acute concerns except as noted above.      Exam:    /78   Pulse 85   SpO2 99%   Wt Readings from Last 10 Encounters:   05/07/25 91.4 kg (201 lb 6.4 oz)   04/29/25 92.9 kg (204 lb 12.8 oz)   04/24/25 90.7 kg (200 lb)   03/28/24 87.5 kg (193 lb)   02/06/24 87.5 kg (193 lb)   12/27/23 90.7 kg (200 lb)   05/19/22 91.6 kg (202 lb)   07/19/21 88 kg (194 lb)   06/18/20 90.7 kg (200 lb)   01/22/19 89.4 kg (197 lb)     Estimated body mass index is 28.9 kg/m  as calculated from the following:    Height as of 5/7/25: 1.778 m (5' 10\").    Weight as of 5/7/25: 91.4 kg (201 lb 6.4 oz).    Physical Exam:  General: Pleasant, well nourished and hydrated male in [facility].   Psych:  Mood is \"good,\" affect is appropriate.  Thought form and content are fluid and coherent.    HEENT: Eyes and sclera are clear. Extraocular movements are grossly intact without proptosis.    Neck: No masses or JVD are noted.    Resp: Easy and unlabored breathing.   Neuro: Alert and oriented, communicating clearly.   Ext: no swelling or edema.      Data:      Recent Labs   Lab Test 06/18/25  0829 11/26/24  0903 11/26/24  0857 02/14/24  0902 11/10/23  1648 11/10/23  0819 10/21/22  0836 10/21/22  0827   A1C 8.7*  --  8.2*  --   --  8.8*   < > 7.9*   TSH  --   --  1.23  --   --   --   --  1.55   LDL  --   --  96  --   --  112*   < > 90   HDL  --   --  96  --   --  91   < > 85   TRIG  --   --  86  --   --  88   < > 59   CR  --   --  0.80  --   --  0.83  --  0.75   MICROL  --  <12.0  --   --  14.6  " "--    < >  --    AST  --   --   --  31  --   --   --  29   ALT  --   --   --  51  --   --   --  40    < > = values in this interval not displayed.     No results found for: \"CPEPT\", \"GADAB\", \"ISCAB\"  Cholesterol   Date Value Ref Range Status   11/26/2024 209 (H) <200 mg/dL Final   11/10/2023 221 (H) <200 mg/dL Final   12/16/2020 184 <200 mg/dL Final   12/03/2019 205 (H) <200 mg/dL Final     Comment:     Desirable:       <200 mg/dl       Hemoglobin   Date Value Ref Range Status   06/18/2020 16.3 13.3 - 17.7 g/dL Final   ]      Most recent GFRs:  GFR Estimate   Date Value Ref Range Status   11/26/2024 >90 >60 mL/min/1.73m2 Final     Comment:     eGFR calculated using 2021 CKD-EPI equation.   11/10/2023 >90 >60 mL/min/1.73m2 Final   10/21/2022 >90 >60 mL/min/1.73m2 Final     Comment:     Effective December 21, 2021 eGFRcr in adults is calculated using the 2021 CKD-EPI creatinine equation which includes age and gender (Rocio et al., [place], DOI: 10.1056/GSUQsw4238231)   12/16/2020 >90 >60 mL/min/[1.73_m2] Final     Comment:     Non  GFR Calc  Starting 12/18/2018, serum creatinine based estimated GFR (eGFR) will be   calculated using the Chronic Kidney Disease Epidemiology Collaboration   (CKD-EPI) equation.     06/18/2020 35 (L) >60 mL/min/[1.73_m2] Final     Comment:     Non  GFR Calc  Starting 12/18/2018, serum creatinine based estimated GFR (eGFR) will be   calculated using the Chronic Kidney Disease Epidemiology Collaboration   (CKD-EPI) equation.     12/03/2019 >90 >60 mL/min/[1.73_m2] Final     Comment:     Non  GFR Calc  Starting 12/18/2018, serum creatinine based estimated GFR (eGFR) will be   calculated using the Chronic Kidney Disease Epidemiology Collaboration   (CKD-EPI) equation.         No results found for: \"CPEPT\", \"GADAB\", \"ISCAB\"  AST   Date Value Ref Range Status   02/14/2024 31 0 - 45 U/L Final     Comment:     Reference intervals for this test were " updated on 6/12/2023 to more accurately reflect our healthy population. There may be differences in the flagging of prior results with similar values performed with this method. Interpretation of those prior results can be made in the context of the updated reference intervals.   06/18/2020 41 0 - 45 U/L Final     Lab Results   Component Value Date    ALT 51 02/14/2024    ALT 42 06/18/2020             Assessment/Plan:    Type 1 Diabetes with Diabetic Retinopathy  Blood glucose control: Not at goal of A1C <7. Highly variable home blood glucose values. Insulin pump data shows consistent boluses for meals.     Has not seen Diabetes Education to restart Guardian CGM sensor for integration with Medtronic insulin pump. I again encouraged this to improve blood glucose control, and patient is amenable to this plan.    Resent referral to Diabetes Education for assistance with Guardian CGM sensor for use with Medtronic 780G insulin pump and for insulin pump settings adjustments as needed, as well as general education and diet review. Hypoglycemia protocol reviewed. Reviewed avoiding stacking Novolog boluses. No changes made to insulin pump settings today:    Current Pump Settings:   Basal  I:CHO  ISF  12 am 0.9u/h  1:14  50  5 am 0.8u/h  1:11  43  10 am 0.8u/h  1:13  43  4:30pm 0.8u/h  1:11  38  5 pm 1.1u/h  1:11  38  7 pm 0.9u/h  1:11  38  10 pm 0.9u/h  1:12  43    DM Complications-   Retinopathy:  Yes per patient, up to date with eye exam.   Nephropathy:  BP borderline today. No microalbuminuria.  Creatinine stable.   Neuropathy: No.    Feet: OK, no ulcers or lesions.   Lipids: 40-74 yo and statin is reccomended. Patient is taking a statin.     2. Hypertension  Borderline today. Followed by PCP Dr. Figueroa. Counseled to check home BP and bring log to PCP appointments.    3. Hyperlipidemia  On statin. LDL 96 on 11/26/2024. Followed by PCP.    4. Diabetic retinopathy  Per patient report, Ophthalmology report not available  in EHR. Reminded to follow up with ophthalmology in June 2025.    Follow up in 2 months.    The longitudinal plan of care for the diagnosis(es)/condition(s) as documented were addressed during this visit. Due to the added complexity in care, I will continue to support Enmanuel in the subsequent management and with ongoing continuity of care.    40 minutes spent on the date of the encounter doing chart review, history and exam, documentation, education and counseling, as well as communication and coordination of care, and further activities as noted above.       It is my privilege to be involved in the care of the above patient.     DIANE Sampson, CNP

## 2025-06-25 NOTE — LETTER
6/25/2025      Enmanuel Mckeon  [address]  [facility] [address]      Dear Colleague,    Thank you for referring your patient, Enmanuel Mckeon, to the [facility]. Please see a copy of my visit note below.             Diabetes Consult Note  June 25, 2025        Enmanuel Mckeon is a 65 year old male with Type 1 Diabetes here for follow up. He also has a history of HTN, HLD, C Diff.       HPI:   He has had diabetes since 1985. Has been on insulin pump therapy since 1983.     He has been using the Medtronic 780G for 3-4 years. Overall he is satisfied with the pump.     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 Lately he has been less active due to less work/semi-retired.    Previously followed by Dr. Yunier Thompson, prior endocrinologist at [facility]. Last seen Dr. Thompson in December 2024, changed endo due to changes to insurance acceptance through [facility] per patient. Records not yet available to review in EHR.    Most recent A1C 8.7 on 6/18/2025, previously 8.2 11/26/2024, prior control 7.6% - 8.8% since 2018.    At last visit 4/29/2025 we did not make changes to insulin pump settings. I placed a referral to Diabetes Education for help getting started on Guardian CGM with Medtronic pump to transition to integrated insulin pump therapy. Basal insulin, ketone strips, and glucagon ordered at last visit.    In the interim, patient has not seen Diabetes Education and is still using AccuChek. He did establish with new PCP, Dr. Kieran Figueroa. A1C higher at 8.7 on 6/18/2025.    Today, patient is still interested in starting Guardian sensor and integrated pump therapy. Denies changes to health status or lifestyle or daily patterns.      Typical Day:  Gets up at 7, checks blood glucose. Eats toast with peanut butter and large coffee with cream.  Eats lunch, sandwich if at home, or on the road if working  Eats supper at 5 pm, wife cooks, I.e.  spagghetti   Snacks - Carries snack bars if blood glucose getting low.  Drinks Water, takes with when working. No pop.    Activity: Walks dog in the morning 1/4 mile. No intentional exercise at this time.    Lives with wife in [place].    Current Diabetes Medications:   Medtronic 780g insulin pump with Novolog     See attached note for pump download and blood glucose data.    We reviewed glucometer  and pump data from the last 2 weeks together.  It revealed:  FBG   Before lunch:   Before dinner:   Bedtime:     Highly variable readings with some lows at varying times of the day. On review he can states he knows why he goes low when that happens, often due to overcorrecting/over-bolusing for meals.    Current Pump Settings:   Basal  I:CHO  ISF  12 am 0.9u/h  1:14  50  5 am 0.8u/h  1:11  43  10 am 0.8u/h  1:13  43  4:30pm 0.8u/h  1:11  38  5 pm 1.1u/h  1:11  38  7 pm 0.9u/h  1:11  38  10 pm 0.9u/h  1:12  43    20.8 total units daily basal.    Glucose targets 100-130    Active insulin time 2:30        Ability to sense low blood sugars:  Has symptoms <70. Feels chest tightness, lightheadedness.     History of Diabetes monitoring and complications/ prevention:  CAD: No  Last eye exam results: May 2025, +DRMandi Has not needed treatments. No recent vision changes.   Last dental exam: A couple months ago.  Neuropathy: None   Nephropathy: No, negative microalbumin 11/2024  HTN: Above goal today.  On statin: Yes simvastatin 40 mg  Depression: No  Feet: No ulcers or lesions per patient.    Enmanuel's PMH, PSH and allergies were reviewed today and pertinent information is summarized above.    Enmanuel's pertinent social and family history are also reviewed today and pertinent information is summarized above.    Current Outpatient Medications   Medication Sig Dispense Refill     acetone urine (KETOSTIX) test strip Check urine ketones if nausea and vomiting with significant hyperglycemia as directed. 100 strip 2      "aspirin 81 MG tablet Take 2 tablets (162 mg) by mouth daily 100 tablet 3     bisacodyl (DULCOLAX) 5 MG EC tablet Take 2 tablets at 3 pm the day before your procedure. If your procedure is before 11 am, take 2 additional tablets at 11 pm. If your procedure is after 11 am, take 2 additional tablets at 6 am. For additional instructions refer to your colonoscopy prep instructions. (Patient not taking: Reported on 5/30/2025) 4 tablet 0     blood glucose (NO BRAND SPECIFIED) test strip Use to test blood sugar 4 times daily or as directed. 600 strip 4     blood glucose (NO BRAND SPECIFIED) test strip 5 times daily 100 each 0     cetirizine (ZYRTEC) 10 MG tablet Take 1 tablet (10 mg) by mouth daily 90 tablet 0     fluticasone (FLONASE) 50 MCG/ACT nasal spray Spray 1-2 sprays into both nostrils daily 16 g 11     Glucagon (BAQSIMI) 3 MG/DOSE nasal powder Spray 1 spray (3 mg) in nostril as needed for low blood sugar. in the event of unconscious hypoglycemia or hypoglycemic seizure. May repeat dose if no response after 15 minutes. 1 each 2     hydrochlorothiazide (HYDRODIURIL) 25 MG tablet Take 1 tablet (25 mg) by mouth daily. 30 tablet 0     Insulin Aspart (NOVOLOG SC)        insulin glargine (LANTUS PEN) 100 UNIT/ML pen Inject 20 units subcutaneous in the event of insulin pump failure. 15 mL 3     Insulin Pen Needle (PEN NEEDLES) 32G X 4 MM MISC For use with Lantus pen in the event of insulin pump failure. 100 each 1     INSULIN SYRINGE-NEEDLE U-100 MISC 0.25 ML 29 X 1/2\"  insulin pump       lisinopril (ZESTRIL) 40 MG tablet Take 1 tablet (40 mg) by mouth daily. 90 tablet 3     multivitamin w/minerals (THERA-VIT-M) tablet Take 1 tablet by mouth daily 100 tablet 3     NOVOLOG VIAL 100 UNIT/ML soln USE 50 UNITS DAILY VIA INSULIN PUMP 50 mL 3     polyethylene glycol (GOLYTELY) 236 g suspension The night before the exam at 6 pm drink an 8-ounce glass every 15 minutes until the jug is half empty. If you arrive before 11 AM: Drink " "the other half of the [employer] jug at 11 PM night before procedure. If you arrive after 11 AM: Drink the other half of the Gol[employer]ely jug at 6 AM day of procedure. For additional instructions refer to your colonoscopy prep instructions. (Patient not taking: Reported on 5/30/2025) 4000 mL 0     simvastatin (ZOCOR) 40 MG tablet Take 1 tablet (40 mg) by mouth at bedtime. 90 tablet 3     tadalafil (CIALIS) 10 MG tablet Take 1-2 tablets (10-20 mg) by mouth daily as needed (prior to sexual actiivity). 20 tablet 1     No current facility-administered medications for this visit.         ROS:   Reports good physical activity tolerance.  Denies any pedal lesions or vision changes or concerns. Denies any other acute concerns except as noted above.      Exam:    /78   Pulse 85   SpO2 99%   Wt Readings from Last 10 Encounters:   05/07/25 91.4 kg (201 lb 6.4 oz)   04/29/25 92.9 kg (204 lb 12.8 oz)   04/24/25 90.7 kg (200 lb)   03/28/24 87.5 kg (193 lb)   02/06/24 87.5 kg (193 lb)   12/27/23 90.7 kg (200 lb)   05/19/22 91.6 kg (202 lb)   07/19/21 88 kg (194 lb)   06/18/20 90.7 kg (200 lb)   01/22/19 89.4 kg (197 lb)     Estimated body mass index is 28.9 kg/m  as calculated from the following:    Height as of 5/7/25: 1.778 m (5' 10\").    Weight as of 5/7/25: 91.4 kg (201 lb 6.4 oz).    Physical Exam:  General: Pleasant, well nourished and hydrated male in NAD.   Psych:  Mood is \"good,\" affect is appropriate.  Thought form and content are fluid and coherent.    HEENT: Eyes and sclera are clear. Extraocular movements are grossly intact without proptosis.    Neck: No masses or JVD are noted.    Resp: Easy and unlabored breathing.   Neuro: Alert and oriented, communicating clearly.   Ext: no swelling or edema.      Data:      Recent Labs   Lab Test 06/18/25  0829 11/26/24  0903 11/26/24  0857 02/14/24  0902 11/10/23  1648 11/10/23  0819 10/21/22  0836 10/21/22  0827   A1C 8.7*  --  8.2*  --   --  8.8*   < > 7.9*   TSH  --   --  " "1.23  --   --   --   --  1.55   LDL  --   --  96  --   --  112*   < > 90   HDL  --   --  96  --   --  91   < > 85   TRIG  --   --  86  --   --  88   < > 59   CR  --   --  0.80  --   --  0.83  --  0.75   MICROL  --  <12.0  --   --  14.6  --    < >  --    AST  --   --   --  31  --   --   --  29   ALT  --   --   --  51  --   --   --  40    < > = values in this interval not displayed.     No results found for: \"CPEPT\", \"GADAB\", \"ISCAB\"  Cholesterol   Date Value Ref Range Status   11/26/2024 209 (H) <200 mg/dL Final   11/10/2023 221 (H) <200 mg/dL Final   12/16/2020 184 <200 mg/dL Final   12/03/2019 205 (H) <200 mg/dL Final     Comment:     Desirable:       <200 mg/dl       Hemoglobin   Date Value Ref Range Status   06/18/2020 16.3 13.3 - 17.7 g/dL Final   ]      Most recent GFRs:  GFR Estimate   Date Value Ref Range Status   11/26/2024 >90 >60 mL/min/1.73m2 Final     Comment:     eGFR calculated using 2021 CKD-EPI equation.   11/10/2023 >90 >60 mL/min/1.73m2 Final   10/21/2022 >90 >60 mL/min/1.73m2 Final     Comment:     Effective December 21, 2021 eGFRcr in adults is calculated using the 2021 CKD-EPI creatinine equation which includes age and gender (Rocio et al., NEJM, DOI: 10.1056/XCZRsy9321040)   12/16/2020 >90 >60 mL/min/[1.73_m2] Final     Comment:     Non  GFR Calc  Starting 12/18/2018, serum creatinine based estimated GFR (eGFR) will be   calculated using the Chronic Kidney Disease Epidemiology Collaboration   (CKD-EPI) equation.     06/18/2020 35 (L) >60 mL/min/[1.73_m2] Final     Comment:     Non  GFR Calc  Starting 12/18/2018, serum creatinine based estimated GFR (eGFR) will be   calculated using the Chronic Kidney Disease Epidemiology Collaboration   (CKD-EPI) equation.     12/03/2019 >90 >60 mL/min/[1.73_m2] Final     Comment:     Non  GFR Calc  Starting 12/18/2018, serum creatinine based estimated GFR (eGFR) will be   calculated using the Chronic Kidney " "Disease Epidemiology Collaboration   (CKD-EPI) equation.         No results found for: \"CPEPT\", \"GADAB\", \"ISCAB\"  AST   Date Value Ref Range Status   02/14/2024 31 0 - 45 U/L Final     Comment:     Reference intervals for this test were updated on 6/12/2023 to more accurately reflect our healthy population. There may be differences in the flagging of prior results with similar values performed with this method. Interpretation of those prior results can be made in the context of the updated reference intervals.   06/18/2020 41 0 - 45 U/L Final     Lab Results   Component Value Date    ALT 51 02/14/2024    ALT 42 06/18/2020             Assessment/Plan:    Type 1 Diabetes with Diabetic Retinopathy  Blood glucose control: Not at goal of A1C <7. Highly variable home blood glucose values. Insulin pump data shows consistent boluses for meals.     Has not seen Diabetes Education to restart Guardian CGM sensor for integration with Medtronic insulin pump. I again encouraged this to improve blood glucose control, and patient is amenable to this plan.    Resent referral to Diabetes Education for assistance with Guardian CGM sensor for use with Medtronic 780G insulin pump and for insulin pump settings adjustments as needed, as well as general education and diet review. Hypoglycemia protocol reviewed. Reviewed avoiding stacking Novolog boluses. No changes made to insulin pump settings today:    Current Pump Settings:   Basal  I:CHO  ISF  12 am 0.9u/h  1:14  50  5 am 0.8u/h  1:11  43  10 am 0.8u/h  1:13  43  4:30pm 0.8u/h  1:11  38  5 pm 1.1u/h  1:11  38  7 pm 0.9u/h  1:11  38  10 pm 0.9u/h  1:12  43    DM Complications-   Retinopathy:  Yes per patient, up to date with eye exam.   Nephropathy:  BP borderline today. No microalbuminuria.  Creatinine stable.   Neuropathy: No.    Feet: OK, no ulcers or lesions.   Lipids: 40-76 yo and statin is reccomended. Patient is taking a statin.     2. Hypertension  Borderline today. Followed by " PCP Dr. Figueroa. Counseled to check home BP and bring log to PCP appointments.    3. Hyperlipidemia  On statin. LDL 96 on 11/26/2024. Followed by PCP.    4. Diabetic retinopathy  Per patient report, Ophthalmology report not available in EHR. Reminded to follow up with ophthalmology in June 2025.    Follow up in 2 months.    The longitudinal plan of care for the diagnosis(es)/condition(s) as documented were addressed during this visit. Due to the added complexity in care, I will continue to support Enmanuel in the subsequent management and with ongoing continuity of care.    40 minutes spent on the date of the encounter doing chart review, history and exam, documentation, education and counseling, as well as communication and coordination of care, and further activities as noted above.       It is my privilege to be involved in the care of the above patient.     DIANE Sampson, CNP                                      Again, thank you for allowing me to participate in the care of your patient.        Sincerely,        DEBBI Sampson CNP    Electronically signed

## 2025-06-25 NOTE — NURSING NOTE
"Chief Complaint   Patient presents with    Follow Up    Diabetes     Vital signs:      BP: (!) 170/80 Pulse: 85     SpO2: 99 %          Estimated body mass index is 28.9 kg/m  as calculated from the following:    Height as of 5/7/25: 1.778 m (5' 10\").    Weight as of 5/7/25: 91.4 kg (201 lb 6.4 oz).        "

## 2025-06-25 NOTE — PATIENT INSTRUCTIONS
Nice to see you today!    Please schedule with Diabetes Education to restart Guardian sensor and integrate with your insulin pump. Please bring Guardian sensors to that appointment. I resent Diabetes Education referral and asked them to call you to schedule.  To schedule with Diabetes Education please call [phone].      -------------------------------------------------------------    Emergency issues: Here are some concerns you should contact us about.  -Vomiting: more than twice.  Please check ketones.  If positive, go to ER. Monitor glucose hourly.   -High glucose (over 300 mg/dL twice in a row): Please check ketones.  If ketones are negative, take an insulin correction and recheck glucose in 1 hour.  If glucose is not coming down, please call the clinic. If ketones are moderate or large, drink lots of water, take an insulin correction 1.5 times your usual correction, and recheck ketones in 1 hour.  If ketones are still present (or you are vomiting), go to the ER.  -High glucose (over 300 mg/dL twice in a row) with a pump:  Twice in doubt, take it out.  Change your pump site. Check ketones.  If ketones are negative, take an insulin correction by syringe/pen and recheck glucose in 1 hour.  If glucose is not coming down, please call the clinic. If ketones are moderate or large, drink lots of water, take an insulin correction 1.5 times your usual correction by syringe/pen, and recheck ketones in 1 hour.  If ketones are still present (or you are vomiting), go to the ER.  -Hypoglycemia (low glucose):   If glucose is less than 70 mg/dL, treat with 15g carb (4 glucose tablets), recheck glucose in 15 minutes.  If low again, repeat.   If glucose is less than 54 mg/dL, treat with 30g carb, recheck glucose in 15 minutes.  If low again, repeat.  Keep glucagon in your home in case of severe hypoglycemia and train someone how to use this.    Emergency kit (please ensure you always have these with you):   Glucose  tablets  Glucagon  Insulin  Syringes/needles   Extra infusion set (if on a pump)  Ketone strips    Backup insulin plan (in case of pump failure):   If your insulin pump fails, your body will not have any insulin available and your blood glucose levels can get dangerously high. This can result in diabetic ketoacidosis making you very sick (abdominal pain, confusion, vomiting, dehydration, positive urine ketones).    You have an active long acting basal insulin (Degludec: Tresiba / Detemir: Levemir / Glargine: Lantus / Toujeo / Semglee / Basaglar) prescription available. Please pick this up from your pharmacy in case your pump fails.  Basal insulin dose:20 units once per day.    Immediately after your pump fails and until you can  the long acting insulin, use short acting insulin (Aspart: Novolog/Fiasp / Lispro: Humalog / Glulisine:Apidra) injections (pen or vial and syringe) per your correction scale every 4 hours and continue to cover carbohydrates. You can stop this 4 hourly correction after you give yourself the basal insulin dose.    You should also administer short acting insulin subcutaneously to cover carbohydrates and per your correction scale prior to meals.     Contact us for help transitioning back to your pump when this is available.     Contact information:   If you have concerns, please send me a [facility] [place] message or call the clinic at [phone].  For more urgent concerns, please call [phone] after hours/weekends and ask to speak with the endocrinologist on call.      Please let me know if you are having low blood sugars less than 70 or over 350 mg/dL.  Do not wait until your next appointment if this is happening.

## 2025-06-26 ENCOUNTER — PATIENT OUTREACH (OUTPATIENT)
Dept: CARE COORDINATION | Facility: CLINIC | Age: 65
End: 2025-06-26
Payer: COMMERCIAL

## 2025-06-30 ENCOUNTER — PATIENT OUTREACH (OUTPATIENT)
Dept: CARE COORDINATION | Facility: CLINIC | Age: 65
End: 2025-06-30
Payer: COMMERCIAL

## 2025-07-16 DIAGNOSIS — I10 HYPERTENSION GOAL BP (BLOOD PRESSURE) < 140/90: ICD-10-CM

## 2025-07-16 RX ORDER — HYDROCHLOROTHIAZIDE 25 MG/1
25 TABLET ORAL DAILY
Qty: 90 TABLET | Refills: 2 | Status: SHIPPED | OUTPATIENT
Start: 2025-07-16

## 2025-08-12 ENCOUNTER — OFFICE VISIT (OUTPATIENT)
Dept: EDUCATION SERVICES | Facility: CLINIC | Age: 65
End: 2025-08-12
Attending: NURSE PRACTITIONER
Payer: COMMERCIAL

## 2025-08-12 DIAGNOSIS — E10.9 TYPE 1 DIABETES, HBA1C GOAL < 7% (H): ICD-10-CM

## 2025-08-12 PROCEDURE — G0108 DIAB MANAGE TRN  PER INDIV: HCPCS | Mod: AE | Performed by: PHARMACIST

## 2025-08-14 RX ORDER — BLOOD-GLUCOSE SENSOR
1 EACH MISCELLANEOUS
Qty: 5 EACH | Refills: 2 | Status: SHIPPED | OUTPATIENT
Start: 2025-08-14

## 2025-08-19 ENCOUNTER — MEDICAL CORRESPONDENCE (OUTPATIENT)
Dept: HEALTH INFORMATION MANAGEMENT | Facility: CLINIC | Age: 65
End: 2025-08-19
Payer: COMMERCIAL

## 2025-08-21 ENCOUNTER — TELEPHONE (OUTPATIENT)
Dept: EDUCATION SERVICES | Facility: CLINIC | Age: 65
End: 2025-08-21
Payer: COMMERCIAL

## 2025-08-27 ENCOUNTER — E-VISIT (OUTPATIENT)
Dept: FAMILY MEDICINE | Facility: CLINIC | Age: 65
End: 2025-08-27
Payer: COMMERCIAL

## 2025-08-27 DIAGNOSIS — J01.01 ACUTE RECURRENT MAXILLARY SINUSITIS: Primary | ICD-10-CM

## 2025-08-27 PROCEDURE — 99207 PR NON-BILLABLE SERV PER CHARTING: CPT | Performed by: STUDENT IN AN ORGANIZED HEALTH CARE EDUCATION/TRAINING PROGRAM

## 2025-08-29 ENCOUNTER — HOSPITAL ENCOUNTER (OUTPATIENT)
Dept: CT IMAGING | Facility: CLINIC | Age: 65
Discharge: HOME | End: 2025-08-29
Payer: COMMERCIAL

## 2025-08-29 DIAGNOSIS — J32.9 CHRONIC SINUSITIS, UNSPECIFIED LOCATION: ICD-10-CM

## 2025-08-29 PROCEDURE — 70486 CT MAXILLOFACIAL W/O DYE: CPT

## 2025-09-03 ENCOUNTER — TELEPHONE (OUTPATIENT)
Dept: RHEUMATOLOGY | Facility: CLINIC | Age: 65
End: 2025-09-03
Payer: COMMERCIAL

## (undated) DEVICE — TUBING SUCTION 12"X1/4" N612

## (undated) DEVICE — ENDO SNARE EXACTO COLD 9MM LOOP 2.4MMX230CM 00711115

## (undated) DEVICE — LUBRICATING JELLY 4.25OZ

## (undated) DEVICE — SOL WATER IRRIG 1000ML BOTTLE 07139-09

## (undated) DEVICE — ENDO TRAP POLYP E-TRAP 00711099

## (undated) DEVICE — KIT ENDO TURNOVER/PROCEDURE CARRY-ON 101822

## (undated) DEVICE — GLOVE EXAM NITRILE LG

## (undated) RX ORDER — FENTANYL CITRATE 50 UG/ML
INJECTION, SOLUTION INTRAMUSCULAR; INTRAVENOUS
Status: DISPENSED
Start: 2018-11-19

## (undated) RX ORDER — LIDOCAINE HYDROCHLORIDE 10 MG/ML
INJECTION, SOLUTION EPIDURAL; INFILTRATION; INTRACAUDAL; PERINEURAL
Status: DISPENSED
Start: 2018-11-19